# Patient Record
Sex: FEMALE | Race: WHITE | NOT HISPANIC OR LATINO | Employment: FULL TIME | URBAN - NONMETROPOLITAN AREA
[De-identification: names, ages, dates, MRNs, and addresses within clinical notes are randomized per-mention and may not be internally consistent; named-entity substitution may affect disease eponyms.]

---

## 2021-07-15 ENCOUNTER — APPOINTMENT (EMERGENCY)
Dept: ULTRASOUND IMAGING | Facility: HOSPITAL | Age: 56
End: 2021-07-15
Payer: COMMERCIAL

## 2021-07-15 ENCOUNTER — HOSPITAL ENCOUNTER (EMERGENCY)
Facility: HOSPITAL | Age: 56
Discharge: HOME | End: 2021-07-15
Attending: EMERGENCY MEDICINE | Admitting: EMERGENCY MEDICINE
Payer: COMMERCIAL

## 2021-07-15 VITALS
RESPIRATION RATE: 20 BRPM | HEART RATE: 101 BPM | OXYGEN SATURATION: 98 % | WEIGHT: 211.86 LBS | TEMPERATURE: 97.9 F | DIASTOLIC BLOOD PRESSURE: 83 MMHG | SYSTOLIC BLOOD PRESSURE: 149 MMHG

## 2021-07-15 DIAGNOSIS — R73.9 HYPERGLYCEMIA: ICD-10-CM

## 2021-07-15 DIAGNOSIS — R10.9 ABDOMINAL PAIN: Primary | ICD-10-CM

## 2021-07-15 LAB
ALBUMIN SERPL BCP-MCNC: 4.7 G/DL (ref 3.5–5)
ALP SERPL-CCNC: 62 U/L (ref 46–116)
ALT SERPL W P-5'-P-CCNC: 40 U/L (ref 12–78)
ANION GAP SERPL CALCULATED.3IONS-SCNC: 11 MMOL/L (ref 4–13)
AST SERPL W P-5'-P-CCNC: 19 U/L (ref 5–45)
ATRIAL RATE: 100 BPM
BASOPHILS # BLD AUTO: 0.06 THOUSANDS/ΜL (ref 0–0.1)
BASOPHILS NFR BLD AUTO: 1 % (ref 0–1)
BILIRUB SERPL-MCNC: 0.62 MG/DL (ref 0.2–1)
BILIRUB UR QL STRIP: NEGATIVE
BUN SERPL-MCNC: 18 MG/DL (ref 5–25)
CALCIUM SERPL-MCNC: 9.9 MG/DL (ref 8.3–10.1)
CHLORIDE SERPL-SCNC: 102 MMOL/L (ref 100–108)
CLARITY UR: CLEAR
CO2 SERPL-SCNC: 24 MMOL/L (ref 21–32)
COLOR UR: YELLOW
CREAT SERPL-MCNC: 0.93 MG/DL (ref 0.6–1.3)
EOSINOPHIL # BLD AUTO: 0.04 THOUSAND/ΜL (ref 0–0.61)
EOSINOPHIL NFR BLD AUTO: 0 % (ref 0–6)
ERYTHROCYTE [DISTWIDTH] IN BLOOD BY AUTOMATED COUNT: 12.2 % (ref 11.6–15.1)
GFR SERPL CREATININE-BSD FRML MDRD: 69 ML/MIN/1.73SQ M
GLUCOSE SERPL-MCNC: 242 MG/DL (ref 65–140)
GLUCOSE UR STRIP-MCNC: ABNORMAL MG/DL
HCT VFR BLD AUTO: 47 % (ref 34.8–46.1)
HGB BLD-MCNC: 15.6 G/DL (ref 11.5–15.4)
HGB UR QL STRIP.AUTO: NEGATIVE
IMM GRANULOCYTES # BLD AUTO: 0.04 THOUSAND/UL (ref 0–0.2)
IMM GRANULOCYTES NFR BLD AUTO: 0 % (ref 0–2)
KETONES UR STRIP-MCNC: ABNORMAL MG/DL
LEUKOCYTE ESTERASE UR QL STRIP: NEGATIVE
LIPASE SERPL-CCNC: 158 U/L (ref 73–393)
LYMPHOCYTES # BLD AUTO: 2.6 THOUSANDS/ΜL (ref 0.6–4.47)
LYMPHOCYTES NFR BLD AUTO: 25 % (ref 14–44)
MCH RBC QN AUTO: 31.9 PG (ref 26.8–34.3)
MCHC RBC AUTO-ENTMCNC: 33.2 G/DL (ref 31.4–37.4)
MCV RBC AUTO: 96 FL (ref 82–98)
MONOCYTES # BLD AUTO: 0.97 THOUSAND/ΜL (ref 0.17–1.22)
MONOCYTES NFR BLD AUTO: 9 % (ref 4–12)
NEUTROPHILS # BLD AUTO: 6.92 THOUSANDS/ΜL (ref 1.85–7.62)
NEUTS SEG NFR BLD AUTO: 65 % (ref 43–75)
NITRITE UR QL STRIP: NEGATIVE
NRBC BLD AUTO-RTO: 0 /100 WBCS
P AXIS: 35 DEGREES
PH UR STRIP.AUTO: 5.5 [PH]
PLATELET # BLD AUTO: 350 THOUSANDS/UL (ref 149–390)
PMV BLD AUTO: 10.9 FL (ref 8.9–12.7)
POTASSIUM SERPL-SCNC: 3.8 MMOL/L (ref 3.5–5.3)
PR INTERVAL: 154 MS
PROT SERPL-MCNC: 8 G/DL (ref 6.4–8.2)
PROT UR STRIP-MCNC: NEGATIVE MG/DL
QRS AXIS: -7 DEGREES
QRSD INTERVAL: 82 MS
QT INTERVAL: 352 MS
QTC INTERVAL: 454 MS
RBC # BLD AUTO: 4.89 MILLION/UL (ref 3.81–5.12)
SODIUM SERPL-SCNC: 137 MMOL/L (ref 136–145)
SP GR UR STRIP.AUTO: 1.02 (ref 1–1.03)
T WAVE AXIS: 48 DEGREES
UROBILINOGEN UR QL STRIP.AUTO: 0.2 E.U./DL
VENTRICULAR RATE: 100 BPM
WBC # BLD AUTO: 10.63 THOUSAND/UL (ref 4.31–10.16)

## 2021-07-15 PROCEDURE — 76705 ECHO EXAM OF ABDOMEN: CPT

## 2021-07-15 PROCEDURE — 85025 COMPLETE CBC W/AUTO DIFF WBC: CPT | Performed by: EMERGENCY MEDICINE

## 2021-07-15 PROCEDURE — 99285 EMERGENCY DEPT VISIT HI MDM: CPT | Performed by: EMERGENCY MEDICINE

## 2021-07-15 PROCEDURE — 83690 ASSAY OF LIPASE: CPT | Performed by: EMERGENCY MEDICINE

## 2021-07-15 PROCEDURE — 81003 URINALYSIS AUTO W/O SCOPE: CPT | Performed by: EMERGENCY MEDICINE

## 2021-07-15 PROCEDURE — 36415 COLL VENOUS BLD VENIPUNCTURE: CPT

## 2021-07-15 PROCEDURE — 93010 ELECTROCARDIOGRAM REPORT: CPT | Performed by: INTERNAL MEDICINE

## 2021-07-15 PROCEDURE — 80053 COMPREHEN METABOLIC PANEL: CPT | Performed by: EMERGENCY MEDICINE

## 2021-07-15 PROCEDURE — 99285 EMERGENCY DEPT VISIT HI MDM: CPT

## 2021-07-15 PROCEDURE — 93005 ELECTROCARDIOGRAM TRACING: CPT

## 2021-07-15 PROCEDURE — 96360 HYDRATION IV INFUSION INIT: CPT

## 2021-07-15 RX ORDER — ATORVASTATIN CALCIUM 10 MG/1
10 TABLET, FILM COATED ORAL DAILY
COMMUNITY
End: 2022-03-18 | Stop reason: SDUPTHER

## 2021-07-15 RX ORDER — ASPIRIN 81 MG/1
81 TABLET ORAL DAILY
COMMUNITY
End: 2022-03-18 | Stop reason: SDUPTHER

## 2021-07-15 RX ORDER — DICYCLOMINE HCL 20 MG
20 TABLET ORAL 2 TIMES DAILY
Qty: 20 TABLET | Refills: 0 | Status: SHIPPED | OUTPATIENT
Start: 2021-07-15

## 2021-07-15 RX ORDER — AMLODIPINE BESYLATE 5 MG/1
5 TABLET ORAL DAILY
COMMUNITY
End: 2022-03-18 | Stop reason: SDUPTHER

## 2021-07-15 RX ORDER — UREA 10 %
400 LOTION (ML) TOPICAL DAILY
COMMUNITY
End: 2022-03-18 | Stop reason: SDUPTHER

## 2021-07-15 RX ORDER — LISINOPRIL 40 MG/1
40 TABLET ORAL DAILY
COMMUNITY
End: 2022-03-18 | Stop reason: SDUPTHER

## 2021-07-15 RX ADMIN — SODIUM CHLORIDE 1000 ML: 0.9 INJECTION, SOLUTION INTRAVENOUS at 09:06

## 2021-07-15 NOTE — ED PROVIDER NOTES
History  Chief Complaint   Patient presents with    Abdominal Pain     right sided abdominal pain for 10 days  states "has a fatty liver"     Patient is a pleasant 58-year-old female complaining of generalized abdominal pain mostly on the right side her abdomen is in for the right upper quadrant to the right lower quadrant  Mild nausea but no vomiting  Occasional episodes of diarrhea  Occasional radiation to the back  Patient states he has recently changed her antihyperglycemic medications, but otherwise no change in diet  Patient otherwise very active and energetic  Denies any fevers chills  Denies any chest pain shortness of breath, headaches, rash, edema calf pain tenderness or asymmetry  No recent travel  No other sick contacts  Patient is not immunocompromised immunosuppressed  Patient not anticoagulated  Patient has been vaccinated for COVID x2  Prior to Admission Medications   Prescriptions Last Dose Informant Patient Reported? Taking? amLODIPine (NORVASC) 5 mg tablet   Yes Yes   Sig: Take 5 mg by mouth daily   aspirin (ECOTRIN LOW STRENGTH) 81 mg EC tablet   Yes No   Sig: Take 81 mg by mouth daily   atorvastatin (LIPITOR) 10 mg tablet   Yes Yes   Sig: Take 10 mg by mouth daily   folic acid (FOLVITE) 933 MCG tablet   Yes No   Sig: Take 400 mcg by mouth daily   lisinopril (ZESTRIL) 40 mg tablet   Yes Yes   Sig: Take 40 mg by mouth daily   metFORMIN (GLUCOPHAGE) 1000 MG tablet   Yes Yes   Sig: Take 1,000 mg by mouth 2 (two) times a day with meals   sitaGLIPtin (JANUVIA) 100 mg tablet   Yes No   Sig: Take 100 mg by mouth daily      Facility-Administered Medications: None       Past Medical History:   Diagnosis Date    Diabetes mellitus ([address] )     High cholesterol     Hypertension        Past Surgical History:   Procedure Laterality Date    HYSTERECTOMY         History reviewed  No pertinent family history  I have reviewed and agree with the history as documented      E-Cigarette/Vaping  E-Cigarette Use Never User      E-Cigarette/Vaping Substances     Social History     Tobacco Use    Smoking status: Former Smoker    Smokeless tobacco: Never Used   Vaping Use    Vaping Use: Never used   Substance Use Topics    Alcohol use: Never    Drug use: Never       Review of Systems   Constitutional: Negative for appetite change, chills, fatigue, fever and unexpected weight change  HENT: Negative for congestion, ear pain, rhinorrhea and sore throat  Eyes: Negative for pain and visual disturbance  Respiratory: Negative for cough, chest tightness, shortness of breath and wheezing  Cardiovascular: Negative for chest pain, palpitations and leg swelling  Gastrointestinal: Positive for abdominal distention, abdominal pain, diarrhea and nausea  Negative for constipation and vomiting  Genitourinary: Negative for difficulty urinating, dysuria, frequency, hematuria, menstrual problem, pelvic pain, vaginal bleeding and vaginal discharge  Musculoskeletal: Negative for arthralgias, back pain and neck pain  Skin: Negative for color change and rash  Neurological: Negative for dizziness, seizures, syncope, light-headedness and headaches  Psychiatric/Behavioral: Negative for confusion and sleep disturbance  All other systems reviewed and are negative  Physical Exam  Physical Exam  Vitals and nursing note reviewed  Constitutional:       General: She is not in acute distress  Appearance: She is well-developed and normal weight  She is not ill-appearing, toxic-appearing or diaphoretic  HENT:      Head: Normocephalic and atraumatic  Mouth/Throat:      Mouth: Mucous membranes are moist    Eyes:      General: No scleral icterus  Extraocular Movements: Extraocular movements intact  Conjunctiva/sclera: Conjunctivae normal    Cardiovascular:      Rate and Rhythm: Normal rate and regular rhythm  Heart sounds: Normal heart sounds  No murmur heard  No friction rub   No gallop  Pulmonary:      Effort: Pulmonary effort is normal  No respiratory distress  Breath sounds: Normal breath sounds  No wheezing or rales  Abdominal:      General: Bowel sounds are normal  There is no distension  There are no signs of injury  Palpations: Abdomen is soft  There is no hepatomegaly, splenomegaly or mass  Tenderness: There is abdominal tenderness in the right upper quadrant and epigastric area  There is no guarding or rebound  Negative signs include Paredes's sign and McBurney's sign  Hernia: No hernia is present  Musculoskeletal:         General: Normal range of motion  Cervical back: Normal range of motion and neck supple  Skin:     General: Skin is warm and dry  Capillary Refill: Capillary refill takes less than 2 seconds  Coloration: Skin is not cyanotic, mottled or pale  Findings: No rash  Neurological:      General: No focal deficit present  Mental Status: She is alert and oriented to person, place, and time     Psychiatric:         Mood and Affect: Mood normal          Behavior: Behavior normal          Vital Signs  ED Triage Vitals   Temperature Pulse Respirations Blood Pressure SpO2   07/15/21 0821 07/15/21 0821 07/15/21 0821 07/15/21 0821 07/15/21 0821   97 9 °F (36 6 °C) (!) 120 18 (!) 186/119 98 %      Temp Source Heart Rate Source Patient Position - Orthostatic VS BP Location FiO2 (%)   07/15/21 0821 07/15/21 0830 07/15/21 0821 07/15/21 0821 --   Temporal Monitor Sitting Left arm       Pain Score       07/15/21 0821       5           Vitals:    07/15/21 0900 07/15/21 0930 07/15/21 1000 07/15/21 1010   BP: 147/82 150/77 153/74 149/83   Pulse: 103 99 101 101   Patient Position - Orthostatic VS: Sitting Sitting Sitting          Visual Acuity      ED Medications  Medications   sodium chloride 0 9 % bolus 1,000 mL (0 mL Intravenous Stopped 7/15/21 1006)       Diagnostic Studies  Results Reviewed     Procedure Component Value Units Date/Time    UA w Reflex to Microscopic w Reflex to Culture [[id number]]  (Abnormal) Collected: 07/15/21 0905    Lab Status: Final result Specimen: Urine, Clean Catch Updated: 07/15/21 0912     Color, UA Yellow     Clarity, UA Clear     Specific Gravity, UA 1 025     pH, UA 5 5     Leukocytes, UA Negative     Nitrite, UA Negative     Protein, UA Negative mg/dl      Glucose,  (1/4%) mg/dl      Ketones, UA 15 (1+) mg/dl      Urobilinogen, UA 0 2 E U /dl      Bilirubin, UA Negative     Blood, UA Negative    Comprehensive metabolic panel [[id number]]  (Abnormal) Collected: 07/15/21 0836    Lab Status: Final result Specimen: Blood from Arm, Right Updated: 07/15/21 0902     Sodium 137 mmol/L      Potassium 3 8 mmol/L      Chloride 102 mmol/L      CO2 24 mmol/L      ANION GAP 11 mmol/L      BUN 18 mg/dL      Creatinine 0 93 mg/dL      Glucose 242 mg/dL      Calcium 9 9 mg/dL      AST 19 U/L      ALT 40 U/L      Alkaline Phosphatase 62 U/L      Total Protein 8 0 g/dL      Albumin 4 7 g/dL      Total Bilirubin 0 62 mg/dL      eGFR 69 ml/min/1 73sq m     Narrative:      [place] guidelines for Chronic Kidney Disease (CKD):     Stage 1 with normal or high GFR (GFR > 90 mL/min/1 73 square meters)    Stage 2 Mild CKD (GFR = 60-89 mL/min/1 73 square meters)    Stage 3A Moderate CKD (GFR = 45-59 mL/min/1 73 square meters)    Stage 3B Moderate CKD (GFR = 30-44 mL/min/1 73 square meters)    Stage 4 Severe CKD (GFR = 15-29 mL/min/1 73 square meters)    Stage 5 End Stage CKD (GFR <15 mL/min/1 73 square meters)  Note: GFR calculation is accurate only with a steady state creatinine    Lipase [[id number]]  (Normal) Collected: 07/15/21 0836    Lab Status: Final result Specimen: Blood from Arm, Right Updated: 07/15/21 0856     Lipase 158 u/L     CBC and differential [[id number]]  (Abnormal) Collected: 07/15/21 0836    Lab Status: Final result Specimen: Blood from Arm, Right Updated: 07/15/21 0843     WBC 10 63 Thousand/uL      RBC 4 89 Million/uL      Hemoglobin 15 6 g/dL      Hematocrit 47 0 %      MCV 96 fL      MCH 31 9 pg      MCHC 33 2 g/dL      RDW 12 2 %      MPV 10 9 fL      Platelets 021 Thousands/uL      nRBC 0 /100 WBCs      Neutrophils Relative 65 %      Immat GRANS % 0 %      Lymphocytes Relative 25 %      Monocytes Relative 9 %      Eosinophils Relative 0 %      Basophils Relative 1 %      Neutrophils Absolute 6 92 Thousands/µL      Immature Grans Absolute 0 04 Thousand/uL      Lymphocytes Absolute 2 60 Thousands/µL      Monocytes Absolute 0 97 Thousand/µL      Eosinophils Absolute 0 04 Thousand/µL      Basophils Absolute 0 06 Thousands/µL                  US gallbladder   Final Result by Renato Rhodes MD (07/15 1037)      Fatty infiltration of the liver  No evidence for cholelithiasis  Workstation performed: [id number]                    Procedures  ECG 12 Lead Documentation Only    Date/Time: 7/15/2021 11:11 AM  Performed by: Isabella Posey DO  Authorized by: Isabella Posey DO     Indications / Diagnosis:  Abd pain  ECG reviewed by me, the ED Provider: yes    Patient location:  ED  Rate:     ECG rate:  98    ECG rate assessment: normal    Rhythm:     Rhythm: sinus rhythm    Ectopy:     Ectopy: PVCs    QRS:     QRS axis:  Normal    QRS intervals:  Normal  Conduction:     Conduction: normal    ST segments:     ST segments:  Normal  T waves:     T waves: normal               ED Course                             SBIRT 20yo+      Most Recent Value   SBIRT (22 yo +)   In order to provide better care to our patients, we are screening all of our patients for alcohol and drug use  Would it be okay to ask you these screening questions? Yes Filed at: 07/15/2021 0846   Initial Alcohol Screen: US AUDIT-C    1  How often do you have a drink containing alcohol?  0 Filed at: 07/15/2021 0846   2  How many drinks containing alcohol do you have on a typical day you are drinking?    0 Filed at: 07/15/2021 0846   3b  FEMALE Any Age, or MALE 65+: How often do you have 4 or more drinks on one occassion? 0 Filed at: 07/15/2021 0846   Audit-C Score  0 Filed at: 07/15/2021 8726   HUMAIRA: How many times in the past year have you    Used an illegal drug or used a prescription medication for non-medical reasons? Never Filed at: 07/15/2021 0846                    MDM    Disposition  Final diagnoses:   Abdominal pain   Hyperglycemia     Time reflects when diagnosis was documented in both MDM as applicable and the Disposition within this note     Time User Action Codes Description Comment    7/15/2021 11:05 AM Cameron MARTELL Add [R10 9] Abdominal pain     7/15/2021 11:05 AM Cameron MARTELL Add [R73 9] Hyperglycemia       ED Disposition     ED Disposition Condition Date/Time Comment    Discharge Stable Thu Jul 15, 2021 11:05 AM Sammie Torres discharge to home/self care              Follow-up Information     Follow up With Specialties Details Why Contact Info    Derek Hong MD Family Medicine Schedule an appointment as soon as possible for a visit   [address] [place] [address] Court  [phone]            Discharge Medication List as of 7/15/2021 11:06 AM      START taking these medications    Details   dicyclomine (BENTYL) 20 mg tablet Take 1 tablet (20 mg total) by mouth 2 (two) times a day, Starting Thu 7/15/2021, Normal         CONTINUE these medications which have NOT CHANGED    Details   amLODIPine (NORVASC) 5 mg tablet Take 5 mg by mouth daily, Historical Med      atorvastatin (LIPITOR) 10 mg tablet Take 10 mg by mouth daily, Historical Med      lisinopril (ZESTRIL) 40 mg tablet Take 40 mg by mouth daily, Historical Med      metFORMIN (GLUCOPHAGE) 1000 MG tablet Take 1,000 mg by mouth 2 (two) times a day with meals, Historical Med      aspirin (ECOTRIN LOW STRENGTH) 81 mg EC tablet Take 81 mg by mouth daily, Historical Med      folic acid (FOLVITE) 121 MCG tablet Take 400 mcg by mouth daily, Historical Med      sitaGLIPtin (JANUVIA) 100 mg tablet Take 100 mg by mouth daily, Historical Med           No discharge procedures on file      PDMP Review     None          ED Provider  Electronically Signed by           Gal Monge, DO  07/15/21 George [id number], DO  07/15/21 2524

## 2022-02-10 ENCOUNTER — APPOINTMENT (EMERGENCY)
Dept: CT IMAGING | Facility: HOSPITAL | Age: 57
End: 2022-02-10
Payer: COMMERCIAL

## 2022-02-10 ENCOUNTER — HOSPITAL ENCOUNTER (EMERGENCY)
Facility: HOSPITAL | Age: 57
Discharge: HOME | End: 2022-02-10
Attending: EMERGENCY MEDICINE
Payer: COMMERCIAL

## 2022-02-10 VITALS
SYSTOLIC BLOOD PRESSURE: 136 MMHG | HEIGHT: 71 IN | OXYGEN SATURATION: 98 % | WEIGHT: 211 LBS | BODY MASS INDEX: 29.54 KG/M2 | TEMPERATURE: 99.4 F | RESPIRATION RATE: 13 BRPM | DIASTOLIC BLOOD PRESSURE: 81 MMHG | HEART RATE: 114 BPM

## 2022-02-10 DIAGNOSIS — R10.84 GENERALIZED ABDOMINAL PAIN: Primary | ICD-10-CM

## 2022-02-10 DIAGNOSIS — K21.9 GERD (GASTROESOPHAGEAL REFLUX DISEASE): ICD-10-CM

## 2022-02-10 LAB
ALBUMIN SERPL BCP-MCNC: 4.6 G/DL (ref 3.5–5)
ALP SERPL-CCNC: 74 U/L (ref 46–116)
ALT SERPL W P-5'-P-CCNC: 38 U/L (ref 12–78)
ANION GAP SERPL CALCULATED.3IONS-SCNC: 14 MMOL/L (ref 4–13)
AST SERPL W P-5'-P-CCNC: 19 U/L (ref 5–45)
BASOPHILS # BLD AUTO: 0.05 THOUSANDS/ΜL (ref 0–0.1)
BASOPHILS NFR BLD AUTO: 0 % (ref 0–1)
BILIRUB SERPL-MCNC: 0.45 MG/DL (ref 0.2–1)
BUN SERPL-MCNC: 16 MG/DL (ref 5–25)
CALCIUM SERPL-MCNC: 9.2 MG/DL (ref 8.3–10.1)
CHLORIDE SERPL-SCNC: 105 MMOL/L (ref 100–108)
CO2 SERPL-SCNC: 23 MMOL/L (ref 21–32)
CREAT SERPL-MCNC: 0.86 MG/DL (ref 0.6–1.3)
EOSINOPHIL # BLD AUTO: 0.02 THOUSAND/ΜL (ref 0–0.61)
EOSINOPHIL NFR BLD AUTO: 0 % (ref 0–6)
ERYTHROCYTE [DISTWIDTH] IN BLOOD BY AUTOMATED COUNT: 12.7 % (ref 11.6–15.1)
GFR SERPL CREATININE-BSD FRML MDRD: 75 ML/MIN/1.73SQ M
GLUCOSE SERPL-MCNC: 220 MG/DL (ref 65–140)
HCT VFR BLD AUTO: 48.8 % (ref 34.8–46.1)
HGB BLD-MCNC: 16 G/DL (ref 11.5–15.4)
IMM GRANULOCYTES # BLD AUTO: 0.02 THOUSAND/UL (ref 0–0.2)
IMM GRANULOCYTES NFR BLD AUTO: 0 % (ref 0–2)
LYMPHOCYTES # BLD AUTO: 2.77 THOUSANDS/ΜL (ref 0.6–4.47)
LYMPHOCYTES NFR BLD AUTO: 23 % (ref 14–44)
MCH RBC QN AUTO: 31.6 PG (ref 26.8–34.3)
MCHC RBC AUTO-ENTMCNC: 32.8 G/DL (ref 31.4–37.4)
MCV RBC AUTO: 96 FL (ref 82–98)
MONOCYTES # BLD AUTO: 1.03 THOUSAND/ΜL (ref 0.17–1.22)
MONOCYTES NFR BLD AUTO: 9 % (ref 4–12)
NEUTROPHILS # BLD AUTO: 8.07 THOUSANDS/ΜL (ref 1.85–7.62)
NEUTS SEG NFR BLD AUTO: 68 % (ref 43–75)
NRBC BLD AUTO-RTO: 0 /100 WBCS
PLATELET # BLD AUTO: 372 THOUSANDS/UL (ref 149–390)
PMV BLD AUTO: 10.5 FL (ref 8.9–12.7)
POTASSIUM SERPL-SCNC: 3.7 MMOL/L (ref 3.5–5.3)
PROT SERPL-MCNC: 7.9 G/DL (ref 6.4–8.2)
RBC # BLD AUTO: 5.07 MILLION/UL (ref 3.81–5.12)
SODIUM SERPL-SCNC: 142 MMOL/L (ref 136–145)
WBC # BLD AUTO: 11.96 THOUSAND/UL (ref 4.31–10.16)

## 2022-02-10 PROCEDURE — 93005 ELECTROCARDIOGRAM TRACING: CPT

## 2022-02-10 PROCEDURE — 99284 EMERGENCY DEPT VISIT MOD MDM: CPT

## 2022-02-10 PROCEDURE — 85025 COMPLETE CBC W/AUTO DIFF WBC: CPT | Performed by: STUDENT IN AN ORGANIZED HEALTH CARE EDUCATION/TRAINING PROGRAM

## 2022-02-10 PROCEDURE — G1004 CDSM NDSC: HCPCS

## 2022-02-10 PROCEDURE — 80053 COMPREHEN METABOLIC PANEL: CPT | Performed by: STUDENT IN AN ORGANIZED HEALTH CARE EDUCATION/TRAINING PROGRAM

## 2022-02-10 PROCEDURE — 99285 EMERGENCY DEPT VISIT HI MDM: CPT | Performed by: EMERGENCY MEDICINE

## 2022-02-10 PROCEDURE — 36415 COLL VENOUS BLD VENIPUNCTURE: CPT | Performed by: STUDENT IN AN ORGANIZED HEALTH CARE EDUCATION/TRAINING PROGRAM

## 2022-02-10 PROCEDURE — 74177 CT ABD & PELVIS W/CONTRAST: CPT

## 2022-02-10 RX ADMIN — IOHEXOL 100 ML: 350 INJECTION, SOLUTION INTRAVENOUS at 12:33

## 2022-02-10 NOTE — ED ATTENDING ATTESTATION
2/10/2022  I saw and evaluated the patient  I have discussed the patient with the resident physician and agree with the resident's findings, assessment and plan as documented in the resident physician's note, unless otherwise documented below  All available laboratory and imaging studies were reviewed by myself  I was present for key portions of any procedure(s) performed by the resident and I was immediately available to provide assistance  I agree with the current assessment done in the Emergency Department  I have conducted an independent evaluation of this patient  Chief Complaint   Patient presents with    Abdominal Pain     patient reports two weeks of intermittent right sided abdominal pain and diarrhea that began this morning  Josephine La is a 61 y o  female with past medical history of hypertension, hyperlipidemia, and diabetes mellitus presenting with abdominal pain and diarrhea  Patient reports that over the past several years, she has had right-sided abdominal pains that come and go, associated with excessive burping and gas  There does not appear to be any obvious provocation except perhaps with certain meals  There is occasional diarrhea  This morning, patient reports drinking just a cup of coffee  She has then been busy cleaning  She then developed a right-sided cramping abdominal pain and proceeded to have a bowel movement that was primarily loose/diarrheal in nature  There was no blood in stool, there was no dark tarry stool  She also had acid reflux and increased gas  No recent fevers or chills  No chest pain  No shortness of breath  No nausea or vomiting today  Patient is wondering whether she has an infection or perhaps gastritis  She also reports that her symptoms started sometime after her primary care physician started her on Jardiance    She is presenting today because the pain made her anxious and she wants to make sure that she does not have an infection  REVIEW OF SYSTEMS    Constitutional: denies fevers, chills  Visual/Eyes: no changes in vision  HENT: no rhinorrhea, no sore throat  Cardiac: no chest pain  Respiratory: no shortness of breath, no cough  GI:  As above  : no burning with urination  Heme/Onc: no easy bruising  Endocrine:  History of diabetes  Neuro: no focal weakness or numbness, no headaches    Ten systems reviewed and negative unless otherwise noted in HPI and above    PAST MEDICAL HISTORY  Past Medical History:   Diagnosis Date    Diabetes mellitus ([facility] [address] )     High cholesterol     Hypertension        SURGICAL HISTORY  Past Surgical History:   Procedure Laterality Date    HYSTERECTOMY         FAMILY HISTORY  History reviewed  No pertinent family history  CURRENT MEDICATIONS  No current facility-administered medications on file prior to encounter       Current Outpatient Medications on File Prior to Encounter   Medication Sig    amLODIPine (NORVASC) 5 mg tablet Take 5 mg by mouth daily    aspirin (ECOTRIN LOW STRENGTH) 81 mg EC tablet Take 81 mg by mouth daily    atorvastatin (LIPITOR) 10 mg tablet Take 10 mg by mouth daily    dicyclomine (BENTYL) 20 mg tablet Take 1 tablet (20 mg total) by mouth 2 (two) times a day    folic acid (FOLVITE) 698 MCG tablet Take 400 mcg by mouth daily    lisinopril (ZESTRIL) 40 mg tablet Take 40 mg by mouth daily    metFORMIN (GLUCOPHAGE) 1000 MG tablet Take 1,000 mg by mouth 2 (two) times a day with meals    sitaGLIPtin (JANUVIA) 100 mg tablet Take 100 mg by mouth daily       ALLERGIES  No Known Allergies    SOCIAL HISTORY  Social History     Socioeconomic History    Marital status: /Civil Union     Spouse name: None    Number of children: None    Years of education: None    Highest education level: None   Occupational History    None   Tobacco Use    Smoking status: Former Smoker    Smokeless tobacco: Never Used   Vaping Use    Vaping Use: Never used   Substance and Sexual Activity    Alcohol use: Never    Drug use: Never    Sexual activity: None   Other Topics Concern    None   Social History Narrative    None     Social Determinants of Health     Financial Resource Strain: Not on file   Food Insecurity: Not on file   Transportation Needs: Not on file   Physical Activity: Not on file   Stress: Not on file   Social Connections: Not on file   Intimate Partner Violence: Not on file   Housing Stability: Not on file       PHYSICAL EXAM  /96 (BP Location: Right arm)   Pulse (!) 115   Resp 16   Ht 5' 11" (1 803 m)   Wt 95 7 kg (211 lb)   SpO2 99%   BMI 29 43 kg/m²   Vital signs and nursing notes reviewed    CONSTITUTIONAL: female appearing stated age resting in bed, in no acute distress  HEENT: atraumatic, normocephalic  Sclera anicteric, conjunctiva are not injected  Moist oral mucosa  CARDIOVASCULAR/CHEST: RRR, no M/R/G  2+ radial pulses  PULMONARY: Breathing comfortably on RA  Breath sounds are equal and clear to auscultation  ABDOMEN: non-distended  BS present, mildly hyperactive, not high-pitched  Mild tenderness with palpation along lower abdomen as well as right periumbilical region  Negative Paredes's  No tenderness over McBurney's point  MSK: moves all extremities, no deformities, no peripheral edema, no calf asymmetry  NEURO: Awake, alert, and oriented x 3  Face symmetric  Moves all extremities spontaneously   No focal neurologic deficits  SKIN: Warm, appears well-perfused  MENTAL STATUS: Normal affect        DIAGNOSTIC STUDIES  Results Reviewed     Procedure Component Value Units Date/Time    Comprehensive metabolic panel [[id number]]  (Abnormal) Collected: 02/10/22 1145    Lab Status: Final result Specimen: Blood from Arm, Left Updated: 02/10/22 1221     Sodium 142 mmol/L      Potassium 3 7 mmol/L      Chloride 105 mmol/L      CO2 23 mmol/L      ANION GAP 14 mmol/L      BUN 16 mg/dL      Creatinine 0 86 mg/dL      Glucose 220 mg/dL      Calcium 9 2 mg/dL AST 19 U/L      ALT 38 U/L      Alkaline Phosphatase 74 U/L      Total Protein 7 9 g/dL      Albumin 4 6 g/dL      Total Bilirubin 0 45 mg/dL      eGFR 75 ml/min/1 73sq m     Narrative:      [place] guidelines for Chronic Kidney Disease (CKD):     Stage 1 with normal or high GFR (GFR > 90 mL/min/1 73 square meters)    Stage 2 Mild CKD (GFR = 60-89 mL/min/1 73 square meters)    Stage 3A Moderate CKD (GFR = 45-59 mL/min/1 73 square meters)    Stage 3B Moderate CKD (GFR = 30-44 mL/min/1 73 square meters)    Stage 4 Severe CKD (GFR = 15-29 mL/min/1 73 square meters)    Stage 5 End Stage CKD (GFR <15 mL/min/1 73 square meters)  Note: GFR calculation is accurate only with a steady state creatinine    CBC and differential [[id number]]  (Abnormal) Collected: 02/10/22 1145    Lab Status: Final result Specimen: Blood from Arm, Left Updated: 02/10/22 1150     WBC 11 96 Thousand/uL      RBC 5 07 Million/uL      Hemoglobin 16 0 g/dL      Hematocrit 48 8 %      MCV 96 fL      MCH 31 6 pg      MCHC 32 8 g/dL      RDW 12 7 %      MPV 10 5 fL      Platelets 580 Thousands/uL      nRBC 0 /100 WBCs      Neutrophils Relative 68 %      Immat GRANS % 0 %      Lymphocytes Relative 23 %      Monocytes Relative 9 %      Eosinophils Relative 0 %      Basophils Relative 0 %      Neutrophils Absolute 8 07 Thousands/µL      Immature Grans Absolute 0 02 Thousand/uL      Lymphocytes Absolute 2 77 Thousands/µL      Monocytes Absolute 1 03 Thousand/µL      Eosinophils Absolute 0 02 Thousand/µL      Basophils Absolute 0 05 Thousands/µL           CT abdomen pelvis with contrast   Final Result      Colonic diverticulosis              Workstation performed: [id number]             PROCEDURES  ECG 12 Lead Documentation Only    Date/Time: 2/10/2022 12:10 PM  Performed by: Mary Rdz MD  Authorized by: Mary Rdz MD     Comments:      Sinus tachycardia, ventricular rate 115, GA interval 154, QRS 78, , normal axis, no ST/T-wave changes to suggest ischemia, no STEMI  ED COURSE  Medications   iohexol (OMNIPAQUE) 350 MG/ML injection (SINGLE-DOSE) 100 mL (100 mL Intravenous Given 2/10/22 153)     41-year-old female presenting with intermittent right-sided abdominal cramping pains as well as increased eructation and bloating which recurred today and was associated with diarrhea  Vital signs reviewed, afebrile, tachycardic, within normal limits otherwise  Differential diagnosis includes biliary colic, cholecystitis, pancreatitis, colitis, diverticulitis, irritable bowel syndrome, inflammatory bowel disease, gastritis, GERD, versus another etiology of symptoms  Today, patient's abdominal exam is nonsurgical   Her increase in bloating does raise suspicion for either dyspepsia or perhaps food sensitivity or medication side effect  Patient also reports acid reflux, likely has GERD  Labs today reveal mild leukocytosis of 11 96, not significantly changed from prior, CMP is with mild anion gap of 14, unremarkable otherwise  Patient reports only having a cup of coffee today, reports that she does feel dehydrated  I wonder if this explains her mild anion gap  There is nothing to suggest euglycemic diabetic ketoacidosis at this time  Record review reveals that patient has previously been seen for abdominal pains, however, I do not see any cross-sectional imaging in our system  Therefore, we will obtain CT of the abdomen and pelvis with IV contrast to rule out any unexpected etiologies of patient's persistent symptoms  CT obtained, revealing colonic diverticulosis, no acute intra-abdominal pathology  At this time, patient would benefit from follow-up with GI, possibly further testing such as for gluten sensitivity, lactose intolerance, H pylori, etcetera  Will start patient on a PPI for GERD  Patient discharged to home with recommendations for symptom control, return precautions, and plan for follow up  CLINICAL IMPRESSION  Final diagnoses:   Generalized abdominal pain   GERD (gastroesophageal reflux disease)

## 2022-02-10 NOTE — ED PROVIDER NOTES
History  Chief Complaint   Patient presents with    Abdominal Pain     patient reports two weeks of intermittent right sided abdominal pain and diarrhea that began this morning  HPI:  This is a 50-year-old female who presents the emergency department today with a chief complaint of right upper abdominal pain  Patient states she has been having this pain for several years and thinks that is related to her fatty liver/excessive gas  She is unable to provide me a reason why she came to the emergency department today  However she denies any changes in the chronicity or severity of her pain  She denies any fevers or chills, denies any shortness of breath nausea vomiting  However she does note 1 episode of diarrhea this morning  Prior to Admission Medications   Prescriptions Last Dose Informant Patient Reported? Taking? amLODIPine (NORVASC) 5 mg tablet   Yes No   Sig: Take 5 mg by mouth daily   aspirin (ECOTRIN LOW STRENGTH) 81 mg EC tablet   Yes No   Sig: Take 81 mg by mouth daily   atorvastatin (LIPITOR) 10 mg tablet   Yes No   Sig: Take 10 mg by mouth daily   dicyclomine (BENTYL) 20 mg tablet   No No   Sig: Take 1 tablet (20 mg total) by mouth 2 (two) times a day   folic acid (FOLVITE) 434 MCG tablet   Yes No   Sig: Take 400 mcg by mouth daily   lisinopril (ZESTRIL) 40 mg tablet   Yes No   Sig: Take 40 mg by mouth daily   metFORMIN (GLUCOPHAGE) 1000 MG tablet   Yes No   Sig: Take 1,000 mg by mouth 2 (two) times a day with meals   sitaGLIPtin (JANUVIA) 100 mg tablet   Yes No   Sig: Take 100 mg by mouth daily      Facility-Administered Medications: None       Past Medical History:   Diagnosis Date    Diabetes mellitus ([address] )     High cholesterol     Hypertension        Past Surgical History:   Procedure Laterality Date    HYSTERECTOMY         History reviewed  No pertinent family history  I have reviewed and agree with the history as documented      E-Cigarette/Vaping    E-Cigarette Use Never User E-Cigarette/Vaping Substances     Social History     Tobacco Use    Smoking status: Former Smoker    Smokeless tobacco: Never Used   Vaping Use    Vaping Use: Never used   Substance Use Topics    Alcohol use: Never    Drug use: Never        Review of Systems   Constitutional: Negative for activity change, appetite change, chills, fatigue and fever  HENT: Negative for congestion, dental problem, drooling, ear discharge, ear pain, facial swelling, postnasal drip, rhinorrhea and sinus pain  Eyes: Negative for photophobia, pain, discharge and itching  Respiratory: Negative for apnea, cough, chest tightness and shortness of breath  Cardiovascular: Negative for chest pain and leg swelling  Gastrointestinal: Positive for abdominal pain  Negative for abdominal distention, anal bleeding, constipation, diarrhea and nausea  Endocrine: Negative for cold intolerance, heat intolerance and polydipsia  Genitourinary: Negative for difficulty urinating  Musculoskeletal: Negative for arthralgias, gait problem, joint swelling and myalgias  Skin: Negative for color change and pallor  Allergic/Immunologic: Negative for immunocompromised state  Neurological: Negative for dizziness, seizures, facial asymmetry, weakness, light-headedness, numbness and headaches  Psychiatric/Behavioral: Negative for agitation, behavioral problems, confusion, decreased concentration and dysphoric mood  All other systems reviewed and are negative        Physical Exam  ED Triage Vitals [02/10/22 1131]   Temperature Pulse Respirations Blood Pressure SpO2   99 4 °F (37 4 °C) (!) 115 16 170/96 99 %      Temp Source Heart Rate Source Patient Position - Orthostatic VS BP Location FiO2 (%)   Tympanic Monitor Sitting Right arm --      Pain Score       No Pain             Orthostatic Vital Signs  Vitals:    02/10/22 1131 02/10/22 1200   BP: 170/96 136/81   Pulse: (!) 115 (!) 114   Patient Position - Orthostatic VS: Sitting Lying Physical Exam  Vitals and nursing note reviewed  Constitutional:       General: She is not in acute distress  Appearance: She is well-developed  HENT:      Head: Normocephalic and atraumatic  Eyes:      Conjunctiva/sclera: Conjunctivae normal       Pupils: Pupils are equal, round, and reactive to light  Cardiovascular:      Rate and Rhythm: Normal rate and regular rhythm  Heart sounds: Normal heart sounds  No murmur heard  No friction rub  Pulmonary:      Effort: Pulmonary effort is normal       Breath sounds: Normal breath sounds  Abdominal:      General: Bowel sounds are normal       Palpations: Abdomen is soft  Musculoskeletal:         General: Normal range of motion  Cervical back: Normal range of motion and neck supple  Skin:     General: Skin is warm  Capillary Refill: Capillary refill takes less than 2 seconds  Neurological:      Mental Status: She is alert and oriented to person, place, and time  Motor: No abnormal muscle tone  Coordination: Coordination normal    Psychiatric:         Behavior: Behavior normal          Thought Content:  Thought content normal          ED Medications  Medications   iohexol (OMNIPAQUE) 350 MG/ML injection (SINGLE-DOSE) 100 mL (100 mL Intravenous Given 2/10/22 1233)       Diagnostic Studies  Results Reviewed     Procedure Component Value Units Date/Time    Comprehensive metabolic panel [[id number]]  (Abnormal) Collected: 02/10/22 1145    Lab Status: Final result Specimen: Blood from Arm, Left Updated: 02/10/22 1221     Sodium 142 mmol/L      Potassium 3 7 mmol/L      Chloride 105 mmol/L      CO2 23 mmol/L      ANION GAP 14 mmol/L      BUN 16 mg/dL      Creatinine 0 86 mg/dL      Glucose 220 mg/dL      Calcium 9 2 mg/dL      AST 19 U/L      ALT 38 U/L      Alkaline Phosphatase 74 U/L      Total Protein 7 9 g/dL      Albumin 4 6 g/dL      Total Bilirubin 0 45 mg/dL      eGFR 75 ml/min/1 73sq m     Narrative:      National Kidney Disease Foundation guidelines for Chronic Kidney Disease (CKD):     Stage 1 with normal or high GFR (GFR > 90 mL/min/1 73 square meters)    Stage 2 Mild CKD (GFR = 60-89 mL/min/1 73 square meters)    Stage 3A Moderate CKD (GFR = 45-59 mL/min/1 73 square meters)    Stage 3B Moderate CKD (GFR = 30-44 mL/min/1 73 square meters)    Stage 4 Severe CKD (GFR = 15-29 mL/min/1 73 square meters)    Stage 5 End Stage CKD (GFR <15 mL/min/1 73 square meters)  Note: GFR calculation is accurate only with a steady state creatinine    CBC and differential [[id number]]  (Abnormal) Collected: 02/10/22 1145    Lab Status: Final result Specimen: Blood from Arm, Left Updated: 02/10/22 1150     WBC 11 96 Thousand/uL      RBC 5 07 Million/uL      Hemoglobin 16 0 g/dL      Hematocrit 48 8 %      MCV 96 fL      MCH 31 6 pg      MCHC 32 8 g/dL      RDW 12 7 %      MPV 10 5 fL      Platelets 422 Thousands/uL      nRBC 0 /100 WBCs      Neutrophils Relative 68 %      Immat GRANS % 0 %      Lymphocytes Relative 23 %      Monocytes Relative 9 %      Eosinophils Relative 0 %      Basophils Relative 0 %      Neutrophils Absolute 8 07 Thousands/µL      Immature Grans Absolute 0 02 Thousand/uL      Lymphocytes Absolute 2 77 Thousands/µL      Monocytes Absolute 1 03 Thousand/µL      Eosinophils Absolute 0 02 Thousand/µL      Basophils Absolute 0 05 Thousands/µL                  CT abdomen pelvis with contrast   Final Result by Tad Winters MD (02/10 1311)      Colonic diverticulosis  Workstation performed: [id number]               Procedures  Procedures      ED Course                             SBIRT 22yo+      Most Recent Value   SBIRT (23 yo +)    In order to provide better care to our patients, we are screening all of our patients for alcohol and drug use  Would it be okay to ask you these screening questions? Yes Filed at: 02/10/2022 1150   Initial Alcohol Screen: US AUDIT-C     1   How often do you have a drink containing alcohol? 0 Filed at: 02/10/2022 1150   2  How many drinks containing alcohol do you have on a typical day you are drinking? 0 Filed at: 02/10/2022 1150   3b  FEMALE Any Age, or MALE 65+: How often do you have 4 or more drinks on one occassion? 0 Filed at: 02/10/2022 1150   Audit-C Score 0 Filed at: 02/10/2022 1150   HUMAIRA: How many times in the past year have you    Used an illegal drug or used a prescription medication for non-medical reasons? Never Filed at: 02/10/2022 1150                MDM    Disposition  Final diagnoses:   Generalized abdominal pain     Time reflects when diagnosis was documented in both MDM as applicable and the Disposition within this note     Time User Action Codes Description Comment    2/10/2022  1:17 PM Osceola Heri Add [R10 84] Generalized abdominal pain       ED Disposition     ED Disposition Condition Date/Time Comment    Discharge Stable Thu Feb 10, 2022  1:18 PM Annmarie Prater discharge to home/self care  Follow-up Information     Follow up With Specialties Details Why [address] [facility], [address] [facility]   [address] [place] [address] [address]      Veronica Reyna PA-C Physician Assistant   [address] [facility] [address]  [address] ,  Internal Medicine   [address] [place] [address],  [address] [phone]            Patient's Medications   Discharge Prescriptions    No medications on file     No discharge procedures on file  PDMP Review     None           ED Provider  Attending physically available and evaluated Annmarie Prater I managed the patient along with the ED Attending      Electronically Signed by         Seth Garduno MD  02/10/22 3842

## 2022-02-11 LAB
ATRIAL RATE: 115 BPM
P AXIS: 38 DEGREES
PR INTERVAL: 154 MS
QRS AXIS: -13 DEGREES
QRSD INTERVAL: 78 MS
QT INTERVAL: 326 MS
QTC INTERVAL: 450 MS
T WAVE AXIS: 52 DEGREES
VENTRICULAR RATE: 115 BPM

## 2022-02-11 PROCEDURE — 93010 ELECTROCARDIOGRAM REPORT: CPT | Performed by: INTERNAL MEDICINE

## 2022-02-11 RX ORDER — PANTOPRAZOLE SODIUM 20 MG/1
20 TABLET, DELAYED RELEASE ORAL DAILY
Qty: 30 TABLET | Refills: 0 | Status: SHIPPED | OUTPATIENT
Start: 2022-02-11 | End: 2022-03-18 | Stop reason: SDUPTHER

## 2022-03-18 ENCOUNTER — TELEPHONE (OUTPATIENT)
Dept: ADMINISTRATIVE | Facility: OTHER | Age: 57
End: 2022-03-18

## 2022-03-18 ENCOUNTER — OFFICE VISIT (OUTPATIENT)
Dept: FAMILY MEDICINE CLINIC | Facility: CLINIC | Age: 57
End: 2022-03-18
Payer: COMMERCIAL

## 2022-03-18 VITALS
SYSTOLIC BLOOD PRESSURE: 138 MMHG | TEMPERATURE: 98.9 F | DIASTOLIC BLOOD PRESSURE: 96 MMHG | OXYGEN SATURATION: 98 % | WEIGHT: 214.2 LBS | BODY MASS INDEX: 29.99 KG/M2 | HEIGHT: 71 IN | HEART RATE: 117 BPM

## 2022-03-18 DIAGNOSIS — E78.2 MIXED HYPERLIPIDEMIA: ICD-10-CM

## 2022-03-18 DIAGNOSIS — J01.90 ACUTE NON-RECURRENT SINUSITIS, UNSPECIFIED LOCATION: ICD-10-CM

## 2022-03-18 DIAGNOSIS — K21.9 GERD (GASTROESOPHAGEAL REFLUX DISEASE): ICD-10-CM

## 2022-03-18 DIAGNOSIS — Z00.00 HEALTHCARE MAINTENANCE: ICD-10-CM

## 2022-03-18 DIAGNOSIS — E11.9 TYPE 2 DIABETES MELLITUS WITHOUT COMPLICATION, WITHOUT LONG-TERM CURRENT USE OF INSULIN (HCC): ICD-10-CM

## 2022-03-18 DIAGNOSIS — Z00.00 ANNUAL PHYSICAL EXAM: ICD-10-CM

## 2022-03-18 DIAGNOSIS — Z00.00 WELL ADULT EXAM: Primary | ICD-10-CM

## 2022-03-18 DIAGNOSIS — I10 HYPERTENSION, UNSPECIFIED TYPE: ICD-10-CM

## 2022-03-18 LAB
LEFT EYE DIABETIC RETINOPATHY: ABNORMAL
LEFT EYE IMAGE QUALITY: ABNORMAL
LEFT EYE MACULAR EDEMA: ABNORMAL
LEFT EYE OTHER RETINOPATHY: ABNORMAL
RIGHT EYE DIABETIC RETINOPATHY: ABNORMAL
RIGHT EYE IMAGE QUALITY: ABNORMAL
RIGHT EYE MACULAR EDEMA: ABNORMAL
RIGHT EYE OTHER RETINOPATHY: ABNORMAL
SEVERITY (EYE EXAM): ABNORMAL
SL AMB POCT HEMOGLOBIN AIC: 7.3 (ref ?–6.5)

## 2022-03-18 PROCEDURE — 3080F DIAST BP >= 90 MM HG: CPT | Performed by: PHYSICIAN ASSISTANT

## 2022-03-18 PROCEDURE — 3008F BODY MASS INDEX DOCD: CPT | Performed by: PHYSICIAN ASSISTANT

## 2022-03-18 PROCEDURE — 3075F SYST BP GE 130 - 139MM HG: CPT | Performed by: PHYSICIAN ASSISTANT

## 2022-03-18 PROCEDURE — 1036F TOBACCO NON-USER: CPT | Performed by: PHYSICIAN ASSISTANT

## 2022-03-18 PROCEDURE — 4010F ACE/ARB THERAPY RXD/TAKEN: CPT | Performed by: PHYSICIAN ASSISTANT

## 2022-03-18 PROCEDURE — 99386 PREV VISIT NEW AGE 40-64: CPT | Performed by: PHYSICIAN ASSISTANT

## 2022-03-18 PROCEDURE — 83036 HEMOGLOBIN GLYCOSYLATED A1C: CPT | Performed by: PHYSICIAN ASSISTANT

## 2022-03-18 PROCEDURE — 2024F 7 FLD RTA PHOTO EVC RTNOPTHY: CPT | Performed by: PHYSICIAN ASSISTANT

## 2022-03-18 PROCEDURE — 3725F SCREEN DEPRESSION PERFORMED: CPT | Performed by: PHYSICIAN ASSISTANT

## 2022-03-18 PROCEDURE — 3051F HG A1C>EQUAL 7.0%<8.0%: CPT | Performed by: PHYSICIAN ASSISTANT

## 2022-03-18 RX ORDER — ASPIRIN 81 MG/1
81 TABLET ORAL DAILY
Qty: 90 TABLET | Refills: 1 | Status: SHIPPED | OUTPATIENT
Start: 2022-03-18

## 2022-03-18 RX ORDER — ATORVASTATIN CALCIUM 10 MG/1
10 TABLET, FILM COATED ORAL DAILY
Qty: 90 TABLET | Refills: 1 | Status: SHIPPED | OUTPATIENT
Start: 2022-03-18 | End: 2022-07-28

## 2022-03-18 RX ORDER — LISINOPRIL 40 MG/1
40 TABLET ORAL DAILY
Qty: 90 TABLET | Refills: 1 | Status: SHIPPED | OUTPATIENT
Start: 2022-03-18 | End: 2022-07-28

## 2022-03-18 RX ORDER — AZITHROMYCIN 250 MG/1
TABLET, FILM COATED ORAL
Qty: 6 TABLET | Refills: 0 | Status: SHIPPED | OUTPATIENT
Start: 2022-03-18 | End: 2022-03-22

## 2022-03-18 RX ORDER — SEMAGLUTIDE 1.34 MG/ML
0.5 INJECTION, SOLUTION SUBCUTANEOUS WEEKLY
Qty: 6 ML | Refills: 1 | Status: SHIPPED | OUTPATIENT
Start: 2022-03-18

## 2022-03-18 RX ORDER — UREA 10 %
400 LOTION (ML) TOPICAL DAILY
Qty: 90 TABLET | Refills: 1 | Status: SHIPPED | OUTPATIENT
Start: 2022-03-18

## 2022-03-18 RX ORDER — PANTOPRAZOLE SODIUM 20 MG/1
20 TABLET, DELAYED RELEASE ORAL DAILY
Qty: 90 TABLET | Refills: 1 | Status: SHIPPED | OUTPATIENT
Start: 2022-03-18

## 2022-03-18 RX ORDER — AMLODIPINE BESYLATE 5 MG/1
5 TABLET ORAL DAILY
Qty: 90 TABLET | Refills: 1 | Status: SHIPPED | OUTPATIENT
Start: 2022-03-18 | End: 2022-07-28

## 2022-03-18 NOTE — TELEPHONE ENCOUNTER
Upon review of the In Basket request and the patient's chart, initial outreach has been made via fax, please see Contacts section for details       Thank you  Kemi Orozco

## 2022-03-18 NOTE — PROGRESS NOTES
[address] [place] [address] PRIMARY CARE    NAME: Hailey Galvez  AGE: 61 y o  SEX: female  : 1965     DATE: 3/18/2022     Assessment and Plan:     Problem List Items Addressed This Visit        Endocrine    Type 2 diabetes mellitus without complication, without long-term current use of insulin (HCC)    Relevant Medications    Empagliflozin (Jardiance) 10 MG TABS    Semaglutide,0 25 or 0 5MG/DOS, (Ozempic, 0 25 or 0 5 MG/DOSE,) 2 MG/1 5ML SOPN    lisinopril (ZESTRIL) 40 mg tablet    metFORMIN (GLUCOPHAGE) 1000 MG tablet    Other Relevant Orders    IRIS Diabetic eye exam    POCT hemoglobin A1c (Completed)      Other Visit Diagnoses     Well adult exam    -  Primary    Acute non-recurrent sinusitis, unspecified location        Relevant Medications    azithromycin (Zithromax) 250 mg tablet    GERD (gastroesophageal reflux disease)        Relevant Medications    pantoprazole (PROTONIX) 20 mg tablet    Hypertension, unspecified type        Relevant Medications    amLODIPine (NORVASC) 5 mg tablet    lisinopril (ZESTRIL) 40 mg tablet    Mixed hyperlipidemia        Relevant Medications    atorvastatin (LIPITOR) 10 mg tablet    Healthcare maintenance        Relevant Medications    aspirin (ECOTRIN LOW STRENGTH) 81 mg EC tablet    folic acid (FOLVITE) 017 MCG tablet    Annual physical exam        BMI 29 0-29 9,adult              Immunizations and preventive care screenings were discussed with patient today  Appropriate education was printed on patient's after visit summary  Counseling:  · Dental Health: discussed importance of regular tooth brushing, flossing, and dental visits  BMI Counseling: Body mass index is 29 87 kg/m²   The BMI is above normal  Nutrition recommendations include decreasing portion sizes, encouraging healthy choices of fruits and vegetables, decreasing fast food intake, consuming healthier snacks, limiting drinks that contain sugar, moderation in carbohydrate intake, increasing intake of lean protein, reducing intake of saturated and trans fat and reducing intake of cholesterol  Exercise recommendations include moderate physical activity 150 minutes/week  Rationale for BMI follow-up plan is due to patient being overweight or obese  Depression Screening and Follow-up Plan: Patient was screened for depression during today's encounter  They screened negative with a PHQ-2 score of 0  Return in about 6 months (around 9/18/2022)  Chief Complaint:     Chief Complaint   Patient presents with    Establish Care      History of Present Illness:     Adult Annual Physical   Patient here for a comprehensive physical exam  The patient reports problems - sinus pressure/congestion x 1 week, symtpoms are R sided  Diet and Physical Activity  · Diet/Nutrition: well balanced diet  · Exercise: moderate cardiovascular exercise  Depression Screening  PHQ-2/9 Depression Screening    Little interest or pleasure in doing things: 0 - not at all  Feeling down, depressed, or hopeless: 0 - not at all  PHQ-2 Score: 0  PHQ-2 Interpretation: Negative depression screen       General Health  · Sleep: sleeps poorly  · Hearing: decreased - left  · Vision: no vision problems  · Dental: brushes teeth once daily  Review of Systems:     Review of Systems   Constitutional: Negative for activity change, appetite change, chills, diaphoresis, fatigue, fever and unexpected weight change  HENT: Positive for congestion and sinus pressure  Negative for ear pain, postnasal drip, rhinorrhea, sinus pain, sneezing, sore throat, tinnitus and voice change  Eyes: Negative for pain, redness and visual disturbance  Respiratory: Negative for cough, chest tightness, shortness of breath and wheezing  Cardiovascular: Negative for chest pain, palpitations and leg swelling     Gastrointestinal: Negative for abdominal pain, blood in stool, constipation, diarrhea, nausea and vomiting  Genitourinary: Negative for difficulty urinating, dysuria, frequency, hematuria and urgency  Musculoskeletal: Negative for arthralgias, back pain, gait problem, joint swelling, myalgias, neck pain and neck stiffness  Skin: Negative for color change, pallor, rash and wound  Neurological: Negative for dizziness, tremors, weakness, light-headedness and headaches  Psychiatric/Behavioral: Negative for dysphoric mood, self-injury, sleep disturbance and suicidal ideas  The patient is not nervous/anxious         Past Medical History:     Past Medical History:   Diagnosis Date    Diabetes mellitus ([facility] [address] )     High cholesterol     Hypertension       Past Surgical History:     Past Surgical History:   Procedure Laterality Date    HYSTERECTOMY        Social History:     Social History     Socioeconomic History    Marital status: /Civil Union     Spouse name: None    Number of children: None    Years of education: None    Highest education level: None   Occupational History    None   Tobacco Use    Smoking status: Former Smoker    Smokeless tobacco: Never Used   Vaping Use    Vaping Use: Never used   Substance and Sexual Activity    Alcohol use: Never    Drug use: Never    Sexual activity: None   Other Topics Concern    None   Social History Narrative    None     Social Determinants of Health     Financial Resource Strain: Not on file   Food Insecurity: Not on file   Transportation Needs: Not on file   Physical Activity: Not on file   Stress: Not on file   Social Connections: Not on file   Intimate Partner Violence: Not on file   Housing Stability: Not on file      Family History:     Family History   Problem Relation Age of Onset    Diabetes Mother     Diabetes Father       Current Medications:     Current Outpatient Medications   Medication Sig Dispense Refill    amLODIPine (NORVASC) 5 mg tablet Take 1 tablet (5 mg total) by mouth daily 90 tablet 1    aspirin (ECOTRIN LOW STRENGTH) 81 mg EC tablet Take 1 tablet (81 mg total) by mouth daily 90 tablet 1    atorvastatin (LIPITOR) 10 mg tablet Take 1 tablet (10 mg total) by mouth daily 90 tablet 1    folic acid (FOLVITE) 071 MCG tablet Take 0 5 tablets (400 mcg total) by mouth daily 90 tablet 1    lisinopril (ZESTRIL) 40 mg tablet Take 1 tablet (40 mg total) by mouth daily 90 tablet 1    metFORMIN (GLUCOPHAGE) 1000 MG tablet Take 1 tablet (1,000 mg total) by mouth 2 (two) times a day with meals 180 tablet 1    pantoprazole (PROTONIX) 20 mg tablet Take 1 tablet (20 mg total) by mouth daily 90 tablet 1    azithromycin (Zithromax) 250 mg tablet Day 1 take 2 tablets, days 2-5 take 1 tablet  6 tablet 0    dicyclomine (BENTYL) 20 mg tablet Take 1 tablet (20 mg total) by mouth 2 (two) times a day (Patient not taking: Reported on 3/18/2022 ) 20 tablet 0    Empagliflozin (Jardiance) 10 MG TABS Take 1 tablet (10 mg total) by mouth in the morning 90 tablet 1    Semaglutide,0 25 or 0 5MG/DOS, (Ozempic, 0 25 or 0 5 MG/DOSE,) 2 MG/1 5ML SOPN Inject 0 5 mg under the skin once a week 6 mL 1    sitaGLIPtin (JANUVIA) 100 mg tablet Take 100 mg by mouth daily (Patient not taking: Reported on 3/18/2022 )       No current facility-administered medications for this visit  Allergies:     No Known Allergies   Physical Exam:     /96   Pulse (!) 117   Temp 98 9 °F (37 2 °C)   Ht 5' 11" (1 803 m)   Wt 97 2 kg (214 lb 3 2 oz)   SpO2 98%   BMI 29 87 kg/m²     Physical Exam  Vitals and nursing note reviewed  Constitutional:       General: She is not in acute distress  Appearance: She is well-developed  HENT:      Head: Normocephalic and atraumatic  Right Ear: A middle ear effusion is present  Left Ear: Tympanic membrane normal  Decreased hearing (Pt states had audiogram done 3 months ago that showed 25% hearing loss in L ear   Hearing aids offered at point of care were $5k  Recommend she call [facility] and get 2nd opinion on hearing aide costs  ) noted  Eyes:      Conjunctiva/sclera: Conjunctivae normal    Cardiovascular:      Rate and Rhythm: Normal rate and regular rhythm  Pulses: no weak pulses          Dorsalis pedis pulses are 2+ on the right side and 2+ on the left side  Posterior tibial pulses are 2+ on the right side and 2+ on the left side  Heart sounds: No murmur heard  Pulmonary:      Effort: Pulmonary effort is normal  No respiratory distress  Breath sounds: Normal breath sounds  Abdominal:      Palpations: Abdomen is soft  Tenderness: There is no abdominal tenderness  Musculoskeletal:      Cervical back: Neck supple  Feet:      Right foot:      Skin integrity: No ulcer, skin breakdown, erythema, warmth, callus or dry skin  Left foot:      Skin integrity: No ulcer, skin breakdown, erythema, warmth, callus or dry skin  Skin:     General: Skin is warm and dry  Neurological:      Mental Status: She is alert  Timoteo Madera PA-C  [facility] PRIMARY CARE       Patient's shoes and socks removed  Right Foot/Ankle   Right Foot Inspection  Skin Exam: skin normal and skin intact  No dry skin, no warmth, no callus, no erythema, no maceration, no abnormal color, no pre-ulcer, no ulcer and no callus  Toe Exam:  no right toe deformity    Sensory   Monofilament testing: intact    Vascular  Capillary refills: < 3 seconds  The right DP pulse is 2+  The right PT pulse is 2+  Left Foot/Ankle  Left Foot Inspection  Skin Exam: skin normal and skin intact  No dry skin, no warmth, no erythema, no maceration, normal color, no pre-ulcer, no ulcer and no callus  Toe Exam: No left toe deformity  Sensory   Monofilament testing: intact    Vascular  Capillary refills: < 3 seconds  The left DP pulse is 2+  The left PT pulse is 2+       Assign Risk Category  No deformity present  No loss of protective sensation  No weak pulses  Risk: 0

## 2022-03-18 NOTE — LETTER
Procedure Request Form: Mammogram      Date Requested: 22  Patient: Brigette Wright  Patient : 1965   Referring Provider: Gudelia Quintanilla PA-C        Date of Procedure ______________________________       The above patient has informed us that they have completed their   most recent Mammogram at your facility  Please complete   this form and attach all corresponding procedure reports/results  Comments __________________________________________________________  ____________________________________________________________________  ____________________________________________________________________  ____________________________________________________________________    Facility Completing Procedure _________________________________________    Form Completed By (print name) _______________________________________      Signature __________________________________________________________      These reports are needed for  compliance  Please fax this completed form and a copy of the procedure report to our office located at [place] [address] as soon as possible to [phone] jimy Humphries: Phone [phone]    We thank you for your assistance in treating our mutual patient

## 2022-03-18 NOTE — PATIENT INSTRUCTIONS

## 2022-03-18 NOTE — TELEPHONE ENCOUNTER
----- Message from [facility] sent at 3/18/2022  9:26 AM EDT -----  Regarding: Mammogram  03/18/22 9:26 AM    Hello, our patient Inder Hernandez has had Mammogram completed/performed  Please assist in updating the patient chart by making an External outreach [facility] [address]  The date of service is within 4 months      Thank you,  Emelia Lopez   [facility]

## 2022-03-25 NOTE — TELEPHONE ENCOUNTER
As a follow-up, a second attempt has been made for outreach via fax, please see Contacts section for details      Thank you  nUa Burgos

## 2022-05-24 ENCOUNTER — TELEPHONE (OUTPATIENT)
Dept: ADMINISTRATIVE | Facility: OTHER | Age: 57
End: 2022-05-24

## 2022-05-24 NOTE — TELEPHONE ENCOUNTER
----- Message from Arminda Gillespie sent at 5/23/2022  4:21 PM EDT -----  Regarding: care gap request  05/23/22 4:21 PM    Hello, our patient attached above has had Mammogram completed/performed  Please assist in updating the patient chart by pulling the Care Everywhere (CE) document  The date of service is 10/2021       Thank you,  Arminda [facility] [address]

## 2022-05-24 NOTE — TELEPHONE ENCOUNTER
Upon review of the In Basket request we were able to locate, review, and update the patient chart as requested for Mammogram     Any additional questions or concerns should be emailed to the Practice Liaisons via [email or web address] email, please do not reply via In Basket      Thank you  Henrietta Estes

## 2022-07-28 DIAGNOSIS — I10 HYPERTENSION, UNSPECIFIED TYPE: ICD-10-CM

## 2022-07-28 DIAGNOSIS — E78.2 MIXED HYPERLIPIDEMIA: ICD-10-CM

## 2022-07-28 DIAGNOSIS — E11.9 TYPE 2 DIABETES MELLITUS WITHOUT COMPLICATION, WITHOUT LONG-TERM CURRENT USE OF INSULIN (HCC): ICD-10-CM

## 2022-07-28 RX ORDER — ATORVASTATIN CALCIUM 10 MG/1
TABLET, FILM COATED ORAL
Qty: 90 TABLET | Refills: 1 | Status: SHIPPED | OUTPATIENT
Start: 2022-07-28

## 2022-07-28 RX ORDER — LISINOPRIL 40 MG/1
TABLET ORAL
Qty: 90 TABLET | Refills: 1 | Status: SHIPPED | OUTPATIENT
Start: 2022-07-28

## 2022-07-28 RX ORDER — AMLODIPINE BESYLATE 5 MG/1
TABLET ORAL
Qty: 90 TABLET | Refills: 1 | Status: SHIPPED | OUTPATIENT
Start: 2022-07-28

## 2022-08-29 ENCOUNTER — TELEPHONE (OUTPATIENT)
Dept: FAMILY MEDICINE CLINIC | Facility: CLINIC | Age: 57
End: 2022-08-29

## 2022-08-29 DIAGNOSIS — E11.319 DIABETIC RETINOPATHY ASSOCIATED WITH TYPE 2 DIABETES MELLITUS, MACULAR EDEMA PRESENCE UNSPECIFIED, UNSPECIFIED LATERALITY, UNSPECIFIED RETINOPATHY SEVERITY (HCC): ICD-10-CM

## 2022-08-29 DIAGNOSIS — E11.9 TYPE 2 DIABETES MELLITUS WITHOUT COMPLICATION, WITHOUT LONG-TERM CURRENT USE OF INSULIN (HCC): Primary | ICD-10-CM

## 2022-08-29 NOTE — TELEPHONE ENCOUNTER
Pt had iris done a few months ago in our office and it showed mild diabetic retinopathy left eye  Requesting a referral to [facility] eye specialists    Fax # [phone]

## 2022-09-05 DIAGNOSIS — Z00.00 HEALTHCARE MAINTENANCE: ICD-10-CM

## 2022-09-07 RX ORDER — ASPIRIN 81 MG/1
TABLET, FILM COATED ORAL
Qty: 90 TABLET | Refills: 1 | Status: SHIPPED | OUTPATIENT
Start: 2022-09-07

## 2022-09-09 DIAGNOSIS — K21.9 GERD (GASTROESOPHAGEAL REFLUX DISEASE): ICD-10-CM

## 2022-09-09 DIAGNOSIS — E11.9 TYPE 2 DIABETES MELLITUS WITHOUT COMPLICATION, WITHOUT LONG-TERM CURRENT USE OF INSULIN (HCC): ICD-10-CM

## 2022-09-12 RX ORDER — PANTOPRAZOLE SODIUM 20 MG/1
TABLET, DELAYED RELEASE ORAL
Qty: 90 TABLET | Refills: 1 | Status: SHIPPED | OUTPATIENT
Start: 2022-09-12 | End: 2022-10-22

## 2022-09-15 ENCOUNTER — RA CDI HCC (OUTPATIENT)
Dept: OTHER | Facility: HOSPITAL | Age: 57
End: 2022-09-15

## 2022-09-15 NOTE — PROGRESS NOTES
[facility]  coding opportunities       Chart reviewed, no opportunity found: CHART REVIEWED, NO OPPORTUNITY FOUND        Patients Insurance        Commercial Insurance: [employer]

## 2022-09-16 ENCOUNTER — OFFICE VISIT (OUTPATIENT)
Dept: FAMILY MEDICINE CLINIC | Facility: CLINIC | Age: 57
End: 2022-09-16
Payer: COMMERCIAL

## 2022-09-16 ENCOUNTER — TELEPHONE (OUTPATIENT)
Dept: ADMINISTRATIVE | Facility: OTHER | Age: 57
End: 2022-09-16

## 2022-09-16 VITALS
BODY MASS INDEX: 29.23 KG/M2 | HEART RATE: 105 BPM | HEIGHT: 71 IN | TEMPERATURE: 97.7 F | WEIGHT: 208.8 LBS | DIASTOLIC BLOOD PRESSURE: 78 MMHG | SYSTOLIC BLOOD PRESSURE: 124 MMHG | OXYGEN SATURATION: 98 %

## 2022-09-16 DIAGNOSIS — Z23 NEED FOR INFLUENZA VACCINATION: ICD-10-CM

## 2022-09-16 DIAGNOSIS — E78.2 MIXED HYPERLIPIDEMIA: ICD-10-CM

## 2022-09-16 DIAGNOSIS — N39.0 URINARY TRACT INFECTION WITHOUT HEMATURIA, SITE UNSPECIFIED: ICD-10-CM

## 2022-09-16 DIAGNOSIS — E11.9 TYPE 2 DIABETES MELLITUS WITHOUT COMPLICATION, WITHOUT LONG-TERM CURRENT USE OF INSULIN (HCC): Primary | ICD-10-CM

## 2022-09-16 DIAGNOSIS — E11.65 TYPE 2 DIABETES MELLITUS WITH HYPERGLYCEMIA, WITHOUT LONG-TERM CURRENT USE OF INSULIN (HCC): ICD-10-CM

## 2022-09-16 LAB
SL AMB  POCT GLUCOSE, UA: ABNORMAL
SL AMB LEUKOCYTE ESTERASE,UA: NEGATIVE
SL AMB POCT BILIRUBIN,UA: NEGATIVE
SL AMB POCT BLOOD,UA: NEGATIVE
SL AMB POCT CLARITY,UA: ABNORMAL
SL AMB POCT COLOR,UA: YELLOW
SL AMB POCT HEMOGLOBIN AIC: 7.3 (ref ?–6.5)
SL AMB POCT KETONES,UA: ABNORMAL
SL AMB POCT NITRITE,UA: NEGATIVE
SL AMB POCT PH,UA: 5.5
SL AMB POCT SPECIFIC GRAVITY,UA: 1.02
SL AMB POCT URINE PROTEIN: NEGATIVE
SL AMB POCT UROBILINOGEN: NEGATIVE

## 2022-09-16 PROCEDURE — 90471 IMMUNIZATION ADMIN: CPT | Performed by: PHYSICIAN ASSISTANT

## 2022-09-16 PROCEDURE — 90682 RIV4 VACC RECOMBINANT DNA IM: CPT | Performed by: PHYSICIAN ASSISTANT

## 2022-09-16 PROCEDURE — 3078F DIAST BP <80 MM HG: CPT | Performed by: PHYSICIAN ASSISTANT

## 2022-09-16 PROCEDURE — 3051F HG A1C>EQUAL 7.0%<8.0%: CPT | Performed by: PHYSICIAN ASSISTANT

## 2022-09-16 PROCEDURE — 3061F NEG MICROALBUMINURIA REV: CPT | Performed by: PHYSICIAN ASSISTANT

## 2022-09-16 PROCEDURE — 3725F SCREEN DEPRESSION PERFORMED: CPT | Performed by: PHYSICIAN ASSISTANT

## 2022-09-16 PROCEDURE — 87086 URINE CULTURE/COLONY COUNT: CPT | Performed by: PHYSICIAN ASSISTANT

## 2022-09-16 PROCEDURE — 81002 URINALYSIS NONAUTO W/O SCOPE: CPT | Performed by: PHYSICIAN ASSISTANT

## 2022-09-16 PROCEDURE — 3074F SYST BP LT 130 MM HG: CPT | Performed by: PHYSICIAN ASSISTANT

## 2022-09-16 PROCEDURE — 83036 HEMOGLOBIN GLYCOSYLATED A1C: CPT | Performed by: PHYSICIAN ASSISTANT

## 2022-09-16 PROCEDURE — 99214 OFFICE O/P EST MOD 30 MIN: CPT | Performed by: PHYSICIAN ASSISTANT

## 2022-09-16 NOTE — TELEPHONE ENCOUNTER
Upon review of the In Basket request and the patient's chart, initial outreach has been made via fax, please see Contacts section for details        Att  x1 colo    Thank you  Wilberto Bueno

## 2022-09-16 NOTE — TELEPHONE ENCOUNTER
Upon review of the In Basket request we have found this is a duplicate request and no further action is needed  This request is currently being processed and documentation is being completed in the initial request  This message will now be closed  No attachment in CE      Any additional questions or concerns should be emailed to the Practice Liaisons via [email or web address] email, please do not reply via In Basket      Thank you  Pineda Busby

## 2022-09-16 NOTE — PROGRESS NOTES
Assessment/Plan:    Problem List Items Addressed This Visit        Endocrine    Type 2 diabetes mellitus with hyperglycemia, without long-term current use of insulin ([facility]) - Primary    Relevant Medications    Empagliflozin (Jardiance) 25 MG TABS      Other Visit Diagnoses     Urinary tract infection without hematuria, site unspecified        Given written Rx for Cipro 500 mg BID x 5 days  Relevant Orders    POCT urine dip    Urine culture    Mixed hyperlipidemia        Relevant Orders    Lipid Panel with Direct LDL reflex           Diagnoses and all orders for this visit:    Type 2 diabetes mellitus without complication, without long-term current use of insulin ([facility])  -     POCT hemoglobin A1c  -     Empagliflozin (Jardiance) 25 MG TABS; Take 1 tablet (25 mg total) by mouth in the morning  -     CBC; Future  -     Comprehensive metabolic panel; Future    Urinary tract infection without hematuria, site unspecified  Comments:  Given written Rx for Cipro 500 mg BID x 5 days  Orders:  -     POCT urine dip  -     Urine culture; Future    Mixed hyperlipidemia  -     Lipid Panel with Direct LDL reflex; Future    Type 2 diabetes mellitus with hyperglycemia, without long-term current use of insulin ([facility])    D/C Ozempic due to GI side effects, increase Jardiance to 25 mg daily  Subjective:      Patient ID: Mindi Lindsay is a 61 y o  female  Don Major is here today for routine follow up appointment  Complains of GI side effects with Ozempic, would like to D/C medication  A1C today is good at 7 3%  She also complains of burning with urination x 2 weeks, has not seen any blood in urine, denies fevers/chills  The following portions of the patient's history were reviewed and updated as appropriate:   She has a past medical history of Diabetes mellitus ([address] ), High cholesterol, and Hypertension  ,  does not have any pertinent problems on file  ,   has a past surgical history that includes Hysterectomy  ,  family history includes Diabetes in her father and mother  ,   reports that she has quit smoking  She has never used smokeless tobacco  She reports that she does not drink alcohol and does not use drugs  ,  is allergic to ozempic (0 25 or 0 5 mg-dose) [semaglutide(0 25 or 0 5mg-dos)]     Current Outpatient Medications   Medication Sig Dispense Refill    Adult Aspirin Regimen 81 MG EC tablet TAKE ONE TABLET BY MOUTH EVERY DAY 90 tablet 1    amLODIPine (NORVASC) 5 mg tablet TAKE ONE TABLET BY MOUTH EVERY DAY 90 tablet 1    atorvastatin (LIPITOR) 10 mg tablet TAKE ONE TABLET BY MOUTH EVERY DAY 90 tablet 1    Empagliflozin (Jardiance) 25 MG TABS Take 1 tablet (25 mg total) by mouth in the morning 90 tablet 1    folic acid (FOLVITE) 527 MCG tablet Take 0 5 tablets (400 mcg total) by mouth daily 90 tablet 1    lisinopril (ZESTRIL) 40 mg tablet TAKE ONE TABLET BY MOUTH EVERY DAY 90 tablet 1    metFORMIN (GLUCOPHAGE) 1000 MG tablet TAKE ONE TABLET BY MOUTH TWICE A DAY WITH MEALS 180 tablet 1    pantoprazole (PROTONIX) 20 mg tablet TAKE ONE TABLET BY MOUTH EVERY DAY 90 tablet 1     No current facility-administered medications for this visit  Review of Systems   Constitutional: Negative for activity change, appetite change, chills, diaphoresis, fatigue, fever and unexpected weight change  HENT: Negative for congestion, ear pain, postnasal drip, rhinorrhea, sinus pressure, sinus pain, sneezing, sore throat, tinnitus and voice change  Eyes: Negative for pain, redness and visual disturbance  Respiratory: Negative for cough, chest tightness, shortness of breath and wheezing  Cardiovascular: Negative for chest pain, palpitations and leg swelling  Gastrointestinal: Positive for abdominal pain  Negative for blood in stool, constipation, diarrhea, nausea and vomiting  Increased gas with Ozempic   Genitourinary: Negative for difficulty urinating, dysuria, frequency, hematuria and urgency          Painful urination   Musculoskeletal: Negative for arthralgias, back pain, gait problem, joint swelling, myalgias, neck pain and neck stiffness  Skin: Negative for color change, pallor, rash and wound  Neurological: Negative for dizziness, tremors, weakness, light-headedness and headaches  Psychiatric/Behavioral: Negative for dysphoric mood, self-injury, sleep disturbance and suicidal ideas  The patient is not nervous/anxious  Objective:  Vitals:    09/16/22 0851   BP: 124/78   Pulse: 105   Temp: 97 7 °F (36 5 °C)   SpO2: 98%   Weight: 94 7 kg (208 lb 12 8 oz)   Height: 5' 11" (1 803 m)     Body mass index is 29 12 kg/m²  Physical Exam  Vitals reviewed  Constitutional:       General: She is not in acute distress  Appearance: She is well-developed  She is not diaphoretic  HENT:      Head: Normocephalic and atraumatic  Right Ear: Hearing, tympanic membrane, ear canal and external ear normal       Left Ear: Hearing, tympanic membrane, ear canal and external ear normal       Mouth/Throat:      Pharynx: Uvula midline  No oropharyngeal exudate  Eyes:      General: No scleral icterus  Right eye: No discharge  Left eye: No discharge  Conjunctiva/sclera: Conjunctivae normal    Neck:      Thyroid: No thyromegaly  Vascular: No carotid bruit  Cardiovascular:      Rate and Rhythm: Normal rate and regular rhythm  Heart sounds: Normal heart sounds  No murmur heard  Pulmonary:      Effort: Pulmonary effort is normal  No respiratory distress  Breath sounds: Normal breath sounds  No wheezing  Abdominal:      General: Bowel sounds are normal  There is no distension  Palpations: Abdomen is soft  There is no mass  Tenderness: There is no abdominal tenderness  There is no guarding or rebound  Musculoskeletal:         General: No tenderness  Normal range of motion  Cervical back: Neck supple  Lymphadenopathy:      Cervical: No cervical adenopathy  Skin:     General: Skin is warm and dry  Findings: No erythema or rash  Neurological:      Mental Status: She is alert and oriented to person, place, and time  Psychiatric:         Behavior: Behavior normal          Thought Content:  Thought content normal          Judgment: Judgment normal

## 2022-09-16 NOTE — LETTER
Procedure Request Form: Colonoscopy      Date Requested: 22  Patient: Marcela Mohan  Patient : 1965   Referring Provider: Pepe Hightower, PA-C        Date of Procedure ______________________________       The above patient has informed us that they have completed their   most recent Colonoscopy at your facility  Please complete   this form and attach all corresponding procedure reports/results  Comments ____Colo appx date - 2020 ______________________________________________________  ____________________________________________________________________  ____________________________________________________________________  ____________________________________________________________________    Facility Completing Procedure _________________________________________    Form Completed By (print name) _______________________________________      Signature __________________________________________________________      These reports are needed for  compliance  Please fax this completed form and a copy of the procedure report to our office located at [place] [address] as soon as possible to [phone] jimy Mcdonald: Phone [phone]    We thank you for your assistance in treating our mutual patient

## 2022-09-16 NOTE — TELEPHONE ENCOUNTER
----- Message from [facility] sent at 9/16/2022 11:53 AM EDT -----  Regarding: Colonscopy  09/16/22 11:53 AM    Hello, our patient Suzanne Romo has had CRC: Colonoscopy completed/performed  Please assist in updating the patient chart by making an External outreach to Dr Sheila Vines facility located in [address]  The date of service is within 4 years      Thank you,  Emelia Lopez   [facility]

## 2022-09-16 NOTE — LETTER
Procedure Request Form: Colonoscopy      Date Requested: 10/03/22  Patient: Janette Fuentesck  Patient : 1965   Referring Provider: David Prieto PASAMMI        Date of Procedure ______________________________       The above patient has informed us that they have completed their   most recent Colonoscopy at your facility  Please complete   this form and attach all corresponding procedure reports/results  Comments _____Att Clio Polio  ___________________________________________________  ____________________________________________________________________  ____________________________________________________________________  ____________________________________________________________________    Facility Completing Procedure _________________________________________    Form Completed By (print name) _______________________________________      Signature __________________________________________________________      These reports are needed for  compliance  Please fax this completed form and a copy of the procedure report to our office located at [place] [address] as soon as possible Fax  to [phone] jimy Gutierrez: Phone [phone]    We thank you for your assistance in treating our mutual patient

## 2022-09-16 NOTE — TELEPHONE ENCOUNTER
----- Message from [facility] sent at 9/16/2022 11:53 AM EDT -----  Regarding: Colonscopy  09/16/22 11:53 AM    Hello, our patient Pretty Sullivan has had CRC: Colonoscopy completed/performed  Please assist in updating the patient chart by making an External outreach to Dr César Sosa facility located in [place], [place]  The date of service is within 4 years      Thank you,  Emelia Lopez   [facility]

## 2022-09-16 NOTE — TELEPHONE ENCOUNTER
----- Message from [facility] sent at 9/16/2022  1:06 PM EDT -----  Regarding: Colonoscopy  09/16/22 1:06 PM    Hello, our patient Sg Sherwood has had CRC: Colonoscopy completed/performed  Please assist in updating the patient chart by pulling the Care Everywhere (CE) document  The date of service is 02/10/2020       Thank you,  Emelia Lopez   [facility]

## 2022-09-16 NOTE — LETTER
Procedure Request Form: Colonoscopy      Date Requested: 22  Patient: Paxton Corpus  Patient : 1965   Referring Provider: Carol Castañeda PA-C        Date of Procedure ______________________________       The above patient has informed us that they have completed their   most recent Colonoscopy at your facility  Please complete   this form and attach all corresponding procedure reports/results  Comments ___Attention Ileana Jara 02/10/2020   _______________________________________________________  ____________________________________________________________________  ____________________________________________________________________  ____________________________________________________________________    Facility Completing Procedure _________________________________________    Form Completed By (print name) _______________________________________      Signature __________________________________________________________      These reports are needed for  compliance  Please fax this completed form and a copy of the procedure report to our office located at [place] [address] as soon as possible to [phone] jimy Franco Man: Phone [phone]    We thank you for your assistance in treating our mutual patient

## 2022-09-17 LAB — BACTERIA UR CULT: NORMAL

## 2022-09-22 NOTE — TELEPHONE ENCOUNTER
Upon review of the In Basket request and the patient's chart, initial outreach has been made via fax, please see Contacts section for details   fx and call - to fx att Estelita Fix x1  [id number]    Thank you  Dustin Rios

## 2022-09-23 NOTE — TELEPHONE ENCOUNTER
As a follow-up, a second attempt has been made for outreach via fax, please see Contacts section for details        colo x2  fx    Thank you  Yeni Orr

## 2022-10-03 NOTE — TELEPHONE ENCOUNTER
As a final attempt, a third outreach has been made via fax  Please see Contacts section for details  This encounter will be closed and completed by end of day  Should we receive the requested information because of previous outreach attempts, the requested patient's chart will be updated appropriately       Thank you  Tasha Navarro

## 2022-10-05 LAB
LEFT EYE DIABETIC RETINOPATHY: POSITIVE
RIGHT EYE DIABETIC RETINOPATHY: POSITIVE

## 2022-10-10 DIAGNOSIS — E11.9 TYPE 2 DIABETES MELLITUS WITHOUT COMPLICATION, WITHOUT LONG-TERM CURRENT USE OF INSULIN (HCC): Primary | ICD-10-CM

## 2022-10-10 RX ORDER — BLOOD SUGAR DIAGNOSTIC
STRIP MISCELLANEOUS
Qty: 100 STRIP | Refills: 3 | Status: SHIPPED | OUTPATIENT
Start: 2022-10-10

## 2022-10-21 ENCOUNTER — APPOINTMENT (INPATIENT)
Dept: ULTRASOUND IMAGING | Facility: HOSPITAL | Age: 57
End: 2022-10-21
Payer: COMMERCIAL

## 2022-10-21 ENCOUNTER — HOSPITAL ENCOUNTER (INPATIENT)
Facility: HOSPITAL | Age: 57
LOS: 1 days | Discharge: HOME | End: 2022-10-22
Attending: EMERGENCY MEDICINE | Admitting: INTERNAL MEDICINE
Payer: COMMERCIAL

## 2022-10-21 ENCOUNTER — APPOINTMENT (EMERGENCY)
Dept: CT IMAGING | Facility: HOSPITAL | Age: 57
End: 2022-10-21
Payer: COMMERCIAL

## 2022-10-21 DIAGNOSIS — K76.0 FATTY LIVER: ICD-10-CM

## 2022-10-21 DIAGNOSIS — E11.9 TYPE 2 DIABETES MELLITUS WITHOUT COMPLICATION, WITHOUT LONG-TERM CURRENT USE OF INSULIN (HCC): ICD-10-CM

## 2022-10-21 DIAGNOSIS — E11.10 DIABETIC KETOACIDOSIS WITHOUT COMA ASSOCIATED WITH TYPE 2 DIABETES MELLITUS (HCC): ICD-10-CM

## 2022-10-21 DIAGNOSIS — R77.8 ELEVATED TROPONIN LEVEL: ICD-10-CM

## 2022-10-21 DIAGNOSIS — E87.29 METABOLIC ACIDOSIS, INCREASED ANION GAP: Primary | ICD-10-CM

## 2022-10-21 DIAGNOSIS — R78.89 ELEVATED BETA-HYDROXYBUTYRATE: ICD-10-CM

## 2022-10-21 PROBLEM — D58.2 ELEVATED HEMOGLOBIN (HCC): Status: ACTIVE | Noted: 2022-10-21

## 2022-10-21 PROBLEM — R10.84 ABDOMINAL PAIN, COLICKY: Status: ACTIVE | Noted: 2022-10-21

## 2022-10-21 PROBLEM — E11.01 HHNC (HYPERGLYCEMIC HYPEROSMOLAR NONKETOTIC COMA) (HCC): Status: ACTIVE | Noted: 2022-10-21

## 2022-10-21 PROBLEM — R16.0 HEPATOMEGALY: Status: ACTIVE | Noted: 2022-10-21

## 2022-10-21 PROBLEM — D17.79 MYELOLIPOMA OF RIGHT ADRENAL GLAND: Status: ACTIVE | Noted: 2022-10-21

## 2022-10-21 PROBLEM — R19.7 DIARRHEA: Status: ACTIVE | Noted: 2022-10-21

## 2022-10-21 LAB
ALBUMIN SERPL BCP-MCNC: 4.7 G/DL (ref 3.5–5)
ALP SERPL-CCNC: 68 U/L (ref 46–116)
ALT SERPL W P-5'-P-CCNC: 31 U/L (ref 12–78)
ANION GAP SERPL CALCULATED.3IONS-SCNC: 12 MMOL/L (ref 4–13)
ANION GAP SERPL CALCULATED.3IONS-SCNC: 18 MMOL/L (ref 4–13)
ANION GAP SERPL CALCULATED.3IONS-SCNC: 18 MMOL/L (ref 4–13)
ARTERIAL PATENCY WRIST A: YES
AST SERPL W P-5'-P-CCNC: 16 U/L (ref 5–45)
ATRIAL RATE: 99 BPM
BACTERIA UR QL AUTO: NORMAL /HPF
BASE EX.OXY STD BLDV CALC-SCNC: 86.5 % (ref 60–80)
BASE EXCESS BLDA CALC-SCNC: -8.4 MMOL/L
BASE EXCESS BLDV CALC-SCNC: -8.9 MMOL/L
BASOPHILS # BLD AUTO: 0.07 THOUSANDS/ÂΜL (ref 0–0.1)
BASOPHILS NFR BLD AUTO: 1 % (ref 0–1)
BETA-HYDROXYBUTYRATE: 5 MMOL/L
BILIRUB SERPL-MCNC: 0.43 MG/DL (ref 0.2–1)
BILIRUB UR QL STRIP: NEGATIVE
BUN SERPL-MCNC: 14 MG/DL (ref 5–25)
BUN SERPL-MCNC: 17 MG/DL (ref 5–25)
BUN SERPL-MCNC: 20 MG/DL (ref 5–25)
CALCIUM SERPL-MCNC: 10 MG/DL (ref 8.3–10.1)
CALCIUM SERPL-MCNC: 9.1 MG/DL (ref 8.3–10.1)
CALCIUM SERPL-MCNC: 9.2 MG/DL (ref 8.3–10.1)
CARDIAC TROPONIN I PNL SERPL HS: 6 NG/L (ref 8–18)
CHLORIDE SERPL-SCNC: 101 MMOL/L (ref 96–108)
CHLORIDE SERPL-SCNC: 103 MMOL/L (ref 96–108)
CHLORIDE SERPL-SCNC: 106 MMOL/L (ref 96–108)
CLARITY UR: CLEAR
CO2 SERPL-SCNC: 18 MMOL/L (ref 21–32)
CO2 SERPL-SCNC: 19 MMOL/L (ref 21–32)
CO2 SERPL-SCNC: 20 MMOL/L (ref 21–32)
COLOR UR: ABNORMAL
CREAT SERPL-MCNC: 0.61 MG/DL (ref 0.6–1.3)
CREAT SERPL-MCNC: 0.68 MG/DL (ref 0.6–1.3)
CREAT SERPL-MCNC: 0.78 MG/DL (ref 0.6–1.3)
EOSINOPHIL # BLD AUTO: 0.07 THOUSAND/ÂΜL (ref 0–0.61)
EOSINOPHIL NFR BLD AUTO: 1 % (ref 0–6)
ERYTHROCYTE [DISTWIDTH] IN BLOOD BY AUTOMATED COUNT: 12.7 % (ref 11.6–15.1)
FLUAV RNA RESP QL NAA+PROBE: NEGATIVE
FLUBV RNA RESP QL NAA+PROBE: NEGATIVE
GFR SERPL CREATININE-BSD FRML MDRD: 100 ML/MIN/1.73SQ M
GFR SERPL CREATININE-BSD FRML MDRD: 84 ML/MIN/1.73SQ M
GFR SERPL CREATININE-BSD FRML MDRD: 96 ML/MIN/1.73SQ M
GLUCOSE SERPL-MCNC: 104 MG/DL (ref 65–140)
GLUCOSE SERPL-MCNC: 107 MG/DL (ref 65–140)
GLUCOSE SERPL-MCNC: 110 MG/DL (ref 65–140)
GLUCOSE SERPL-MCNC: 111 MG/DL (ref 65–140)
GLUCOSE SERPL-MCNC: 121 MG/DL (ref 65–140)
GLUCOSE SERPL-MCNC: 124 MG/DL (ref 65–140)
GLUCOSE SERPL-MCNC: 129 MG/DL (ref 65–140)
GLUCOSE SERPL-MCNC: 135 MG/DL (ref 65–140)
GLUCOSE SERPL-MCNC: 163 MG/DL (ref 65–140)
GLUCOSE SERPL-MCNC: 191 MG/DL (ref 65–140)
GLUCOSE SERPL-MCNC: 85 MG/DL (ref 65–140)
GLUCOSE SERPL-MCNC: 97 MG/DL (ref 65–140)
GLUCOSE UR STRIP-MCNC: ABNORMAL MG/DL
HCO3 BLDA-SCNC: 14.8 MMOL/L (ref 22–28)
HCO3 BLDV-SCNC: 15.5 MMOL/L (ref 24–30)
HCT VFR BLD AUTO: 49.2 % (ref 34.8–46.1)
HGB BLD-MCNC: 16.4 G/DL (ref 11.5–15.4)
HGB UR QL STRIP.AUTO: NEGATIVE
IMM GRANULOCYTES # BLD AUTO: 0.04 THOUSAND/UL (ref 0–0.2)
IMM GRANULOCYTES NFR BLD AUTO: 0 % (ref 0–2)
KETONES UR STRIP-MCNC: ABNORMAL MG/DL
LACTATE SERPL-SCNC: 1.4 MMOL/L (ref 0.5–2)
LEUKOCYTE ESTERASE UR QL STRIP: ABNORMAL
LIPASE SERPL-CCNC: 156 U/L (ref 73–393)
LYMPHOCYTES # BLD AUTO: 2.3 THOUSANDS/ÂΜL (ref 0.6–4.47)
LYMPHOCYTES NFR BLD AUTO: 22 % (ref 14–44)
MAGNESIUM SERPL-MCNC: 1.9 MG/DL (ref 1.6–2.6)
MCH RBC QN AUTO: 32.2 PG (ref 26.8–34.3)
MCHC RBC AUTO-ENTMCNC: 33.3 G/DL (ref 31.4–37.4)
MCV RBC AUTO: 97 FL (ref 82–98)
MONOCYTES # BLD AUTO: 0.83 THOUSAND/ÂΜL (ref 0.17–1.22)
MONOCYTES NFR BLD AUTO: 8 % (ref 4–12)
NEUTROPHILS # BLD AUTO: 7.13 THOUSANDS/ÂΜL (ref 1.85–7.62)
NEUTS SEG NFR BLD AUTO: 68 % (ref 43–75)
NITRITE UR QL STRIP: NEGATIVE
NON VENT ROOM AIR: 21 %
NON-SQ EPI CELLS URNS QL MICRO: NORMAL /HPF
NRBC BLD AUTO-RTO: 0 /100 WBCS
O2 CT BLDA-SCNC: 20.6 ML/DL (ref 16–23)
O2 CT BLDV-SCNC: 19.7 ML/DL
OXYHGB MFR BLDA: 95.6 % (ref 94–97)
P AXIS: 35 DEGREES
PCO2 BLDA: 25.7 MM HG (ref 36–44)
PCO2 BLDV: 30.2 MM HG (ref 42–50)
PH BLDA: 7.38 [PH] (ref 7.35–7.45)
PH BLDV: 7.33 [PH] (ref 7.3–7.4)
PH UR STRIP.AUTO: 5.5 [PH]
PHOSPHATE SERPL-MCNC: 4.2 MG/DL (ref 2.7–4.5)
PLATELET # BLD AUTO: 325 THOUSANDS/UL (ref 149–390)
PMV BLD AUTO: 10.9 FL (ref 8.9–12.7)
PO2 BLDA: 86.6 MM HG (ref 75–129)
PO2 BLDV: 56.9 MM HG (ref 35–45)
POTASSIUM SERPL-SCNC: 3.7 MMOL/L (ref 3.5–5.3)
POTASSIUM SERPL-SCNC: 3.7 MMOL/L (ref 3.5–5.3)
POTASSIUM SERPL-SCNC: 4.2 MMOL/L (ref 3.5–5.3)
PR INTERVAL: 150 MS
PROT SERPL-MCNC: 8.1 G/DL (ref 6.4–8.4)
PROT UR STRIP-MCNC: NEGATIVE MG/DL
QRS AXIS: 4 DEGREES
QRSD INTERVAL: 82 MS
QT INTERVAL: 354 MS
QTC INTERVAL: 454 MS
RBC # BLD AUTO: 5.09 MILLION/UL (ref 3.81–5.12)
RBC #/AREA URNS AUTO: NORMAL /HPF
RSV RNA RESP QL NAA+PROBE: NEGATIVE
SARS-COV-2 RNA RESP QL NAA+PROBE: NEGATIVE
SODIUM SERPL-SCNC: 138 MMOL/L (ref 135–147)
SODIUM SERPL-SCNC: 138 MMOL/L (ref 135–147)
SODIUM SERPL-SCNC: 139 MMOL/L (ref 135–147)
SP GR UR STRIP.AUTO: 1.01 (ref 1–1.03)
SPECIMEN SOURCE: ABNORMAL
T WAVE AXIS: 26 DEGREES
UROBILINOGEN UR QL STRIP.AUTO: 0.2 E.U./DL
VENTRICULAR RATE: 99 BPM
WBC # BLD AUTO: 10.44 THOUSAND/UL (ref 4.31–10.16)
WBC #/AREA URNS AUTO: NORMAL /HPF

## 2022-10-21 PROCEDURE — 80048 BASIC METABOLIC PNL TOTAL CA: CPT | Performed by: INTERNAL MEDICINE

## 2022-10-21 PROCEDURE — 83605 ASSAY OF LACTIC ACID: CPT | Performed by: EMERGENCY MEDICINE

## 2022-10-21 PROCEDURE — 81001 URINALYSIS AUTO W/SCOPE: CPT | Performed by: EMERGENCY MEDICINE

## 2022-10-21 PROCEDURE — 84484 ASSAY OF TROPONIN QUANT: CPT | Performed by: EMERGENCY MEDICINE

## 2022-10-21 PROCEDURE — 80053 COMPREHEN METABOLIC PANEL: CPT

## 2022-10-21 PROCEDURE — 99285 EMERGENCY DEPT VISIT HI MDM: CPT

## 2022-10-21 PROCEDURE — 82948 REAGENT STRIP/BLOOD GLUCOSE: CPT

## 2022-10-21 PROCEDURE — 74177 CT ABD & PELVIS W/CONTRAST: CPT

## 2022-10-21 PROCEDURE — 87086 URINE CULTURE/COLONY COUNT: CPT | Performed by: INTERNAL MEDICINE

## 2022-10-21 PROCEDURE — 83690 ASSAY OF LIPASE: CPT

## 2022-10-21 PROCEDURE — 36600 WITHDRAWAL OF ARTERIAL BLOOD: CPT

## 2022-10-21 PROCEDURE — 99291 CRITICAL CARE FIRST HOUR: CPT | Performed by: EMERGENCY MEDICINE

## 2022-10-21 PROCEDURE — 96365 THER/PROPH/DIAG IV INF INIT: CPT

## 2022-10-21 PROCEDURE — 96366 THER/PROPH/DIAG IV INF ADDON: CPT

## 2022-10-21 PROCEDURE — 93005 ELECTROCARDIOGRAM TRACING: CPT

## 2022-10-21 PROCEDURE — 85025 COMPLETE CBC W/AUTO DIFF WBC: CPT

## 2022-10-21 PROCEDURE — G1004 CDSM NDSC: HCPCS

## 2022-10-21 PROCEDURE — 0241U HB NFCT DS VIR RESP RNA 4 TRGT: CPT | Performed by: INTERNAL MEDICINE

## 2022-10-21 PROCEDURE — 82805 BLOOD GASES W/O2 SATURATION: CPT | Performed by: INTERNAL MEDICINE

## 2022-10-21 PROCEDURE — 84100 ASSAY OF PHOSPHORUS: CPT | Performed by: EMERGENCY MEDICINE

## 2022-10-21 PROCEDURE — 99222 1ST HOSP IP/OBS MODERATE 55: CPT | Performed by: INTERNAL MEDICINE

## 2022-10-21 PROCEDURE — 82010 KETONE BODYS QUAN: CPT | Performed by: EMERGENCY MEDICINE

## 2022-10-21 PROCEDURE — 83735 ASSAY OF MAGNESIUM: CPT | Performed by: EMERGENCY MEDICINE

## 2022-10-21 PROCEDURE — 82805 BLOOD GASES W/O2 SATURATION: CPT | Performed by: EMERGENCY MEDICINE

## 2022-10-21 PROCEDURE — 76705 ECHO EXAM OF ABDOMEN: CPT

## 2022-10-21 PROCEDURE — 36415 COLL VENOUS BLD VENIPUNCTURE: CPT

## 2022-10-21 RX ORDER — ENOXAPARIN SODIUM 100 MG/ML
40 INJECTION SUBCUTANEOUS EVERY 24 HOURS
Status: DISCONTINUED | OUTPATIENT
Start: 2022-10-21 | End: 2022-10-22 | Stop reason: HOSPADM

## 2022-10-21 RX ORDER — AMLODIPINE BESYLATE 5 MG/1
5 TABLET ORAL DAILY
Status: DISCONTINUED | OUTPATIENT
Start: 2022-10-21 | End: 2022-10-22 | Stop reason: HOSPADM

## 2022-10-21 RX ORDER — DEXTROSE, SODIUM CHLORIDE, AND POTASSIUM CHLORIDE 5; .9; .15 G/100ML; G/100ML; G/100ML
150 INJECTION INTRAVENOUS CONTINUOUS
Status: DISCONTINUED | OUTPATIENT
Start: 2022-10-21 | End: 2022-10-22

## 2022-10-21 RX ORDER — DEXTROSE AND SODIUM CHLORIDE 5; .9 G/100ML; G/100ML
150 INJECTION, SOLUTION INTRAVENOUS CONTINUOUS
Status: DISCONTINUED | OUTPATIENT
Start: 2022-10-21 | End: 2022-10-21

## 2022-10-21 RX ORDER — ONDANSETRON 2 MG/ML
4 INJECTION INTRAMUSCULAR; INTRAVENOUS EVERY 6 HOURS PRN
Status: DISCONTINUED | OUTPATIENT
Start: 2022-10-21 | End: 2022-10-22 | Stop reason: HOSPADM

## 2022-10-21 RX ORDER — ASPIRIN 81 MG/1
81 TABLET ORAL DAILY
Refills: 1 | Status: DISCONTINUED | OUTPATIENT
Start: 2022-10-21 | End: 2022-10-22 | Stop reason: HOSPADM

## 2022-10-21 RX ORDER — LISINOPRIL 20 MG/1
40 TABLET ORAL DAILY
Status: DISCONTINUED | OUTPATIENT
Start: 2022-10-21 | End: 2022-10-22 | Stop reason: HOSPADM

## 2022-10-21 RX ORDER — LANOLIN ALCOHOL/MO/W.PET/CERES
400 CREAM (GRAM) TOPICAL DAILY
Status: DISCONTINUED | OUTPATIENT
Start: 2022-10-21 | End: 2022-10-22 | Stop reason: HOSPADM

## 2022-10-21 RX ORDER — PANTOPRAZOLE SODIUM 20 MG/1
20 TABLET, DELAYED RELEASE ORAL DAILY
Status: DISCONTINUED | OUTPATIENT
Start: 2022-10-21 | End: 2022-10-22 | Stop reason: HOSPADM

## 2022-10-21 RX ORDER — ATORVASTATIN CALCIUM 10 MG/1
10 TABLET, FILM COATED ORAL DAILY
Status: DISCONTINUED | OUTPATIENT
Start: 2022-10-21 | End: 2022-10-22 | Stop reason: HOSPADM

## 2022-10-21 RX ADMIN — SODIUM CHLORIDE, SODIUM LACTATE, POTASSIUM CHLORIDE, AND CALCIUM CHLORIDE 1000 ML: .6; .31; .03; .02 INJECTION, SOLUTION INTRAVENOUS at 07:32

## 2022-10-21 RX ADMIN — SODIUM CHLORIDE 0.5 UNITS/HR: 9 INJECTION, SOLUTION INTRAVENOUS at 09:36

## 2022-10-21 RX ADMIN — ENOXAPARIN SODIUM 40 MG: 40 INJECTION SUBCUTANEOUS at 17:51

## 2022-10-21 RX ADMIN — SODIUM CHLORIDE 2 UNITS/HR: 9 INJECTION, SOLUTION INTRAVENOUS at 13:07

## 2022-10-21 RX ADMIN — DEXTROSE, SODIUM CHLORIDE, AND POTASSIUM CHLORIDE 150 ML/HR: 5; .9; .15 INJECTION INTRAVENOUS at 20:35

## 2022-10-21 RX ADMIN — IOHEXOL 100 ML: 350 INJECTION, SOLUTION INTRAVENOUS at 08:48

## 2022-10-21 RX ADMIN — DEXTROSE AND SODIUM CHLORIDE 150 ML/HR: 5; .9 INJECTION, SOLUTION INTRAVENOUS at 09:40

## 2022-10-21 NOTE — ED NOTES
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  She is doing well with good oxygen sats, no chest pain, dyspnea or palpitations.

## 2022-10-21 NOTE — H&P
[facility] [facility] 1965, 61 y o  female MRN: [id number]  Unit/Bed#: 415-01 Encounter: [id number]  Primary Care Provider: Roosevelt Terrazas PA-C   Date and time admitted to hospital: 10/21/2022  6:55 AM    * HHNC (hyperglycemic hyperosmolar nonketotic coma) [facility])  Assessment & Plan  Lab Results   Component Value Date    HGBA1C 7 3 (A) 09/16/2022       Recent Labs     10/21/22  0904 10/21/22  1044 10/21/22  1205 10/21/22  1300   POCGLU 124 129 121 191*       Blood Sugar Average: Last 72 hrs:  (P) 141 25     9/16/2022 A1c: 7 3  See A&P for Abdominal Pain    Abdominal pain, colicky  Assessment & Plan  4 weeks of intermittent upper abdominal pain, waxing and waning, aching pain with bloating relieved with belching  The pain is epigastric and RUQ, radiating to the LUQ  Associated with nonbloody diarrhea over the past several weeks and fatigue    · Patient has been taking Metformin 1000mg BID w/o any symptoms  · Began taking Ozempic 4 months prior until 5 weeks ago, discontinued due to epigastric pain and bloating sensation  · At that time, Jardiance was increased from 10mg to 25mg  · Prior episode of epigastric pain 2/2021, diagnosed as gastritis, resolved  · The patient has been following a low carb diet   · UA was positive for glucose, ketones  · ABG CO2 25 7, HCO3 14 8, pH 7 378  · Glu 163, Beta Hydroxybutyrate 5 0, AG 18  · WBC 10 44, Hgb/HCT 16 4/49 2  · CT abd and pelvis: no acute pathology     · Hep panel, CK, procalcitonin, lactic acid pending  · Full stool analysis for bacterial, viral causes pending   · D/c Jardiance, on Insulin sliding scale while hospitalized  · Will continue with D5 IV and Insulin IV   · Continue home Pantoprazole 20mg   · Continue to trend POCT glu and Anion gap closure  · Full diet, monitor for tolerance   · Monitor CBC, CMP, vitals     Diarrhea  Assessment & Plan  4 weeks of intermittent upper abdominal pain, waxing and waning, aching pain with bloating relieved with belching  The pain is epigastric and RUQ, radiating to the LUQ  Associated with nonbloody diarrhea over the past several weeks and fatigue    · H/o fatty liver w/ hepatomegaly, diverticulosis  · Patient has been taking Metformin 1000mg BID w/o any symptoms  · Began taking Ozempic 4 months prior until 5 weeks ago, discontinued due to epigastric pain and bloating sensation  · Prior episode of epigastric pain 2/2021, diagnosed as gastritis, resolved  · The patient has been following a low carb diet   · ABG CO2 25 7, HCO3 14 8, pH 7 378  · WBC 10 44, Hgb/HCT 16 4/49 2  · CT abd and pelvis: no acute pathology  · Full diet, monitor for tolerance   · Full stool analysis for bacterial, viral causes pending   · Monitor CBC, CMP, vitals       Myelolipoma of right adrenal gland  Assessment & Plan  · CT Abd Pelvis: ADRENAL GLANDS:  Unchanged 2 4 x 2 2 cm fat density nodule in the right adrenal gland compatible with myelolipoma  · Patient presenting with RUQ  · CBC w/o signs of anemia  · Na 138, K 3 7, Glu 163  · Will continue to monitor labs for derangements while evaluating cause of anion gap metabolic acidosis  Elevated hemoglobin (HCC)  Assessment & Plan  · Hgb/HCT 16 4/49 2  · Elevated in February as well  · No diagnosis noted  · Will continue to monitor     Fatty liver  Assessment & Plan  · Seen on RUQ U/S 7/2021 w hepatomegaly  · Patient experiencing RUQ  · Transaminases WNL   · Hep panel, CK, procalcitonin, lactic acid pending    Hepatomegaly  Assessment & Plan  See A&P for Fatty Liver    VTE Prophylaxis: Enoxaparin (Lovenox)  / sequential compression device   Code Status: Full    Anticipated Length of Stay:  Patient will be admitted on an Inpatient basis with an anticipated length of stay of  about 2 midnights  Justification for Hospital Stay: workup and treatment of [id number]    Total Time for Visit, including Counseling / Coordination of Care: 45 minutes    Greater than 50% of this total time spent on direct patient counseling and coordination of care  Chief Complaint:   Intermittent abdominal pain and diarrhea    History of Present Illness:    Álvaro Arshad is a 61 y o  female with a PMH significant for T2DM, HTN, high cholesterol, fatty liver w/ hepatomegaly, who presents with 4 weeks of intermittent upper abdominal pain with diarrhea  The patient describes the pain as waxing and waning, aching pain with bloating relieved with belching  The pain is epigastric and RUQ, radiating to the LUQ  Associated with nonbloody diarrhea over the past several weeks and fatigue  Of note, she had been taking Ozempic beginning 4 months prior until 5 weeks ago, when it was discontinued due to epigastric pain and bloating sensation  At that time, Michelle Almendarez was increased from 10mg to 25mg  Also of note, the patient had a prior episode of epigastric pain several months ago, diagnosed as gastritis, resolved with treatment  The patient has been following a low carb diet  Denies chest pain, SOB, fever, chills, N/V, sick contacts  In the ED the patient is tachycardic with elevated BP  In the ED, UA was positive for glucose, ketones, VBG CO2 30 2, HCO3 15 5, O2 56 9, Glu 163, Beta Hydroxybutyrate 5 0, AG 18, WBC 10 44, Hgb/HCT 16 4/49 2  CT abd and pelvis: no acute pathology  LR bolus X1, 1/2 D5 1/2 NS infusion, Insulin infusion     Admitted to the Med Surg team for further evaluation and treatment of abdominal pain and diarrhea with elevated anion gap, decreased CO2  Review of Systems:    Review of Systems   Constitutional: Positive for fatigue  Negative for activity change, chills, diaphoresis and fever  HENT: Negative for congestion, ear pain, rhinorrhea, sinus pressure, sinus pain, sneezing, sore throat and trouble swallowing  Eyes: Negative for pain and visual disturbance  Respiratory: Negative for cough, choking, chest tightness and shortness of breath      Cardiovascular: Negative for chest pain and palpitations  Gastrointestinal: Positive for abdominal distention, abdominal pain and diarrhea  Negative for blood in stool, constipation, nausea and vomiting  Genitourinary: Negative for difficulty urinating, dysuria, flank pain, hematuria and pelvic pain  Musculoskeletal: Negative for arthralgias, back pain, myalgias and neck pain  Skin: Negative for color change and rash  Neurological: Negative for dizziness, seizures, syncope, weakness, light-headedness, numbness and headaches  Psychiatric/Behavioral: Negative for agitation, confusion and suicidal ideas  All other systems reviewed and are negative  Past Medical and Surgical History:     Past Medical History:   Diagnosis Date   • Diabetes mellitus ([facility] [address] )    • High cholesterol    • Hypertension        Past Surgical History:   Procedure Laterality Date   • HYSTERECTOMY         Meds/Allergies:    Prior to Admission medications    Medication Sig Start Date End Date Taking?  Authorizing Provider   Adult Aspirin Regimen 81 MG EC tablet TAKE ONE TABLET BY MOUTH EVERY DAY 9/7/22  Yes Ana Laura Olson PA-C   amLODIPine (NORVASC) 5 mg tablet TAKE ONE TABLET BY MOUTH EVERY DAY 7/28/22  Yes Moraima Estes PA-C   atorvastatin (LIPITOR) 10 mg tablet TAKE ONE TABLET BY MOUTH EVERY DAY 7/28/22  Yes Moraima Estes PA-C   Empagliflozin (Jardiance) 25 MG TABS Take 1 tablet (25 mg total) by mouth in the morning 9/16/22  Yes Moraima Estes PA-C   folic acid (FOLVITE) 860 MCG tablet Take 0 5 tablets (400 mcg total) by mouth daily 3/18/22  Yes Moraima Estes PA-C   glucose blood (Accu-Chek Guide) test strip Test twice daily for fluctuating blood sugars 10/10/22  Yes Moraima Esets PA-C   lisinopril (ZESTRIL) 40 mg tablet TAKE ONE TABLET BY MOUTH EVERY DAY 7/28/22  Yes Moraima Estes PA-C   metFORMIN (GLUCOPHAGE) 1000 MG tablet TAKE ONE TABLET BY MOUTH TWICE A DAY WITH MEALS 9/12/22  Yes Moraima Estes PA-C   pantoprazole (PROTONIX) 20 mg tablet TAKE ONE TABLET BY MOUTH EVERY DAY 9/12/22  Yes Kylah Sebastian PA-C     I have reviewed home medications using allscripts  Allergies: Allergies   Allergen Reactions   • Ozempic (0 25 Or 0 5 Mg-Dose) [Semaglutide(0 25 Or 0 5mg-Dos)] GI Intolerance       Social History:     Marital Status: /Civil Union   Occupation: Employed by [employer]  Patient Pre-hospital Living Situation: Spouse   Patient Pre-hospital Level of Mobility: Full   Patient Pre-hospital Diet Restrictions: None  Substance Use History:   Social History     Substance and Sexual Activity   Alcohol Use Never     Social History     Tobacco Use   Smoking Status Former Smoker   Smokeless Tobacco Never Used     Social History     Substance and Sexual Activity   Drug Use Never       Family History:    Family History   Problem Relation Age of Onset   • Diabetes Mother    • Diabetes Father        Physical Exam:     Vitals:   Blood Pressure: 146/87 (10/21/22 1537)  Pulse: 91 (10/21/22 1537)  Temperature: 98 °F (36 7 °C) (10/21/22 1312)  Temp Source: Temporal (10/21/22 0657)  Respirations: 20 (10/21/22 1311)  Height: 5' 11" (180 3 cm) (10/21/22 1312)  Weight - Scale: 93 2 kg (205 lb 7 5 oz) (10/21/22 1312)  SpO2: 96 % (10/21/22 1537)    Physical Exam  Vitals and nursing note reviewed  Constitutional:       General: She is not in acute distress  Appearance: Normal appearance  She is well-developed  She is not ill-appearing  HENT:      Head: Normocephalic and atraumatic  Right Ear: External ear normal       Left Ear: External ear normal       Nose: Nose normal  No congestion or rhinorrhea  Mouth/Throat:      Mouth: Mucous membranes are moist       Pharynx: Oropharynx is clear  Eyes:      General: No scleral icterus  Extraocular Movements: Extraocular movements intact  Conjunctiva/sclera: Conjunctivae normal    Cardiovascular:      Rate and Rhythm: Regular rhythm  Tachycardia present  Pulses: Normal pulses  Heart sounds: Normal heart sounds  No murmur heard  Pulmonary:      Effort: Pulmonary effort is normal  No respiratory distress  Breath sounds: Normal breath sounds  Abdominal:      General: Bowel sounds are normal  There is distension  Palpations: Abdomen is soft  There is no mass  Tenderness: There is abdominal tenderness  There is no right CVA tenderness, left CVA tenderness, guarding or rebound  Musculoskeletal:         General: No swelling or tenderness  Normal range of motion  Cervical back: Normal range of motion and neck supple  No rigidity  Right lower leg: No edema  Left lower leg: No edema  Skin:     General: Skin is warm and dry  Coloration: Skin is not jaundiced or pale  Findings: No erythema or rash  Neurological:      General: No focal deficit present  Mental Status: She is alert and oriented to person, place, and time  Psychiatric:         Mood and Affect: Mood normal          Behavior: Behavior normal          Additional Data:     Lab Results: I have personally reviewed pertinent reports        Results from last 7 days   Lab Units 10/21/22  0717   WBC Thousand/uL 10 44*   HEMOGLOBIN g/dL 16 4*   HEMATOCRIT % 49 2*   PLATELETS Thousands/uL 325   NEUTROS PCT % 68   LYMPHS PCT % 22   MONOS PCT % 8   EOS PCT % 1     Results from last 7 days   Lab Units 10/21/22  1052 10/21/22  0717   SODIUM mmol/L 139 138   POTASSIUM mmol/L 4 2 3 7   CHLORIDE mmol/L 103 101   CO2 mmol/L 18* 19*   BUN mg/dL 17 20   CREATININE mg/dL 0 61 0 68   ANION GAP mmol/L 18* 18*   CALCIUM mg/dL 9 2 10 0   ALBUMIN g/dL  --  4 7   TOTAL BILIRUBIN mg/dL  --  0 43   ALK PHOS U/L  --  68   ALT U/L  --  31   AST U/L  --  16   GLUCOSE RANDOM mg/dL 135 163*         Results from last 7 days   Lab Units 10/21/22  1707 10/21/22  1502 10/21/22  1300 10/21/22  1205 10/21/22  1044 10/21/22  0904   POC GLUCOSE mg/dl 104 111 191* 121 129 124         Results from last 7 days   Lab Units 10/21/22  0732   LACTIC ACID mmol/L 1 4 Imaging: I have personally reviewed pertinent reports  US right upper quadrant   Final Result by Ruth Asher DO (10/21 0912)   1  Diffusely hyperechoic appearance of the liver parenchyma which is most compatible with steatosis  2   No choleliths are identified  Workstation performed: [id number]         CT abdomen pelvis with contrast   Final Result by Bret Gaytan MD (10/21 16)      No acute pathology  Colonic diverticulosis without diverticulitis  Hepatomegaly with fatty infiltration  Workstation performed: [id number]             EKG, Pathology, and Other Studies Reviewed on Admission:   EKG: Normal sinus rhythm  Low voltage QRS  Borderline ECG  When compared with ECG of 10-FEB-2022 11:39,  · No significant change was found    Allscripts / Epic Records Reviewed: Yes     ** Please Note: This note has been constructed using a voice recognition system   **

## 2022-10-21 NOTE — PLAN OF CARE
Problem: PAIN - ADULT  Goal: Verbalizes/displays adequate comfort level or baseline comfort level  Description: Interventions:  - Encourage patient to monitor pain and request assistance  - Assess pain using appropriate pain scale(0-10)  - Administer analgesics based on type and severity of pain and evaluate response  - Implement non-pharmacological measures as appropriate and evaluate response  - Consider cultural and social influences on pain and pain management  - Notify physician/advanced practitioner if interventions unsuccessful or patient reports new pain  Outcome: Progressing     Problem: INFECTION - ADULT  Goal: Absence or prevention of progression during hospitalization  Description: INTERVENTIONS:  - Assess and monitor for signs and symptoms of infection  - Monitor lab/diagnostic results  - Monitor all insertion sites, i e  indwelling lines  - Administer medications as ordered  - Instruct and encourage patient and family to use good hand hygiene technique    Outcome: Progressing     Problem: SAFETY ADULT  Goal: Patient will remain free of falls  Description: INTERVENTIONS:  - Educate patient/family on patient safety including physical limitations  - Instruct patient to call for assistance with activity   - Consult OT/PT to assist with strengthening/mobility   - Keep Call bell within reach  - Keep bed low and locked with side rails adjusted as appropriate  - Keep care items and personal belongings within reach  - Initiate and maintain comfort rounds  - Make Fall Risk Sign visible to staff  - Offer Toileting every 2 Hours, in advance of need  - Initiate/Maintain bed/chair alarm  - Obtain necessary fall risk management equipment: nonskid socks, call bell in reach   - Apply yellow socks and bracelet for high fall risk patients  - Consider moving patient to room near nurses station  Outcome: Progressing  Goal: Maintain or return to baseline ADL function  Description: INTERVENTIONS:  -  Assess patient's ability to carry out ADLs(independent)  - Assess/evaluate cause of self-care deficits (fatigue)  - Assess range of motion  - Assess patient's mobility; develop plan if impaired  - Assess patient's need for assistive devices and provide as appropriate  - Encourage maximum independence but intervene and supervise when necessary  - Involve family in performance of ADLs  - Assess for home care needs following discharge   - Consider OT consult to assist with ADL evaluation and planning for discharge  - Provide patient education as appropriate  Outcome: Progressing  Goal: Maintains/Returns to pre admission functional level  Description: INTERVENTIONS:  - Perform BMAT or MOVE assessment daily    - Set and communicate daily mobility goal to care team and patient/family/caregiver     - Collaborate with rehabilitation services on mobility goals if consulted  - Stand patient 3 times a day  - Ambulate patient 3 times a day  - Out of bed to chair 3 times a day   - Out of bed for meals 3 times a day  - Out of bed for toileting  - Record patient progress and toleration of activity level   Outcome: Progressing     Problem: DISCHARGE PLANNING  Goal: Discharge to home or other facility with appropriate resources  Description: INTERVENTIONS:  - Identify barriers to discharge w/patient and caregiver  - Arrange for needed discharge resources and transportation as appropriate  - Identify discharge learning needs (meds, wound care, etc )  - Refer to Case Management Department for coordinating discharge planning if the patient needs post-hospital services based on physician/advanced practitioner order or complex needs related to functional status, cognitive ability, or social support system  Outcome: Progressing     Problem: Knowledge Deficit  Goal: Patient/family/caregiver demonstrates understanding of disease process, treatment plan, medications, and discharge instructions  Description: Complete learning assessment and assess knowledge base   Interventions:  - Provide teaching at level of understanding  - Provide teaching via preferred learning methods  Outcome: Progressing     Problem: GASTROINTESTINAL - ADULT  Goal: Minimal or absence of nausea and/or vomiting  Description: INTERVENTIONS:  - Administer IV fluids if ordered to ensure adequate hydration  - Maintain NPO status until nausea and vomiting are resolved  - Nasogastric tube if ordered  - Administer ordered antiemetic medications as needed  - Provide nonpharmacologic comfort measures as appropriate  - Advance diet as tolerated, if ordered  - Consider nutrition services referral to assist patient with adequate nutrition and appropriate food choices  Outcome: Progressing  Goal: Maintains or returns to baseline bowel function  Description: INTERVENTIONS:  - Assess bowel function  - Encourage oral fluids to ensure adequate hydration  - Administer IV fluids if ordered to ensure adequate hydration  - Administer ordered medications as needed  - Encourage mobilization and activity  - Consider nutritional services referral to assist patient with adequate nutrition and appropriate food choices  Outcome: Progressing  Goal: Maintains adequate nutritional intake  Description: INTERVENTIONS:  - Monitor percentage of each meal consumed  - Identify factors contributing to decreased intake, treat as appropriate  - Assist with meals as needed  - Monitor I&O, weight, and lab values if indicated  - Obtain nutrition services referral as needed  Outcome: Progressing  Goal: Establish and maintain optimal ostomy function  Description: INTERVENTIONS:  - Assess bowel function  - Encourage oral fluids to ensure adequate hydration  - Administer IV fluids if ordered to ensure adequate hydration   - Administer ordered medications as needed  - Encourage mobilization and activity  - Nutrition services referral to assist patient with appropriate food choices  - Assess stoma site  - Consider wound care consult   Outcome: Progressing  Goal: Oral mucous membranes remain intact  Description: INTERVENTIONS  - Assess oral mucosa and hygiene practices  - Implement preventative oral hygiene regimen  - Implement oral medicated treatments as ordered  - Initiate Nutrition services referral as needed  Outcome: Progressing     Problem: METABOLIC, FLUID AND ELECTROLYTES - ADULT  Goal: Electrolytes maintained within normal limits  Description: INTERVENTIONS:  - Monitor labs and assess patient for signs and symptoms of electrolyte imbalances  - Administer electrolyte replacement as ordered  - Monitor response to electrolyte replacements, including repeat lab results as appropriate  - Instruct patient on fluid and nutrition as appropriate  Outcome: Progressing  Goal: Fluid balance maintained  Description: INTERVENTIONS:  - Monitor labs   - Monitor I/O and WT  - Instruct patient on fluid and nutrition as appropriate  - Assess for signs & symptoms of volume excess or deficit  Outcome: Progressing  Goal: Glucose maintained within target range  Description: INTERVENTIONS:  - Monitor Blood Glucose as ordered  - Assess for signs and symptoms of hyperglycemia and hypoglycemia  - Administer ordered medications to maintain glucose within target range  - Assess nutritional intake and initiate nutrition service referral as needed  Outcome: Progressing

## 2022-10-21 NOTE — ED NOTES
No changes made to insulin infusion at this time per [facility] of [address], [id number] [facility]  10/21/22 3866

## 2022-10-21 NOTE — ED PROVIDER NOTES
History  Chief Complaint   Patient presents with   • Abdominal Pain     Pt c/o upper abdominal pain intermittently over the past month  Pt denies n/v but states she has been having episodes of diarrhea described as "piled cow shit"       History provided by:  Medical records and patient  Abdominal Pain  Pain location:  Epigastric and RUQ  Pain quality: aching and bloating    Pain radiates to:  LUQ  Pain severity:  Moderate  Onset quality:  Gradual  Duration:  4 weeks  Timing:  Intermittent  Progression:  Waxing and waning  Chronicity: Had episode of epigastric rather than RUQ abd pain several months prior diagnosed as gastritis that resolved with treatment  Context: diet changes (patient has been following a low-carb diet for the past several months for weight loss) and previous surgery (hysterectomy)    Context: not awakening from sleep, not recent illness, not recent travel and not sick contacts    Context comment:  Had EGD about 15 yr prior that was normal; does not recall indication  Relieved by:  Nothing  Worsened by:  Belching (When pain is present, belching relieves sx)  Ineffective treatments:  None tried  Associated symptoms: diarrhea (Nonbloody loose stools over past several weeks) and fatigue    Associated symptoms: no chest pain, no chills, no cough, no dysuria, no fever, no nausea, no shortness of breath and no vomiting    Risk factors: obesity    Risk factors: no alcohol abuse and has not had multiple surgeries    Risk factors comment:  Patient had been taking Ozempic from about 4 months prior until 5 wk ago; medication was discontinued after PMD visit on 16 Sept d/t epigastric bloating sensation  Julius Buddy was increased at that point from 10mg to 25 mg     DDx including but not limited to: gastroenteritis, gastritis, PUD, GERD, hepatitis, pancreatitis, IBS, ileus, cholecystitis, biliary colic  Cbc/cmp/lipase/mag/lactic acid   Ct a/p with IV contrast  Patient essentially asymptomatic at time of my exam; did not require any analgesia or antiemetics  Disposition pending results of workup  Prior to Admission Medications   Prescriptions Last Dose Informant Patient Reported? Taking? Adult Aspirin Regimen 81 MG EC tablet 10/21/2022 at Unknown time  No Yes   Sig: TAKE ONE TABLET BY MOUTH EVERY DAY   Empagliflozin (Jardiance) 25 MG TABS 10/21/2022 at Unknown time  No Yes   Sig: Take 1 tablet (25 mg total) by mouth in the morning   amLODIPine (NORVASC) 5 mg tablet 10/21/2022 at Unknown time  No Yes   Sig: TAKE ONE TABLET BY MOUTH EVERY DAY   atorvastatin (LIPITOR) 10 mg tablet 10/21/2022 at Unknown time  No Yes   Sig: TAKE ONE TABLET BY MOUTH EVERY DAY   folic acid (FOLVITE) 529 MCG tablet 10/21/2022 at Unknown time  No Yes   Sig: Take 0 5 tablets (400 mcg total) by mouth daily   glucose blood (Accu-Chek Guide) test strip 10/21/2022 at Unknown time  No Yes   Sig: Test twice daily for fluctuating blood sugars   lisinopril (ZESTRIL) 40 mg tablet 10/21/2022 at Unknown time  No Yes   Sig: TAKE ONE TABLET BY MOUTH EVERY DAY   metFORMIN (GLUCOPHAGE) 1000 MG tablet 10/21/2022 at Unknown time  No Yes   Sig: TAKE ONE TABLET BY MOUTH TWICE A DAY WITH MEALS   pantoprazole (PROTONIX) 20 mg tablet 10/21/2022 at Unknown time  No Yes   Sig: TAKE ONE TABLET BY MOUTH EVERY DAY      Facility-Administered Medications: None       Past Medical History:   Diagnosis Date   • Diabetes mellitus ([facility] [address] )    • High cholesterol    • Hypertension        Past Surgical History:   Procedure Laterality Date   • HYSTERECTOMY         Family History   Problem Relation Age of Onset   • Diabetes Mother    • Diabetes Father      I have reviewed and agree with the history as documented      E-Cigarette/Vaping   • E-Cigarette Use Never User      E-Cigarette/Vaping Substances     Social History     Tobacco Use   • Smoking status: Former Smoker   • Smokeless tobacco: Never Used   Vaping Use   • Vaping Use: Never used   Substance Use Topics   • Alcohol use: Never   • Drug use: Never       Review of Systems   Constitutional: Positive for fatigue  Negative for chills and fever  Respiratory: Negative for cough and shortness of breath  Cardiovascular: Negative for chest pain and palpitations  Gastrointestinal: Positive for abdominal distention, abdominal pain and diarrhea (Nonbloody loose stools over past several weeks)  Negative for blood in stool, nausea and vomiting  Genitourinary: Negative for difficulty urinating, dysuria and flank pain  Neurological: Negative for dizziness, weakness, numbness and headaches  Hematological: Negative for adenopathy  Does not bruise/bleed easily  All other systems reviewed and are negative  Physical Exam  Physical Exam  Vitals and nursing note reviewed  Constitutional:       General: She is awake  She is not in acute distress  Appearance: Normal appearance  She is well-developed  HENT:      Head: Normocephalic and atraumatic  Right Ear: Hearing and external ear normal       Left Ear: Hearing and external ear normal    Neck:      Trachea: Trachea and phonation normal    Cardiovascular:      Rate and Rhythm: Regular rhythm  Tachycardia present  Pulses:           Radial pulses are 2+ on the right side and 2+ on the left side  Dorsalis pedis pulses are 2+ on the right side and 2+ on the left side  Posterior tibial pulses are 2+ on the right side and 2+ on the left side  Heart sounds: Normal heart sounds, S1 normal and S2 normal  No murmur heard  No friction rub  No gallop  Pulmonary:      Effort: Pulmonary effort is normal  No respiratory distress  Breath sounds: Normal breath sounds  No stridor  No decreased breath sounds, wheezing, rhonchi or rales  Abdominal:      Tenderness: There is abdominal tenderness in the right upper quadrant and epigastric area  There is no right CVA tenderness, left CVA tenderness, guarding or rebound   Negative signs include Paredes's sign and McBurney's sign  Skin:     General: Skin is warm and dry  Neurological:      Mental Status: She is alert and oriented to person, place, and time  GCS: GCS eye subscore is 4  GCS verbal subscore is 5  GCS motor subscore is 6  Cranial Nerves: No cranial nerve deficit  Sensory: No sensory deficit  Motor: No abnormal muscle tone  Comments: PERRLA; EOMI  Sensation intact to light touch over face in V1-V3 distribution bilaterally  Facial expressions symmetric  Tongue/uvula midline  Shoulder shrug equal bilaterally  Strength 5/5 in UE/LE bilaterally  Sensation intact to light touch in UE/LE bilaterally           Vital Signs  ED Triage Vitals   Temperature Pulse Respirations Blood Pressure SpO2   10/21/22 0657 10/21/22 0658 10/21/22 0657 10/21/22 0658 10/21/22 0658   98 3 °F (36 8 °C) (!) 107 16 (!) 179/82 100 %      Temp Source Heart Rate Source Patient Position - Orthostatic VS BP Location FiO2 (%)   10/21/22 0657 10/21/22 0658 10/21/22 0658 10/21/22 0658 --   Temporal Monitor Sitting Left arm       Pain Score       10/21/22 0657       No Pain           Vitals:    10/21/22 0658 10/21/22 0730 10/21/22 0915 10/21/22 0930   BP: (!) 179/82 158/78 150/94 150/78   Pulse: (!) 107 (!) 107 (!) 108 (!) 108   Patient Position - Orthostatic VS: Sitting Lying Sitting Lying         Visual Acuity      ED Medications  Medications   dextrose 5 % and sodium chloride 0 9 % infusion (150 mL/hr Intravenous New Bag 10/21/22 0940)   lactated ringers bolus 1,000 mL (has no administration in time range)   insulin regular (HumuLIN R,NovoLIN R) 1 Units/mL in sodium chloride 0 9 % 100 mL infusion (0 5 Units/hr Intravenous New Bag 10/21/22 0936)   lactated ringers bolus 1,000 mL (0 mL Intravenous Stopped 10/21/22 0939)   iohexol (OMNIPAQUE) 350 MG/ML injection (SINGLE-DOSE) 100 mL (100 mL Intravenous Given 10/21/22 0848)       Diagnostic Studies  Results Reviewed     Procedure Component Value Units Date/Time    COVID19, Influenza A/B, RSV PCR, [facility] [[id number]]  (Normal) Collected: 10/21/22 0938    Lab Status: Final result Specimen: Nasopharyngeal Swab Updated: 10/21/22 1023     SARS-CoV-2 Negative     INFLUENZA A PCR Negative     INFLUENZA B PCR Negative     RSV PCR Negative    Narrative:      FOR PEDIATRIC PATIENTS - copy/paste COVID Guidelines URL to browser: [email or web address]  [employer]    SARS-CoV-2 assay is a Nucleic Acid Amplification assay intended for the  qualitative detection of nucleic acid from SARS-CoV-2 in nasopharyngeal  swabs  Results are for the presumptive identification of SARS-CoV-2 RNA  Positive results are indicative of infection with SARS-CoV-2, the virus  causing COVID-19, but do not rule out bacterial infection or co-infection  with other viruses  Laboratories within the [place] and its  territories are required to report all positive results to the appropriate  public health authorities  Negative results do not preclude SARS-CoV-2  infection and should not be used as the sole basis for treatment or other  patient management decisions  Negative results must be combined with  clinical observations, patient history, and epidemiological information  This test has not been FDA cleared or approved  This test has been authorized by FDA under an Emergency Use Authorization  (EUA)  This test is only authorized for the duration of time the  declaration that circumstances exist justifying the authorization of the  emergency use of an in vitro diagnostic tests for detection of SARS-CoV-2  virus and/or diagnosis of COVID-19 infection under section 564(b)(1) of  the Act, 21 U  S C  544OEU-4(C)(8), unless the authorization is terminated  or revoked sooner  The test has been validated but independent review by FDA  and CLIA is pending  Test performed using [employer]: This RT-PCR assay targets N2,  a region unique to SARS-CoV-2   A conserved region in the E-gene was chosen  for pan-Sarbecovirus detection which includes SARS-CoV-2  According to CMS-2020-01-R, this platform meets the definition of high-throughput technology  Urine culture [[id number]] Collected: 10/21/22 0826    Lab Status: In process Specimen: Urine, Clean Catch Updated: 10/21/22 8132    Basic metabolic panel [[id number]]     Lab Status: No result Specimen: Blood     Fingerstick Glucose (POCT) [[id number]]  (Normal) Collected: 10/21/22 0904    Lab Status: Final result Updated: 10/21/22 0906     POC Glucose 124 mg/dl     Phosphorus [[id number]]  (Normal) Collected: 10/21/22 0732    Lab Status: Final result Specimen: Blood from Arm, Right Updated: 10/21/22 0856     Phosphorus 4 2 mg/dL     Urine Microscopic [[id number]]  (Normal) Collected: 10/21/22 0826    Lab Status: Final result Specimen: Urine, Clean Catch Updated: 10/21/22 0847     RBC, UA None Seen /hpf      WBC, UA None Seen /hpf      Epithelial Cells Occasional /hpf      Bacteria, UA None Seen /hpf     UA w Reflex to Microscopic w Reflex to Culture [[id number]]  (Abnormal) Collected: 10/21/22 0826    Lab Status: Final result Specimen: Urine, Clean Catch Updated: 10/21/22 0833     Color, UA Light Yellow     Clarity, UA Clear     Specific Gravity, UA 1 015     pH, UA 5 5     Leukocytes, UA Elevated glucose may cause decreased leukocyte values   See urine microscopic for [facility] result/     Nitrite, UA Negative     Protein, UA Negative mg/dl      Glucose, UA >=1000 (1%) mg/dl      Ketones, UA >=80 (3+) mg/dl      Urobilinogen, UA 0 2 E U /dl      Bilirubin, UA Negative     Occult Blood, UA Negative    Blood gas, venous [[id number]]  (Abnormal) Collected: 10/21/22 0825    Lab Status: Final result Specimen: Blood from Arm, Right Updated: 10/21/22 0831     pH, Frandy 7 328     pCO2, Frandy 30 2 mm Hg      pO2, Frandy 56 9 mm Hg      HCO3, Frandy 15 5 mmol/L      Base Excess, Frandy -8 9 mmol/L      O2 Content, Frandy 19 7 ml/dL      O2 HGB, VENOUS 86 5 %     High Sensitivity Troponin I Random [[id number]]  (Abnormal) Collected: 10/21/22 0732    Lab Status: Final result Specimen: Blood from Arm, Right Updated: 10/21/22 0809     HS TnI random 6 ng/L     Beta Hydroxybutyrate [[id number]]  (Abnormal) Collected: 10/21/22 0800    Lab Status: Final result Specimen: Blood from Arm, Right Updated: 10/21/22 0808     BETA-HYDROXYBUTYRATE 5 0 mmol/L     Lactic acid, plasma [[id number]]  (Normal) Collected: 10/21/22 0732    Lab Status: Final result Specimen: Blood from Arm, Right Updated: 10/21/22 0804     LACTIC ACID 1 4 mmol/L     Narrative:      Result may be elevated if tourniquet was used during collection      Magnesium [[id number]]  (Normal) Collected: 10/21/22 0732    Lab Status: Final result Specimen: Blood from Arm, Right Updated: 10/21/22 0756     Magnesium 1 9 mg/dL     Lipase [[id number]]  (Normal) Collected: 10/21/22 0717    Lab Status: Final result Specimen: Blood from Arm, Right Updated: 10/21/22 0740     Lipase 156 u/L     Comprehensive metabolic panel [[id number]]  (Abnormal) Collected: 10/21/22 0717    Lab Status: Final result Specimen: Blood from Arm, Right Updated: 10/21/22 0740     Sodium 138 mmol/L      Potassium 3 7 mmol/L      Chloride 101 mmol/L      CO2 19 mmol/L      ANION GAP 18 mmol/L      BUN 20 mg/dL      Creatinine 0 68 mg/dL      Glucose 163 mg/dL      Calcium 10 0 mg/dL      AST 16 U/L      ALT 31 U/L      Alkaline Phosphatase 68 U/L      Total Protein 8 1 g/dL      Albumin 4 7 g/dL      Total Bilirubin 0 43 mg/dL      eGFR 96 ml/min/1 73sq m     Narrative:      [place] guidelines for Chronic Kidney Disease (CKD):   •  Stage 1 with normal or high GFR (GFR > 90 mL/min/1 73 square meters)  •  Stage 2 Mild CKD (GFR = 60-89 mL/min/1 73 square meters)  •  Stage 3A Moderate CKD (GFR = 45-59 mL/min/1 73 square meters)  •  Stage 3B Moderate CKD (GFR = 30-44 mL/min/1 73 square meters)  •  Stage 4 Severe CKD (GFR = 15-29 mL/min/1 73 square meters)  •  Stage 5 End Stage CKD (GFR <15 mL/min/1 73 square meters)  Note: GFR calculation is accurate only with a steady state creatinine    CBC and differential [[id number]]  (Abnormal) Collected: 10/21/22 0717    Lab Status: Final result Specimen: Blood from Arm, Right Updated: 10/21/22 0722     WBC 10 44 Thousand/uL      RBC 5 09 Million/uL      Hemoglobin 16 4 g/dL      Hematocrit 49 2 %      MCV 97 fL      MCH 32 2 pg      MCHC 33 3 g/dL      RDW 12 7 %      MPV 10 9 fL      Platelets 732 Thousands/uL      nRBC 0 /100 WBCs      Neutrophils Relative 68 %      Immat GRANS % 0 %      Lymphocytes Relative 22 %      Monocytes Relative 8 %      Eosinophils Relative 1 %      Basophils Relative 1 %      Neutrophils Absolute 7 13 Thousands/µL      Immature Grans Absolute 0 04 Thousand/uL      Lymphocytes Absolute 2 30 Thousands/µL      Monocytes Absolute 0 83 Thousand/µL      Eosinophils Absolute 0 07 Thousand/µL      Basophils Absolute 0 07 Thousands/µL                  CT abdomen pelvis with contrast   Final Result by Devin Vasquez MD (10/21 9157)      No acute pathology  Colonic diverticulosis without diverticulitis  Hepatomegaly with fatty infiltration              Workstation performed: [id number]         US right upper quadrant    (Results Pending)              Procedures  CriticalCare Time  Performed by: Rosalinda Blakely DO  Authorized by: Rosalinda Blakely DO     Critical care provider statement:     Critical care time (minutes):  45    Critical care was necessary to treat or prevent imminent or life-threatening deterioration of the following conditions:  Endocrine crisis    Critical care was time spent personally by me on the following activities:  Obtaining history from patient or surrogate, discussions with consultants, evaluation of patient's response to treatment, examination of patient, ordering and review of laboratory studies, ordering and review of radiographic studies, re-evaluation of patient's condition, review of old charts and ordering and performing treatments and interventions             ED Course  ED Course as of 10/21/22 1034   Fri Oct 21, 2022   0700 ECG NSR 99 bpm   QRS 82 Qtc 454  No ectopy  No acute st/t changes  No Q waves  Interpreted by me   0733 CBC and differential(!)  WBC mildly elevated  Hg/Hct mildly elevated; comparable to prior  Plt wnl   0753 Lipase  Normal   0753 Comprehensive metabolic panel(!)  Mild elevation in anion gap  Slightly decreased CO2  Otherwise electrolytes normal   Mild hyperglycemia  Transaminases normal   0804 Lactic acid, plasma  Normal   0804 Patient to CT scan now   0812 High Sensitivity Troponin I Random(!)  Nonischemic   0812 Beta Hydroxybutyrate(!)  Significantly elevated   0813 This significant beta hydroxybutyrate elevation in conjunction with elevated anion gap and decreased CO2 are concerning for euglycemic DKA particularly given as patient does take empagliflozin  As she is only minimally hyperglycemic, D5/NS infusion will need to be started in addition to insulin infusion  She will require hospitalization  She is pending CT abdomen pelvis at this point which I will obtain prior to hospitalization  3045 UA w Reflex to Microscopic w Reflex to Culture(!)  Ketonuria and glucosuria present  2260 CT abdomen pelvis with contrast   0903 CT abdomen pelvis with contrast  FINDINGS:     ABDOMEN     LOWER CHEST:  No clinically significant abnormality identified in the visualized lower chest      LIVER/BILIARY TREE:  Unchanged hepatomegaly with fatty infiltration  No CT evidence of suspicious hepatic mass  Normal hepatic contours  No biliary dilatation      GALLBLADDER:  No calcified gallstones   No pericholecystic inflammatory change      SPLEEN:  Unremarkable      PANCREAS:  Unremarkable      ADRENAL GLANDS:  Unchanged 2 4 x 2 2 cm fat density nodule in the right adrenal gland compatible with myelolipoma      KIDNEYS/URETERS: Unremarkable  No hydronephrosis      STOMACH AND BOWEL:  There is colonic diverticulosis without evidence of acute diverticulitis      APPENDIX:  No findings to suggest appendicitis      ABDOMINOPELVIC CAVITY:  No ascites  No pneumoperitoneum  No lymphadenopathy      VESSELS:  Atherosclerotic changes are present  No evidence of aneurysm      PELVIS     REPRODUCTIVE ORGANS:  Surgical changes of prior hysterectomy      URINARY BLADDER:  Unremarkable      ABDOMINAL WALL/INGUINAL REGIONS:  There is a small fat-containing umbilical hernia, unchanged from previous examination      OSSEOUS STRUCTURES:  No acute fracture or destructive osseous lesion      IMPRESSION:     No acute pathology      Colonic diverticulosis without diverticulitis      Hepatomegaly with fatty infiltration  0810 Blood gas, venous(!)  Compensated metabolic acidosis   [address] [place] Text to internal medicine Dr Brayton [address] Dominick Ferns accepted patient in inpatient admission; insulin infusion changed to non-DKA protocol           MDM    Disposition  Final diagnoses:   Metabolic acidosis, increased anion gap   Elevated beta-hydroxybutyrate     Time reflects when diagnosis was documented in both MDM as applicable and the Disposition within this note     Time User Action Codes Description Comment    10/21/2022  9:22 AM Deven Ramos Add [C45 28] Metabolic acidosis, increased anion gap     10/21/2022  9:22 AM Deven Ramos Add [R78 [id number]] Elevated beta-hydroxybutyrate       ED Disposition     ED Disposition   Admit    Condition   Stable    Date/Time   Fri Oct 21, 2022  9:21 AM    Comment   Case was discussed with Dr Ivan Blancas and the patient's admission status was agreed to be Admission Status: inpatient status to the service of Dr Ivan Blancas   Follow-up Information    None         Patient's Medications   Discharge Prescriptions    No medications on file       No discharge procedures on file      PDMP Review     None          ED Provider  Electronically Signed by           Lisa Simental DO  10/21/22 1033

## 2022-10-21 NOTE — CASE MANAGEMENT
Case Management Assessment    Patient name Patricia Oakley  Location /625-97 MRN [id number]  : 1965 Date 10/21/2022       Current Admission Date: 10/21/2022  Current Admission [address] (hyperglycemic hyperosmolar nonketotic coma) [facility])   Patient Active Problem List    Diagnosis Date Noted   • Myelolipoma of right adrenal gland 10/21/2022   • Hepatomegaly 10/21/2022   • Fatty liver 10/21/2022   • Elevated hemoglobin ([facility] [address] ) 10/21/2022   • HHNC (hyperglycemic hyperosmolar nonketotic coma) ([facility] [address] ) 10/21/2022   • Type 2 diabetes mellitus with hyperglycemia, without long-term current use of insulin (HCC)       LOS (days): 0  Geometric Mean LOS (GMLOS) (days):   Days to GMLOS:     OBJECTIVE:    Risk of Unplanned Readmission Score: 8 08         Current admission status: Inpatient       Preferred Pharmacy:   [address] [facility] 79 [facility]  [address] [address]  Phone: [phone] Fax: [phone]    [facility] [address]  Phone: [phone] Fax: [phone]    [address] [address]  Phone: [phone] Fax: [phone]    Primary Care Provider: Mo Kennedy PA-C    Primary Insurance: BLUE CROSS  Secondary Insurance:     ASSESSMENT:  [id number] [place] [address],[facility]  2800, [facility] 2 Representative - Spouse   Primary Phone: [phone] (Mobile)               Advance Directives  Does patient have a [address] [facility] [address]?: No  Was patient offered paperwork?: Yes (declined)  Does patient currently have a Health Care decision maker?: Yes, please see Health Care Proxy section  Does patient have Advance Directives?: No  Was patient offered paperwork?: Yes (declined)  Primary Contact: ricci mcmahon-spouse         Readmission Root Cause  30 Day Readmission: No    Patient Information  Admitted from[de-identified] Home  Mental Status: Alert  During Assessment patient was accompanied by: Spouse  Assessment information provided by[de-identified] Patient  Primary Caregiver: Self  Support Systems: Self, Spouse/significant other  [place] of Residence: [facility] do you live in?: [address] [place], [place] [address]y entry access options   Select all that apply : Stairs  Number of steps to enter home : 2  Do the steps have railings?: No  Type of Current Residence: 2 story home  Upon entering residence, is there a bedroom on the main floor (no further steps)?: No  A bedroom is located on the following floor levels of residence (select all that apply):: 2nd Floor  Upon entering residence, is there a bathroom on the main floor (no further steps)?: No  Indicate which floors of current residence have a bathroom (select all the apply):: 2nd Floor  Number of steps to 2nd floor from main floor: One Flight  In the last 12 months, was there a time when you were not able to pay the mortgage or rent on time?: No  In the last 12 months, how many places have you lived?: 1  In the last 12 months, was there a time when you did not have a steady place to sleep or slept in a shelter (including now)?: No  Homeless/housing insecurity resource given?: N/A  Living Arrangements: Lives w/ Spouse/significant other  Is patient a ?: No    Activities of Daily Living Prior to Admission  Functional Status: Independent  Completes ADLs independently?: Yes  Ambulates independently?: Yes  Does patient use assisted devices?: No  Does patient currently own DME?: No  Does patient have a history of Outpatient Therapy (PT/OT)?: No  Does the patient have a history of Short-Term Rehab?: No  Does patient have a history of HHC?: No  Does patient currently have [address]?: No         Patient Information Continued  Income Source: Employed  Does patient have prescription coverage?: Yes  Within the past 12 months, you worried that your food would run out before you got the money to buy more : Never true  Within the past 12 months, the food you bought just didn't last and you didn't have money to get more : Never true  Food insecurity resource given?: N/A  Does patient receive dialysis treatments?: No  Does patient have a history of substance abuse?: No  Does patient have a history of Mental Health Diagnosis?: No         Means of Transportation  Means of Transport to Appts[de-identified] Drives Self  In the past 12 months, has lack of transportation kept you from medical appointments or from getting medications?: No  In the past 12 months, has lack of transportation kept you from meetings, work, or from getting things needed for daily living?: No  Was application for public transport provided?: N/A  Cm met with the patient to evaluate the patients prior function and living situation and any barriers to d/c and form a safe d/c plan  Cm also evaluated the patient for any services in the home or needs for services  CM also discussed with patient that their preferences will be taken into account/consideration  Pt admitted with abdominal pain and diarrhea  Pt is independent at baseline  No current discharge needs noted  CM to follow

## 2022-10-22 VITALS
OXYGEN SATURATION: 98 % | HEART RATE: 81 BPM | DIASTOLIC BLOOD PRESSURE: 98 MMHG | RESPIRATION RATE: 20 BRPM | WEIGHT: 205.47 LBS | BODY MASS INDEX: 28.77 KG/M2 | HEIGHT: 71 IN | TEMPERATURE: 97.8 F | SYSTOLIC BLOOD PRESSURE: 144 MMHG

## 2022-10-22 PROBLEM — I10 ESSENTIAL HYPERTENSION: Status: ACTIVE | Noted: 2022-10-22

## 2022-10-22 PROBLEM — E11.10 DIABETIC KETOACIDOSIS WITHOUT COMA ASSOCIATED WITH TYPE 2 DIABETES MELLITUS (HCC): Status: ACTIVE | Noted: 2022-10-21

## 2022-10-22 PROBLEM — R77.8 ELEVATED TROPONIN LEVEL: Status: ACTIVE | Noted: 2022-10-22

## 2022-10-22 PROBLEM — R79.89 ELEVATED TROPONIN LEVEL: Status: ACTIVE | Noted: 2022-10-22

## 2022-10-22 PROBLEM — E78.00 HYPERCHOLESTEROLEMIA: Status: ACTIVE | Noted: 2022-10-22

## 2022-10-22 LAB
ALBUMIN SERPL BCP-MCNC: 3.7 G/DL (ref 3.5–5)
ALP SERPL-CCNC: 56 U/L (ref 46–116)
ALT SERPL W P-5'-P-CCNC: 29 U/L (ref 12–78)
ANION GAP SERPL CALCULATED.3IONS-SCNC: 11 MMOL/L (ref 4–13)
AST SERPL W P-5'-P-CCNC: 17 U/L (ref 5–45)
BACTERIA UR CULT: NORMAL
BASOPHILS # BLD AUTO: 0.05 THOUSANDS/ÂΜL (ref 0–0.1)
BASOPHILS NFR BLD AUTO: 1 % (ref 0–1)
BETA-HYDROXYBUTYRATE: 1.7 MMOL/L
BILIRUB SERPL-MCNC: 0.42 MG/DL (ref 0.2–1)
BUN SERPL-MCNC: 14 MG/DL (ref 5–25)
CALCIUM SERPL-MCNC: 8.9 MG/DL (ref 8.3–10.1)
CARDIAC TROPONIN I PNL SERPL HS: 189 NG/L (ref 8–18)
CHLORIDE SERPL-SCNC: 108 MMOL/L (ref 96–108)
CK SERPL-CCNC: 88 U/L (ref 26–192)
CO2 SERPL-SCNC: 22 MMOL/L (ref 21–32)
CREAT SERPL-MCNC: 0.66 MG/DL (ref 0.6–1.3)
EOSINOPHIL # BLD AUTO: 0.15 THOUSAND/ÂΜL (ref 0–0.61)
EOSINOPHIL NFR BLD AUTO: 2 % (ref 0–6)
ERYTHROCYTE [DISTWIDTH] IN BLOOD BY AUTOMATED COUNT: 13.1 % (ref 11.6–15.1)
GFR SERPL CREATININE-BSD FRML MDRD: 97 ML/MIN/1.73SQ M
GLUCOSE SERPL-MCNC: 126 MG/DL (ref 65–140)
GLUCOSE SERPL-MCNC: 151 MG/DL (ref 65–140)
GLUCOSE SERPL-MCNC: 152 MG/DL (ref 65–140)
GLUCOSE SERPL-MCNC: 153 MG/DL (ref 65–140)
GLUCOSE SERPL-MCNC: 158 MG/DL (ref 65–140)
HAV IGM SER QL: NORMAL
HBV CORE IGM SER QL: NORMAL
HBV SURFACE AG SER QL: NORMAL
HCT VFR BLD AUTO: 42.8 % (ref 34.8–46.1)
HCV AB SER QL: NORMAL
HGB BLD-MCNC: 14.2 G/DL (ref 11.5–15.4)
IMM GRANULOCYTES # BLD AUTO: 0.03 THOUSAND/UL (ref 0–0.2)
IMM GRANULOCYTES NFR BLD AUTO: 0 % (ref 0–2)
LACTATE SERPL-SCNC: 0.6 MMOL/L (ref 0.5–2)
LYMPHOCYTES # BLD AUTO: 2.65 THOUSANDS/ÂΜL (ref 0.6–4.47)
LYMPHOCYTES NFR BLD AUTO: 33 % (ref 14–44)
MAGNESIUM SERPL-MCNC: 1.8 MG/DL (ref 1.6–2.6)
MCH RBC QN AUTO: 32.1 PG (ref 26.8–34.3)
MCHC RBC AUTO-ENTMCNC: 33.2 G/DL (ref 31.4–37.4)
MCV RBC AUTO: 97 FL (ref 82–98)
MONOCYTES # BLD AUTO: 0.92 THOUSAND/ÂΜL (ref 0.17–1.22)
MONOCYTES NFR BLD AUTO: 11 % (ref 4–12)
NEUTROPHILS # BLD AUTO: 4.3 THOUSANDS/ÂΜL (ref 1.85–7.62)
NEUTS SEG NFR BLD AUTO: 53 % (ref 43–75)
NRBC BLD AUTO-RTO: 0 /100 WBCS
PHOSPHATE SERPL-MCNC: 3.5 MG/DL (ref 2.7–4.5)
PLATELET # BLD AUTO: 285 THOUSANDS/UL (ref 149–390)
PMV BLD AUTO: 11.8 FL (ref 8.9–12.7)
POTASSIUM SERPL-SCNC: 3.9 MMOL/L (ref 3.5–5.3)
PROCALCITONIN SERPL-MCNC: <0.05 NG/ML
PROT SERPL-MCNC: 6.6 G/DL (ref 6.4–8.4)
RBC # BLD AUTO: 4.43 MILLION/UL (ref 3.81–5.12)
SODIUM SERPL-SCNC: 141 MMOL/L (ref 135–147)
TSH SERPL DL<=0.05 MIU/L-ACNC: 1.13 UIU/ML (ref 0.45–4.5)
WBC # BLD AUTO: 8.1 THOUSAND/UL (ref 4.31–10.16)

## 2022-10-22 PROCEDURE — 84100 ASSAY OF PHOSPHORUS: CPT | Performed by: INTERNAL MEDICINE

## 2022-10-22 PROCEDURE — 80074 ACUTE HEPATITIS PANEL: CPT | Performed by: INTERNAL MEDICINE

## 2022-10-22 PROCEDURE — 83735 ASSAY OF MAGNESIUM: CPT | Performed by: INTERNAL MEDICINE

## 2022-10-22 PROCEDURE — 82550 ASSAY OF CK (CPK): CPT | Performed by: INTERNAL MEDICINE

## 2022-10-22 PROCEDURE — 80053 COMPREHEN METABOLIC PANEL: CPT | Performed by: INTERNAL MEDICINE

## 2022-10-22 PROCEDURE — 83605 ASSAY OF LACTIC ACID: CPT | Performed by: INTERNAL MEDICINE

## 2022-10-22 PROCEDURE — 87389 HIV-1 AG W/HIV-1&-2 AB AG IA: CPT | Performed by: INTERNAL MEDICINE

## 2022-10-22 PROCEDURE — 85025 COMPLETE CBC W/AUTO DIFF WBC: CPT | Performed by: INTERNAL MEDICINE

## 2022-10-22 PROCEDURE — 82948 REAGENT STRIP/BLOOD GLUCOSE: CPT

## 2022-10-22 PROCEDURE — 84443 ASSAY THYROID STIM HORMONE: CPT | Performed by: INTERNAL MEDICINE

## 2022-10-22 PROCEDURE — 84484 ASSAY OF TROPONIN QUANT: CPT | Performed by: INTERNAL MEDICINE

## 2022-10-22 PROCEDURE — 84145 PROCALCITONIN (PCT): CPT | Performed by: INTERNAL MEDICINE

## 2022-10-22 PROCEDURE — 82010 KETONE BODYS QUAN: CPT

## 2022-10-22 PROCEDURE — 99239 HOSP IP/OBS DSCHRG MGMT >30: CPT | Performed by: INTERNAL MEDICINE

## 2022-10-22 RX ADMIN — DEXTROSE, SODIUM CHLORIDE, AND POTASSIUM CHLORIDE 150 ML/HR: 5; .9; .15 INJECTION INTRAVENOUS at 02:56

## 2022-10-22 RX ADMIN — PANTOPRAZOLE SODIUM 20 MG: 20 TABLET, DELAYED RELEASE ORAL at 08:49

## 2022-10-22 RX ADMIN — ASPIRIN 81 MG: 81 TABLET, COATED ORAL at 08:49

## 2022-10-22 RX ADMIN — LISINOPRIL 40 MG: 20 TABLET ORAL at 08:49

## 2022-10-22 RX ADMIN — AMLODIPINE BESYLATE 5 MG: 5 TABLET ORAL at 08:49

## 2022-10-22 RX ADMIN — ATORVASTATIN CALCIUM 10 MG: 10 TABLET, FILM COATED ORAL at 08:49

## 2022-10-22 NOTE — DISCHARGE SUMMARY
[address] [facility] [address] [place]  Discharge- Osman oBlton 1965, 61 y o  female MRN: [id number]  Unit/Bed#: 415-01 Encounter: [id number]  Primary Care Provider: Maeve Delaney PA-C   Date and time admitted to hospital: 10/21/2022  6:55 AM    * Diabetic ketoacidosis without coma associated with type 2 diabetes mellitus [facility])  Assessment & Plan  Lab Results   Component Value Date    HGBA1C 7 3 (A) 09/16/2022       Recent Labs     10/22/22  0249 10/22/22  0451 10/22/22  0647 10/22/22  0755   POCGLU 126 153* 151* 152*       Blood Sugar Average: Last 72 hrs:  (P) 218 [id number]     · Likely a side effect due to the medication, Jardiance  · Jardiance has been discontinued indefinitely  · The patient was treated with an IV insulin infusion along with continuous IV fluids  · The patient's serum bicarbonate level and anion gap level both normalized by the day of discharge  · The patient's symptoms of abdominal pain, diarrhea, and nausea/vomiting all resolved  · The patient can resume metformin on 10/24/2022  · Continue lisinopril for renal protection  · Outpatient Endocrinology evaluation    Results from last 7 days   Lab Units 10/22/22  0451 10/21/22  1937 10/21/22  1052   SODIUM mmol/L 141 138 139   POTASSIUM mmol/L 3 9 3 7 4 2   CHLORIDE mmol/L 108 106 103   CO2 mmol/L 22 20* 18*   BUN mg/dL 14 14 17   CREATININE mg/dL 0 66 0 78 0 61   CALCIUM mg/dL 8 9 9 1 9 2         Hypercholesterolemia  Assessment & Plan  · Continue statin therapy  • Outpatient follow-up with PCP in regards to this matter    Essential hypertension  Assessment & Plan  · Continue amlodipine and lisinopril  • Outpatient follow-up with PCP in regards to this matter    Elevated troponin level  Assessment & Plan  · Minimally elevated troponin elevation likely a non-MI troponin elevation due to diabetic ketoacidosis and tachycardia  · The patient did not want any additional blood draws at this time to repeat the troponin level    · The patient needs an outpatient ischemic work-up including a transthoracic echocardiogram, an exercise nuclear stress test, and an outpatient Cardiology evaluation  Diarrhea  Assessment & Plan  · Resolved      Elevated hemoglobin (HCC)  Assessment & Plan  · Likely due to volume depletion  · Resolved with IV fluids  · Follow the CBC as an outpatient with her PCP  · If the elevated hemoglobin level recurs, she will need an outpatient Hematology evaluation  Fatty liver  Assessment & Plan  · Obtain optimal glycemic control  · Outpatient Gastroenterology evaluation  · Outpatient surveillance imaging of the hepatic steatosis with PCP    CT scan of the abdomen/pelvis (10/21/2022):  LIVER/BILIARY TREE:  Unchanged hepatomegaly with fatty infiltration  No CT evidence of suspicious hepatic mass  Normal hepatic contours  No biliary dilatation  Right upper quadrant ultrasound (10/21/2022): IMPRESSION:  1  Diffusely hyperechoic appearance of the liver parenchyma which is most compatible with steatosis  2   No choleliths are identified  Hepatomegaly  Assessment & Plan  · Please see the assessment and plan for "Fatty liver"    Myelolipoma of right adrenal gland  Assessment & Plan  · Outpatient surveillance imaging with PCP    CT scan of the abdomen/pelvis (10/21/2022):  ADRENAL GLANDS:  Unchanged 2 4 x 2 2 cm fat density nodule in the right adrenal gland compatible with myelolipoma  Discharging Physician / Practitioner: Keon Haro  PCP: Nia Sherman PA-C  Admission Date:   Admission Orders (From admission, onward)     Ordered        10/21/22 0916  Inpatient Admission  Once                      Discharge Date: 10/22/22     Please note that the patient improved more rapidly than expected and only required a 1-midnight hospitalization      Consultations During Hospital Stay:  · None    Procedures Performed:   · None    Significant Findings / Test Results:   US right upper quadrant    Result Date: 10/21/2022  Impression: 1  Diffusely hyperechoic appearance of the liver parenchyma which is most compatible with steatosis  2   No choleliths are identified  Workstation performed: [id number]     CT abdomen pelvis with contrast    Result Date: 10/21/2022  Impression: No acute pathology  Colonic diverticulosis without diverticulitis  Hepatomegaly with fatty infiltration  Workstation performed: [id number]     ECG 12 lead    Status: In process       MUSE LINK     View MUSE Strips    Study Result    Narrative & Impression   Normal sinus rhythm  Low voltage QRS  Borderline ECG  When compared with ECG of 10-FEB-2022 11:39,  No significant change was found     Results from last 7 days   Lab Units 10/22/22  0451 10/21/22  0717   WBC Thousand/uL 8 10 10 44*   HEMOGLOBIN g/dL 14 2 16 4*   HEMATOCRIT % 42 8 49 2*   PLATELETS Thousands/uL 285 325     Results from last 7 days   Lab Units 10/22/22  0451 10/21/22  1937 10/21/22  1052   SODIUM mmol/L 141 138 139   POTASSIUM mmol/L 3 9 3 7 4 2   CHLORIDE mmol/L 108 106 103   CO2 mmol/L 22 20* 18*   BUN mg/dL 14 14 17   CREATININE mg/dL 0 66 0 78 0 61   CALCIUM mg/dL 8 9 9 1 9 2     Results from last 7 days   Lab Units 10/22/22  0451 10/21/22  0732   MAGNESIUM mg/dL 1 8 1 9     Results from last 7 days   Lab Units 10/22/22  0451 10/21/22  0717   ALK PHOS U/L 56 68   ALT U/L 29 31   AST U/L 17 16     Microbiology Results (last 21 days)    Collected Updated Procedure Result Status Patient Facility Result Comment    10/21/2022 0938 10/21/2022 1023 COVID19, Influenza A/B, RSV PCR, [facility] [[id number]]    Nasopharyngeal Swab    Final result [address] Guidelines URL to browser: [email or web address]  [email or web address]   SARS-CoV-2 assay is a Nucleic Acid Amplification assay intended for the   qualitative detection of nucleic acid from SARS-CoV-2 in nasopharyngeal   swabs   Results are for the presumptive identification of SARS-CoV-2 RNA  Positive results are indicative of infection with SARS-CoV-2, the virus   causing COVID-19, but do not rule out bacterial infection or co-infection   with other viruses  Laboratories within the [place] and its   territories are required to report all positive results to the appropriate   public health authorities  Negative results do not preclude SARS-CoV-2   infection and should not be used as the sole basis for treatment or other   patient management decisions  Negative results must be combined with   clinical observations, patient history, and epidemiological information  This test has not been FDA cleared or approved  This test has been authorized by FDA under an Emergency Use Authorization   (EUA)  This test is only authorized for the duration of time the   declaration that circumstances exist justifying the authorization of the   emergency use of an in vitro diagnostic tests for detection of SARS-CoV-2   virus and/or diagnosis of COVID-19 infection under section 564(b)(1) of   the Act, 21 U  S C  416VRH-7(A)(9), unless the authorization is terminated   or revoked sooner  The test has been validated but independent review by FDA   and CLIA is pending  Test performed using [employer]: This RT-PCR assay targets N2,   a region unique to SARS-CoV-2  A conserved region in the E-gene was chosen   for pan-Sarbecovirus detection which includes SARS-CoV-2  According to CMS-2020-01-R, this platform meets the definition of high-throughput technology      Component Value   SARS-CoV-2 Negative   INFLUENZA A PCR Negative   INFLUENZA B PCR Negative   RSV PCR Negative          10/21/2022 0826 10/21/2022 0950 Urine culture [[id number]]   Urine, Clean Catch    In process [address]  Component Value   No component results                 Incidental Findings:   · As above     Test Results Pending at Discharge (will require follow-up):  · Urine culture from 1021/2022     Outpatient Tests Requested:  · CBC with diff , CMP, Magnesium level, and Phosphorus level in 3-5 days with PCP  · Transthoracic echocardiogram and exercise nuclear stress test with Cardiology    Complications:  None    Reason for Admission:  Diabetic ketoacidosis    Hospital Course:   Elida Spencer is a 61 y o  female patient who originally presented to the hospital on 10/21/2022 due to abdominal pain, diarrhea, nausea, and vomiting  Please see above list of diagnoses and related plan for additional information  Condition at Discharge: good    Discharge Day Visit / Exam:   Subjective: The patient was seen and examined  The patient is doing better  No chest pain  No shortness of breath  No abdominal pain  No nausea or vomiting  No diarrhea  Vitals: Blood Pressure: 144/98 (10/22/22 0724)  Pulse: 81 (10/22/22 0724)  Temperature: 97 8 °F (36 6 °C) (10/22/22 0724)  Temp Source: Temporal (10/21/22 0657)  Respirations: 20 (10/21/22 1311)  Height: 5' 11" (180 3 cm) (10/21/22 1312)  Weight - Scale: 93 2 kg (205 lb 7 5 oz) (10/21/22 1312)  SpO2: 98 % (10/22/22 0724)  Exam:   Physical Exam   General:  NAD, follows commands  HEENT:  NC/AT, mucous membranes moist  Neck:  Supple, No JVP elevation  CV:  + S1, + S2, RRR  Pulm:  Lung fields are CTA bilaterally  Abd:  Soft, Non-tender, Non-distended  Ext:  No clubbing/cyanosis/edema  Skin:  No rashes  Neuro:  Awake, alert, oriented  Psych:  Normal mood and affect    Discharge instructions/Information to patient and family:  See after visit summary for information provided to patient and family  Provisions for Follow-Up Care:  See after visit summary for information related to follow-up care and any pertinent home health orders  Disposition:   Home    Planned Readmission:  No     Discharge Statement:  I spent 40 minutes discharging the patient  This time was spent on the day of discharge   I had direct contact with the patient on the day of discharge  Greater than 50% of the total time was spent examining patient, answering all patient questions, arranging and discussing plan of care with patient as well as directly providing post-discharge instructions  Additional time then spent on discharge activities  Discharge Medications:  See after visit summary for reconciled discharge medications provided to patient and/or family        **Please Note: This note may have been constructed using a voice recognition system**

## 2022-10-22 NOTE — ASSESSMENT & PLAN NOTE
4 weeks of intermittent upper abdominal pain, waxing and waning, aching pain with bloating relieved with belching  The pain is epigastric and RUQ, radiating to the LUQ  Associated with nonbloody diarrhea over the past several weeks and fatigue    · H/o fatty liver w/ hepatomegaly, diverticulosis  · Patient has been taking Metformin 1000mg BID w/o any symptoms  · Began taking Ozempic 4 months prior until 5 weeks ago, discontinued due to epigastric pain and bloating sensation  · Prior episode of epigastric pain 2/2021, diagnosed as gastritis, resolved  · The patient has been following a low carb diet   · ABG CO2 25 7, HCO3 14 8, pH 7 378  · WBC 10 44, Hgb/HCT 16 4/49 2  · CT abd and pelvis: no acute pathology     · Full diet, monitor for tolerance   · Full stool analysis for bacterial, viral causes pending   · Monitor CBC, CMP, vitals
4 weeks of intermittent upper abdominal pain, waxing and waning, aching pain with bloating relieved with belching  The pain is epigastric and RUQ, radiating to the LUQ  Associated with nonbloody diarrhea over the past several weeks and fatigue    · Patient has been taking Metformin 1000mg BID w/o any symptoms  · Began taking Ozempic 4 months prior until 5 weeks ago, discontinued due to epigastric pain and bloating sensation  · At that time, Jardiance was increased from 10mg to 25mg  · Prior episode of epigastric pain 2/2021, diagnosed as gastritis, resolved  · The patient has been following a low carb diet   · UA was positive for glucose, ketones  · ABG CO2 25 7, HCO3 14 8, pH 7 378  · Glu 163, Beta Hydroxybutyrate 5 0, AG 18  · WBC 10 44, Hgb/HCT 16 4/49 2  · CT abd and pelvis: no acute pathology     · Hep panel, CK, procalcitonin, lactic acid pending  · Full stool analysis for bacterial, viral causes pending   · D/c Jardiance, on Insulin sliding scale while hospitalized  · Will continue with D5 IV and Insulin IV   · Continue home Pantoprazole 20mg   · Continue to trend POCT glu and Anion gap closure  · Full diet, monitor for tolerance   · Monitor CBC, CMP, vitals
Lab Results   Component Value Date    HGBA1C 7 3 (A) 09/16/2022       Recent Labs     10/21/22  0904 10/21/22  1044 10/21/22  1205 10/21/22  1300   POCGLU 124 129 121 191*       Blood Sugar Average: Last 72 hrs:  (P) 141 25     9/16/2022 A1c: 7 3  See A&P for Abdominal Pain
Lab Results   Component Value Date    HGBA1C 7 3 (A) 09/16/2022       Recent Labs     10/22/22  0249 10/22/22  0451 10/22/22  0647 10/22/22  0755   POCGLU 126 153* 151* 152*       Blood Sugar Average: Last 72 hrs:  (P) 451 [id number]     · Likely a side effect due to the medication, Jardiance  · Jardiance has been discontinued indefinitely  · The patient was treated with an IV insulin infusion along with continuous IV fluids  · The patient's serum bicarbonate level and anion gap level both normalized by the day of discharge  · The patient's symptoms of abdominal pain, diarrhea, and nausea/vomiting all resolved  · The patient can resume metformin on 10/24/2022    · Continue lisinopril for renal protection  · Outpatient Endocrinology evaluation    Results from last 7 days   Lab Units 10/22/22  0451 10/21/22  1937 10/21/22  1052   SODIUM mmol/L 141 138 139   POTASSIUM mmol/L 3 9 3 7 4 2   CHLORIDE mmol/L 108 106 103   CO2 mmol/L 22 20* 18*   BUN mg/dL 14 14 17   CREATININE mg/dL 0 66 0 78 0 61   CALCIUM mg/dL 8 9 9 1 9 2
See A&P for Fatty Liver
· CT Abd Pelvis: ADRENAL GLANDS:  Unchanged 2 4 x 2 2 cm fat density nodule in the right adrenal gland compatible with myelolipoma  · Patient presenting with RUQ  · CBC w/o signs of anemia  · Na 138, K 3 7, Glu 163  · Will continue to monitor labs for derangements while evaluating cause of anion gap metabolic acidosis 
· Continue amlodipine and lisinopril  • Outpatient follow-up with PCP in regards to this matter
· Continue statin therapy  • Outpatient follow-up with PCP in regards to this matter
· Hgb/HCT 16 4/49 2  · Elevated in February as well  · No diagnosis noted  · Will continue to monitor
· Likely due to volume depletion  · Resolved with IV fluids  · Follow the CBC as an outpatient with her PCP  · If the elevated hemoglobin level recurs, she will need an outpatient Hematology evaluation 
· Minimally elevated troponin elevation likely a non-MI troponin elevation due to diabetic ketoacidosis and tachycardia  · The patient did not want any additional blood draws at this time to repeat the troponin level  · The patient needs an outpatient ischemic work-up including a transthoracic echocardiogram, an exercise nuclear stress test, and an outpatient Cardiology evaluation 
· Obtain optimal glycemic control  · Outpatient Gastroenterology evaluation  · Outpatient surveillance imaging of the hepatic steatosis with PCP    CT scan of the abdomen/pelvis (10/21/2022):  LIVER/BILIARY TREE:  Unchanged hepatomegaly with fatty infiltration  No CT evidence of suspicious hepatic mass  Normal hepatic contours  No biliary dilatation  Right upper quadrant ultrasound (10/21/2022): IMPRESSION:  1  Diffusely hyperechoic appearance of the liver parenchyma which is most compatible with steatosis  2   No choleliths are identified 
· Outpatient surveillance imaging with PCP    CT scan of the abdomen/pelvis (10/21/2022):  ADRENAL GLANDS:  Unchanged 2 4 x 2 2 cm fat density nodule in the right adrenal gland compatible with myelolipoma 
· Please see the assessment and plan for "Fatty liver"
· Resolved
· Seen on RUQ U/S 7/2021 w hepatomegaly  · Patient experiencing RUQ  · Transaminases WNL   · Hep panel, CK, procalcitonin, lactic acid pending
Him/He

## 2022-10-22 NOTE — DISCHARGE INSTRUCTIONS
PLEASE DISPOSE OF YOUR TEST STRIPS AND LANCETS USED TO CHECK YOUR BLOOD SUGAR IN A PROPER NEEDLE/SHARPS CONTAINER  ALTERNATIVELY, YOU CAN USE AN EMPTY LAUNDRY DETERGENT BOTTLE  WHEN THE BOTTLE IS FULL, SEAL IT WITH THE LID, AND WRAP IN COMPLETELY IN DUCT TAPE PRIOR TO THROWING IT IN THE GARBAGE

## 2022-10-22 NOTE — CASE MANAGEMENT
Case Management Discharge Planning Note    Patient name Patricia Oakley  Location /686-45 MRN [id number]  : 1965 Date 10/22/2022       Current Admission Date: 10/21/2022  Current Admission Diagnosis:Diabetic ketoacidosis without coma associated with type 2 diabetes mellitus [facility])   Patient Active Problem List    Diagnosis Date Noted   • Elevated troponin level 10/22/2022   • Myelolipoma of right adrenal gland 10/21/2022   • Hepatomegaly 10/21/2022   • Fatty liver 10/21/2022   • Elevated hemoglobin ([facility] [address] ) 10/21/2022   • Diabetic ketoacidosis without coma associated with type 2 diabetes mellitus ([facility] [address] ) 10/21/2022   • Abdominal pain, colicky    • Diarrhea 10/21/2022   • Type 2 diabetes mellitus with hyperglycemia, without long-term current use of insulin (HCC)       LOS (days): 1  Geometric Mean LOS (GMLOS) (days):   Days to GMLOS:     OBJECTIVE:  Risk of Unplanned Readmission Score: 9 54         Current admission status: Inpatient   Preferred Pharmacy:   [facility] [address] [place] [address]  Phone: [phone] Fax: [phone]    [address]  Phone: [phone] Fax: [phone] [place] [address]  Phone: [phone] Fax: [phone]    Primary Care Provider: Mo Kennedy PA-C    Primary Insurance: BLUE CROSS  Secondary Insurance:     DISCHARGE DETAILS: patient discharged to home  Nurse reviewed AVS, all follow up providers listed  No other discharge needs noted

## 2022-10-22 NOTE — PLAN OF CARE
Problem: PAIN - ADULT  Goal: Verbalizes/displays adequate comfort level or baseline comfort level  Description: Interventions:  - Encourage patient to monitor pain and request assistance  - Assess pain using appropriate pain scale(0-10)  - Administer analgesics based on type and severity of pain and evaluate response  - Implement non-pharmacological measures as appropriate and evaluate response  - Consider cultural and social influences on pain and pain management  - Notify physician/advanced practitioner if interventions unsuccessful or patient reports new pain  Outcome: Progressing     Problem: INFECTION - ADULT  Goal: Absence or prevention of progression during hospitalization  Description: INTERVENTIONS:  - Assess and monitor for signs and symptoms of infection  - Monitor lab/diagnostic results  - Monitor all insertion sites, i e  indwelling lines  - Administer medications as ordered  - Instruct and encourage patient and family to use good hand hygiene technique    Outcome: Progressing     Problem: DISCHARGE PLANNING  Goal: Discharge to home or other facility with appropriate resources  Description: INTERVENTIONS:  - Identify barriers to discharge w/patient and caregiver  - Arrange for needed discharge resources and transportation as appropriate  - Identify discharge learning needs (meds, wound care, etc )  - Refer to Case Management Department for coordinating discharge planning if the patient needs post-hospital services based on physician/advanced practitioner order or complex needs related to functional status, cognitive ability, or social support system  Outcome: Progressing     Problem: Knowledge Deficit  Goal: Patient/family/caregiver demonstrates understanding of disease process, treatment plan, medications, and discharge instructions  Description: Complete learning assessment and assess knowledge base    Interventions:  - Provide teaching at level of understanding  - Provide teaching via preferred learning methods  Outcome: Progressing

## 2022-10-24 ENCOUNTER — TRANSITIONAL CARE MANAGEMENT (OUTPATIENT)
Dept: FAMILY MEDICINE CLINIC | Facility: CLINIC | Age: 57
End: 2022-10-24

## 2022-10-24 LAB
ATRIAL RATE: 99 BPM
HIV 1+2 AB+HIV1 P24 AG SERPL QL IA: NORMAL
P AXIS: 35 DEGREES
PR INTERVAL: 150 MS
QRS AXIS: 4 DEGREES
QRSD INTERVAL: 82 MS
QT INTERVAL: 354 MS
QTC INTERVAL: 454 MS
T WAVE AXIS: 26 DEGREES
VENTRICULAR RATE: 99 BPM

## 2022-10-24 PROCEDURE — 93010 ELECTROCARDIOGRAM REPORT: CPT | Performed by: INTERNAL MEDICINE

## 2022-10-24 NOTE — UTILIZATION REVIEW
Initial Clinical Review    Admission: Date/Time/Statement:   Admission Orders (From admission, onward)     Ordered        10/21/22 0916  Inpatient Admission  Once                      Orders Placed This Encounter   Procedures   • Inpatient Admission     Standing Status:   Standing     Number of Occurrences:   1     Order Specific Question:   Level of Care     Answer:   Med Surg [16]     Order Specific Question:   Estimated length of stay     Answer:   More than 2 Midnights     Order Specific Question:   Certification     Answer:   I certify that inpatient services are medically necessary for this patient for a duration of greater than two midnights  See H&P and MD Progress Notes for additional information about the patient's course of treatment  ED Arrival Information     Expected   -    Arrival   10/21/2022 06:37    Acuity   Urgent            Means of arrival   Walk-In    Escorted by   Self    Service   Hospitalist    Admission type   Emergency            Arrival complaint   Abdominal Pain           Chief Complaint   Patient presents with   • Abdominal Pain     Pt c/o upper abdominal pain intermittently over the past month  Pt denies n/v but states she has been having episodes of diarrhea described as "piled cow shit"       Initial Presentation: 61 y o  female presents to ed from home for evaluation and treatment of upper abdominal pain intermittently for the past month associated with diarrhea  PMHX: DM, HTNClinical assessment significant for tachycardia, hypertension  UA : ketones, glucose >=1000, B HB 5 0,CO2 19, AN GAP 18, WBC 10 44  Imaging :  liver steatosis, hepatomegaly, colonic diverticulosis  Initially treated with iv LR bolus, iv D5% ns 150 hr, insulin gtt  Admit to inpatient med surg for diabetic acidosis likely due to Jardiance, diarrhea  Plan for insulin gtt, glucose q2 hr, discontinue Jardiance, monitor na bicarb, an gap and electrolytes  Obtain stool studies , regular diet       Date: 10-22-22  Day 2:inpatient   Iv d5% ms discontinued at 0746  Iv insulin gtt discontinued at 1147  Discharged to home  ED Triage Vitals   10/21/22 0657 10/21/22 8092 10/21/22 0657 10/21/22 0658 10/21/22 0658   98 3 °F (36 8 °C) (!) 107 16 (!) 179/82 100 %      Temporal Monitor         No Pain          10/21/22 93 2 kg (205 lb 7 5 oz)     Additional Vital Signs:     Date/Time Temp Pulse Resp BP MAP (mmHg) SpO2 O2 Device   10/22/22 07:24:56 97 8 °F (36 6 °C) 81 -- 144/98 113 98 % --   10/21/22 15:37:37 -- 91 -- 146/87 107 96 % --   10/21/22 13:12:32 98 °F (36 7 °C) 113 Abnormal  -- 128/88 101 97 % --   10/21/22 1311 -- 112 Abnormal  20 128/88 101 98 % --   10/21/22 1230 -- 117 Abnormal  16 128/80 98 96 % None (Room air)   10/21/22 1200 -- 106 Abnormal  16 127/75 95 97 % None (Room air)   10/21/22 1130 -- 103 16 141/81 106 97 % None (Room air)   10/21/22 0930 -- 108 Abnormal  16 150/78 106 98 % None (Room air)   10/21/22 0915 -- 108 Abnormal  16 150/94 114 99 % None (Room air)   10/21/22 0730 -- 107 Abnormal  16 158/78 111 99 % None (Room air)   10/21/22 0658 -- 107 Abnormal  -- 179/82   Abnormal  -- 100 % None (Room air)   10/21/22 0657 98 3 °F (36 8 °C) -- 16 -- -- -- --               Pertinent Labs/Diagnostic Test Results:     US right upper quadrant   Final  (10/21 1042)   1  Diffusely hyperechoic appearance of the liver parenchyma which is most compatible with steatosis  2   No choleliths are identified  CT abdomen pelvis with contrast   Final (10/21 0856)      No acute pathology  Colonic diverticulosis without diverticulitis  Hepatomegaly with fatty infiltration          Results from last 7 days   Lab Units 10/21/22  0938   SARS-COV-2  Negative     Results from last 7 days   Lab Units 10/22/22  0451 10/21/22  0717   WBC Thousand/uL 8 10 10 44*   HEMOGLOBIN g/dL 14 2 16 4*   HEMATOCRIT % 42 8 49 2*   PLATELETS Thousands/uL 285 325   NEUTROS ABS Thousands/µL 4 30 7 13         Results from last 7 days   Lab Units 10/22/22  0451 10/21/22  1937 10/21/22  1052 10/21/22  0732 10/21/22  0717   SODIUM mmol/L 141 138 139  --  138   POTASSIUM mmol/L 3 9 3 7 4 2  --  3 7   CHLORIDE mmol/L 108 106 103  --  101   CO2 mmol/L 22 20* 18*  --  19*   ANION GAP mmol/L 11 12 18*  --  18*   BUN mg/dL 14 14 17  --  20   CREATININE mg/dL 0 66 0 78 0 61  --  0 68   EGFR ml/min/1 73sq m 97 84 100  --  96   CALCIUM mg/dL 8 9 9 1 9 2  --  10 0   MAGNESIUM mg/dL 1 8  --   --  1 9  --    PHOSPHORUS mg/dL 3 5  --   --  4 2  --      Results from last 7 days   Lab Units 10/22/22  0451 10/21/22  0717   AST U/L 17 16   ALT U/L 29 31   ALK PHOS U/L 56 68   TOTAL PROTEIN g/dL 6 6 8 1   ALBUMIN g/dL 3 7 4 7   TOTAL BILIRUBIN mg/dL 0 42 0 43     Results from last 7 days   Lab Units 10/22/22  0755 10/22/22  0647 10/22/22  0451 10/22/22  0249 10/21/22  2332 10/21/22  2031 10/21/22  1908 10/21/22  1707 10/21/22  1502 10/21/22  1300 10/21/22  1205 10/21/22  1044   POC GLUCOSE mg/dl 152* 151* 153* 126 97 85 110 104 111 191* 121 129     Results from last 7 days   Lab Units 10/22/22  0451 10/21/22  1937 10/21/22  1052 10/21/22  0717   GLUCOSE RANDOM mg/dL 158* 107 135 163*             BETA-HYDROXYBUTYRATE   Date Value Ref Range Status   10/22/2022 1 7 (H) <0 6 mmol/L Final   10/21/2022 5 0 (H) <0 6 mmol/L Final      Results from last 7 days   Lab Units 10/21/22  1356   PH ART  7 378   PCO2 ART mm Hg 25 7*   PO2 ART mm Hg 86 6   HCO3 ART mmol/L 14 8*   BASE EXC ART mmol/L -8 4   O2 CONTENT ART mL/dL 20 6   O2 HGB, ARTERIAL % 95 6   ABG SOURCE  Radial, Left     Results from last 7 days   Lab Units 10/21/22  0825   PH GIBRAN  7 328   PCO2 GIBRAN mm Hg 30 2*   PO2 GIBRAN mm Hg 56 9*   HCO3 GIBRAN mmol/L 15 5*   BASE EXC GIBRAN mmol/L -8 9   O2 CONTENT GIBRAN ml/dL 19 7   O2 HGB, VENOUS % 86 5*         Results from last 7 days   Lab Units 10/22/22  0451   CK TOTAL U/L 88       Results from last 7 days   Lab Units 10/22/22  0451   TSH 3RD GENERATON uIU/mL 1 129     Results from last 7 days   Lab Units 10/22/22  0451   PROCALCITONIN ng/ml <0 05     Results from last 7 days   Lab Units 10/22/22  0451 10/21/22  0732   LACTIC ACID mmol/L 0 6 1 4       Results from last 7 days   Lab Units 10/22/22  0451   HEP B S AG  Non-reactive   HEP C AB  Non-reactive   HEP B C IGM  Non-reactive     Results from last 7 days   Lab Units 10/21/22  0717   LIPASE u/L 156       Results from last 7 days   Lab Units 10/21/22  0826   CLARITY UA  Clear   COLOR UA  Light Yellow   SPEC GRAV UA  1 015   PH UA  5 5   GLUCOSE UA mg/dl >=1000 (1%)*   KETONES UA mg/dl >=80 (3+)*   BLOOD UA  Negative   PROTEIN UA mg/dl Negative   NITRITE UA  Negative   BILIRUBIN UA  Negative   UROBILINOGEN UA E U /dl 0 2   LEUKOCYTES UA  Elevated glucose may cause decreased leukocyte values   See urine microscopic for [facility] result/*   WBC UA /hpf None Seen   RBC UA /hpf None Seen   BACTERIA UA /hpf None Seen   EPITHELIAL CELLS WET PREP /hpf Occasional     Results from last 7 days   Lab Units 10/21/22  0938   INFLUENZA A PCR  Negative   INFLUENZA B PCR  Negative   RSV PCR  Negative       Results from last 7 days   Lab Units 10/21/22  0826   URINE CULTURE  20,000-29,000 cfu/ml        ED Treatment:   Medication Administration from 10/21/2022 0435 to 10/21/2022 1252       Date/Time Order Dose Route Action     10/21/2022 0732 lactated ringers bolus 1,000 mL 1,000 mL Intravenous New Bag     10/21/2022 0940 dextrose 5 % and sodium chloride 0 9 % infusion 150 mL/hr Intravenous New Bag     10/21/2022 0936 insulin regular (HumuLIN R,NovoLIN R) 1 Units/mL in sodium chloride 0 9 % 100 mL infusion 0 5 Units/hr Intravenous New Bag        Past Medical History:   Diagnosis   • Diabetes mellitus ([address] )   • High cholesterol   • Hypertension     Present on Admission:  **None**      Admitting Diagnosis:     Abdominal pain [Z77 5]  Metabolic acidosis, increased anion gap [E87 29]  Elevated beta-hydroxybutyrate [R78 89]      Age/Sex: 61 y o  female     dextrose 5 % and sodium chloride 0 9 % with KCl 20 mEq/L infusion (premix)  Rate: 150 mL/hr Dose: 150 mL/hr  Freq: Continuous Route: IV  Last Dose: Stopped (10/22/22 0746)  Start: 10/21/22 2015 End: 10/22/22 0737    insulin regular (HumuLIN R,NovoLIN R) 1 Units/mL in sodium chloride 0 9 % 100 mL infusion  Rate: 0 3-21 mL/hr Dose: 0 3-21 Units/hr  Freq: Titrated Route: IV  Last Dose: Stopped (10/21/22 2034)  Start: 10/21/22 0930 End: 10/22/22 1147    Network Utilization Review Department  ATTENTION: Please call with any questions or concerns to [phone] and carefully listen to the prompts so that you are directed to the right person  All voicemails are confidential   Yolande Ria all requests for admission clinical reviews, approved or denied determinations and any other requests to dedicated fax number below belonging to the campus where the patient is receiving treatment   List of dedicated fax numbers for the Facilities:  [address] DENIALS (Administrative/Medical Necessity) [phone]   [address] (Maternity/NICU/Pediatrics) [phone]   [address] [phone]   [facility]  [phone]   [address] [place] [address] [address] [phone]   [address] [facility] [place] [address] [facility] [phone]

## 2022-10-24 NOTE — UTILIZATION REVIEW
NOTIFICATION OF INPATIENT ADMISSION   AUTHORIZATION REQUEST   SERVICING FACILITY:   [address] [place] [address]  [address] [address], [place], [address]  Tax ID:  [id number]  NPI: [id number] ATTENDING PROVIDER:  Attending Name and NPI#: Emely Alfonso [[id number]]  Address:  [address]  Phone: [phone]     ADMISSION INFORMATION:  Place of Service: Inpatient [facility]  [address]  Place of Service Code: 21  Inpatient Admission Date/Time: 10/21/22  9:16 AM  Discharge Date/Time: 10/22/2022  9:47 AM  Admitting Diagnosis Code/Description:  Abdominal pain [J80 6]  Metabolic acidosis, increased anion gap [E87 29]  Elevated beta-hydroxybutyrate [R78 89]     UTILIZATION REVIEW CONTACT:  Sybil Albarran Utilization   Network Utilization Review Department  Phone: [phone]  Fax [phone]  Email: [email or web address]  Contact for approvals/pending authorizations, clinical reviews, and discharge  PHYSICIAN ADVISORY SERVICES:  Medical Necessity Denial & [id number] Review  Phone: [phone]  Fax: [phone]  Email: [email or web address]

## 2022-10-25 ENCOUNTER — OFFICE VISIT (OUTPATIENT)
Dept: FAMILY MEDICINE CLINIC | Facility: CLINIC | Age: 57
End: 2022-10-25
Payer: COMMERCIAL

## 2022-10-25 VITALS
DIASTOLIC BLOOD PRESSURE: 94 MMHG | BODY MASS INDEX: 29.12 KG/M2 | HEIGHT: 71 IN | WEIGHT: 208 LBS | SYSTOLIC BLOOD PRESSURE: 136 MMHG | TEMPERATURE: 98.3 F | OXYGEN SATURATION: 99 % | HEART RATE: 110 BPM

## 2022-10-25 DIAGNOSIS — H35.63 RETINAL HEMORRHAGE, BILATERAL: ICD-10-CM

## 2022-10-25 DIAGNOSIS — Z09 HOSPITAL DISCHARGE FOLLOW-UP: Primary | ICD-10-CM

## 2022-10-25 DIAGNOSIS — E11.319 DIABETIC RETINOPATHY ASSOCIATED WITH TYPE 2 DIABETES MELLITUS, MACULAR EDEMA PRESENCE UNSPECIFIED, UNSPECIFIED LATERALITY, UNSPECIFIED RETINOPATHY SEVERITY (HCC): ICD-10-CM

## 2022-10-25 DIAGNOSIS — Z12.39 ENCOUNTER FOR SCREENING FOR MALIGNANT NEOPLASM OF BREAST, UNSPECIFIED SCREENING MODALITY: ICD-10-CM

## 2022-10-25 PROCEDURE — 99495 TRANSJ CARE MGMT MOD F2F 14D: CPT | Performed by: PHYSICIAN ASSISTANT

## 2022-10-25 NOTE — PROGRESS NOTES
Assessment & Plan     1  Hospital discharge follow-up    2  Diabetic retinopathy associated with type 2 diabetes mellitus, macular edema presence unspecified, unspecified laterality, unspecified retinopathy severity ([address] )  -     Ambulatory Referral to Ophthalmology; Future    3  Retinal hemorrhage, bilateral  -     Ambulatory Referral to Ophthalmology; Future    4  Encounter for screening for malignant neoplasm of breast, unspecified screening modality  -     Mammo screening bilateral w 3d & cad; Future; Expected date: 10/25/2022      Depression Screening and Follow-up Plan: Patient was screened for depression during today's encounter  They screened negative with a PHQ-2 score of 0  Subjective     Transitional Care Management Review:   Freddy Fitzpatrick is a 61 y o  female here for TCM follow up  During the TCM phone call patient stated:  TCM Call     Date and time call was made  10/24/2022  8:03 AM    Hospital care reviewed  Records reviewed    Patient was hospitialized at  [address] [place] [address]    Date of Admission  10/21/22    Date of discharge  10/22/22    Diagnosis  diabetic medication reaction    Disposition  Home    Were the patients medications reviewed and updated  No      TCM Call     Scheduled for follow up? Yes    Did you obtain your prescribed medications  Yes    Do you need help managing your prescriptions or medications  No    I have advised the patient to call PCP with any new or worsening symptoms  No Stanton,         Waldemar Lema is here today for hospital follow up  Was admitted to hospital with euglycemic DKA, Jardiance discontinued indefinately and pt started on metformin 1000 mg BID  She started taking it yesterday and noticed some GI side effects, recommend easing into it by taking 1/2 tab BID x 3-4 days then starting full tab BID  Pt is scheduled to see endocrinology on 11/3   Pt with fatty liver, recommended to see GI upon discharge but she declines at this time stating she's had x 15 years  Also, while in hospital, had elevated random troponin level  Pt denies CP/SOB, recommended she be evaluated by cardiology and have workup to include echo and stress test  She is agreeable to this and will call cardiology for appointment when she gets home today  Review of Systems   Constitutional: Negative for activity change, appetite change, chills, diaphoresis, fatigue, fever and unexpected weight change  HENT: Negative for congestion, ear pain, postnasal drip, rhinorrhea, sinus pressure, sinus pain, sneezing, sore throat, tinnitus and voice change  Eyes: Negative for pain, redness and visual disturbance  Respiratory: Negative for cough, chest tightness, shortness of breath and wheezing  Cardiovascular: Negative for chest pain, palpitations and leg swelling  Gastrointestinal: Negative for abdominal pain, blood in stool, constipation, diarrhea, nausea and vomiting  Genitourinary: Negative for difficulty urinating, dysuria, frequency, hematuria and urgency  Musculoskeletal: Negative for arthralgias, back pain, gait problem, joint swelling, myalgias, neck pain and neck stiffness  Skin: Negative for color change, pallor, rash and wound  Neurological: Negative for dizziness, tremors, weakness, light-headedness and headaches  Psychiatric/Behavioral: Negative for dysphoric mood, self-injury, sleep disturbance and suicidal ideas  The patient is not nervous/anxious  Objective     /94   Pulse (!) 110   Temp 98 3 °F (36 8 °C)   Ht 5' 11" (1 803 m)   Wt 94 3 kg (208 lb)   SpO2 99%   BMI 29 01 kg/m²      Physical Exam  Vitals and nursing note reviewed  Constitutional:       General: She is not in acute distress  Appearance: She is well-developed  HENT:      Head: Normocephalic and atraumatic  Eyes:      Conjunctiva/sclera: Conjunctivae normal    Cardiovascular:      Rate and Rhythm: Normal rate and regular rhythm        Heart sounds: No murmur heard   Pulmonary:      Effort: Pulmonary effort is normal  No respiratory distress  Breath sounds: Normal breath sounds  Abdominal:      Palpations: Abdomen is soft  Tenderness: There is no abdominal tenderness  Musculoskeletal:      Cervical back: Neck supple  Skin:     General: Skin is warm and dry  Neurological:      Mental Status: She is alert         Rk Eason PA-C

## 2022-11-03 ENCOUNTER — OFFICE VISIT (OUTPATIENT)
Dept: ENDOCRINOLOGY | Facility: CLINIC | Age: 57
End: 2022-11-03

## 2022-11-03 VITALS
BODY MASS INDEX: 28.84 KG/M2 | HEIGHT: 71 IN | WEIGHT: 206 LBS | HEART RATE: 110 BPM | SYSTOLIC BLOOD PRESSURE: 145 MMHG | DIASTOLIC BLOOD PRESSURE: 85 MMHG

## 2022-11-03 DIAGNOSIS — D17.9 MYELOLIPOMA: ICD-10-CM

## 2022-11-03 DIAGNOSIS — E11.9 TYPE 2 DIABETES MELLITUS WITHOUT COMPLICATION, WITHOUT LONG-TERM CURRENT USE OF INSULIN (HCC): Primary | ICD-10-CM

## 2022-11-03 DIAGNOSIS — R78.89 ELEVATED BETA-HYDROXYBUTYRATE: ICD-10-CM

## 2022-11-03 DIAGNOSIS — E11.10 DIABETIC KETOACIDOSIS WITHOUT COMA ASSOCIATED WITH TYPE 2 DIABETES MELLITUS (HCC): ICD-10-CM

## 2022-11-03 RX ORDER — DULAGLUTIDE 0.75 MG/.5ML
0.75 INJECTION, SOLUTION SUBCUTANEOUS WEEKLY
Qty: 2 ML | Refills: 1 | Status: SHIPPED | OUTPATIENT
Start: 2022-11-03

## 2022-11-03 NOTE — PATIENT INSTRUCTIONS
Continue Metformin    You are being started on a GLP1 agonist injection    Common side effects: nausea, diarrhea, vomiting, decreased appetite, indigestion, and constipation  Rare risks include pancreatitis and medullary thyroid cancer  Call if severe nausea or new abdominal pain  Call if Blood sugar <70 or >250 on three or more occasions    Stop using and go to the emergency room if you develop sudden and severe stomach pain, nausea, vomiting, fever, lightheadedness  If you do experience nausea after starting the Trulicity, here are some recommendations:  -Eat smaller meals   Stop eating when you feel full   -Eat bland, low-fat foods, like crackers, toast, and rice  -Eat foods that contain water, like soups and gelatin  -Avoid lying down after you eat  -Avoid fried or fatty foods

## 2022-11-22 ENCOUNTER — CONSULT (OUTPATIENT)
Dept: CARDIOLOGY CLINIC | Facility: HOSPITAL | Age: 57
End: 2022-11-22
Attending: INTERNAL MEDICINE

## 2022-11-22 VITALS
HEIGHT: 71 IN | DIASTOLIC BLOOD PRESSURE: 98 MMHG | SYSTOLIC BLOOD PRESSURE: 160 MMHG | HEART RATE: 126 BPM | WEIGHT: 201 LBS | BODY MASS INDEX: 28.14 KG/M2

## 2022-11-22 DIAGNOSIS — I25.10 CORONARY ARTERY CALCIFICATION SEEN ON CAT SCAN: Primary | ICD-10-CM

## 2022-11-22 DIAGNOSIS — E11.10 DIABETIC KETOACIDOSIS WITHOUT COMA ASSOCIATED WITH TYPE 2 DIABETES MELLITUS (HCC): ICD-10-CM

## 2022-11-22 DIAGNOSIS — I10 ESSENTIAL HYPERTENSION: ICD-10-CM

## 2022-11-22 DIAGNOSIS — E78.00 HYPERCHOLESTEROLEMIA: ICD-10-CM

## 2022-11-22 DIAGNOSIS — R77.8 ELEVATED TROPONIN LEVEL: ICD-10-CM

## 2022-11-22 DIAGNOSIS — E78.2 MIXED HYPERLIPIDEMIA: ICD-10-CM

## 2022-11-22 RX ORDER — ATORVASTATIN CALCIUM 20 MG/1
20 TABLET, FILM COATED ORAL DAILY
Qty: 90 TABLET | Refills: 3 | Status: SHIPPED | OUTPATIENT
Start: 2022-11-22

## 2022-11-22 NOTE — PROGRESS NOTES
Marito Dodd  1965  [id number]  [facility] [place] [address] [address] [place] [address]  [phone]    1  Coronary artery calcification seen on CAT scan  atorvastatin (LIPITOR) 20 mg tablet    Echo complete w/ contrast if indicated    VAS screening      2  Elevated troponin level  Ambulatory Referral to Cardiology    atorvastatin (LIPITOR) 20 mg tablet    Echo complete w/ contrast if indicated    VAS screening      3  Essential hypertension  atorvastatin (LIPITOR) 20 mg tablet    Echo complete w/ contrast if indicated    VAS screening      4  Hypercholesterolemia  atorvastatin (LIPITOR) 20 mg tablet    Echo complete w/ contrast if indicated    VAS screening      5  Diabetic ketoacidosis without coma associated with type 2 diabetes mellitus ([address] )        6  Mixed hyperlipidemia  atorvastatin (LIPITOR) 20 mg tablet    Echo complete w/ contrast if indicated    VAS screening          Discussion/Summary:  She had a recent reaction to 1 of her diabetic medications and was in the hospital with a minimal troponin  She is diabetic and has family history  She is a remote smoker  CT scan of the chest abdomen pelvis shows coronary artery calcifications and diffuse aorto iliac calcifications  At this point time recommend aspirin 81 daily along with up titration of statin to atorvastatin 20 mg daily  Will follow-up with her in 6 months and recheck lipid profile  Blood pressure is controlled on manual recheck 150/80 she was quite nervous coming in the office today  I have ordered a vascular screening and echocardiogram   She has no symptoms if she develops any exertional symptoms would proceed with stress testing at that time  Interval History:  44-year-old female with a history of diabetes mellitus type 2, hypertension, hyperlipidemia, recent hospital admission for DKA secondary to reaction to diabetic medication    She presents for new patient consultation on behalf of Christy Jordan  Overall she has been feeling well since getting out of the hospital   She leads a very active life does a fair amount of cleaning at home and for her work  No exertional limitations  There has been no chest pain, shortness of breath, palpitations, lightheadedness, dizziness, or syncope  There has been no lower extremity edema, PND, orthopnea      Medical Problems     Problem List     Type 2 diabetes mellitus with hyperglycemia, without long-term current use of insulin (HCC)      Lab Results   Component Value Date    HGBA1C 7 3 (A) 09/16/2022         Myelolipoma of right adrenal gland    Hepatomegaly    Fatty liver    Elevated hemoglobin (HCC)    Diabetic ketoacidosis without coma associated with type 2 diabetes mellitus (HCC)      Lab Results   Component Value Date    HGBA1C 7 3 (A) 09/16/2022         Diarrhea    Elevated troponin level    Essential hypertension    Hypercholesterolemia    Coronary artery calcification seen on CAT scan        Past Medical History:   Diagnosis Date   • Diabetes mellitus ([facility] [address] )    • High cholesterol    • Hypertension      Social History     Socioeconomic History   • Marital status: /Civil Union     Spouse name: Not on file   • Number of children: Not on file   • Years of education: Not on file   • Highest education level: Not on file   Occupational History   • Not on file   Tobacco Use   • Smoking status: Former   • Smokeless tobacco: Never   Vaping Use   • Vaping Use: Never used   Substance and Sexual Activity   • Alcohol use: Never   • Drug use: Never   • Sexual activity: Not on file   Other Topics Concern   • Not on file   Social History Narrative   • Not on file     Social Determinants of Health     Financial Resource Strain: Not on file   Food Insecurity: No Food Insecurity   • Worried About Running Out of Food in the Last Year: Never true   • Ran Out of Food in the Last Year: Never true   Transportation Needs: No Transportation Needs   • Lack of Transportation (Medical): No   • Lack of Transportation (Non-Medical):  No   Physical Activity: Not on file   Stress: Not on file   Social Connections: Not on file   Intimate Partner Violence: Not on file   Housing Stability: Low Risk    • Unable to Pay for Housing in the Last Year: No   • Number of Places Lived in the Last Year: 1   • Unstable Housing in the Last Year: No      Family History   Problem Relation Age of Onset   • Diabetes Mother    • Diabetes Father      Past Surgical History:   Procedure Laterality Date   • HYSTERECTOMY         Current Outpatient Medications:   •  Adult Aspirin Regimen 81 MG EC tablet, TAKE ONE TABLET BY MOUTH EVERY DAY, Disp: 90 tablet, Rfl: 1  •  amLODIPine (NORVASC) 5 mg tablet, TAKE ONE TABLET BY MOUTH EVERY DAY, Disp: 90 tablet, Rfl: 1  •  atorvastatin (LIPITOR) 20 mg tablet, Take 1 tablet (20 mg total) by mouth daily, Disp: 90 tablet, Rfl: 3  •  Dulaglutide (Trulicity) 4 62 MP/5 3BK SOPN, Inject 0 5 mL (0 75 mg total) under the skin once a week, Disp: 2 mL, Rfl: 1  •  folic acid (FOLVITE) 099 MCG tablet, Take 0 5 tablets (400 mcg total) by mouth daily, Disp: 90 tablet, Rfl: 1  •  glucose blood (Accu-Chek Guide) test strip, Test twice daily for fluctuating blood sugars, Disp: 100 strip, Rfl: 3  •  lisinopril (ZESTRIL) 40 mg tablet, TAKE ONE TABLET BY MOUTH EVERY DAY, Disp: 90 tablet, Rfl: 1  •  metFORMIN (GLUCOPHAGE) 1000 MG tablet, Take 1 tablet (1,000 mg total) by mouth 2 (two) times a day with meals Do not start before October 24, 2022 , Disp: , Rfl: 0  Allergies   Allergen Reactions   • Jardiance [Empagliflozin] GI Intolerance   • Ozempic (0 25 Or 0 5 Mg-Dose) [Semaglutide(0 25 Or 0 5mg-Dos)] GI Intolerance       Labs:     Chemistry        Component Value Date/Time    K 3 9 10/22/2022 0451     10/22/2022 0451    CO2 22 10/22/2022 0451    BUN 14 10/22/2022 0451    CREATININE 0 66 10/22/2022 0451        Component Value Date/Time    CALCIUM 8 9 10/22/2022 0451    ALKPHOS 56 10/22/2022 0451    AST 17 10/22/2022 0451    ALT 29 10/22/2022 0451            No results found for: CHOL  No results found for: HDL  No results found for: LDLCALC  No results found for: TRIG  No results found for: CHOLHDL    Imaging: No results found  ECG:        Review of Systems   Constitutional: Negative  HENT: Negative  Eyes: Negative  Cardiovascular: Negative  Respiratory: Negative  Endocrine: Negative  Hematologic/Lymphatic: Negative  Skin: Negative  Musculoskeletal: Negative  Gastrointestinal: Negative  Genitourinary: Negative  Neurological: Negative  Psychiatric/Behavioral: Negative  All other systems reviewed and are negative  Vitals:    11/22/22 1257   BP: 160/98   Pulse: (!) 126     Vitals:    11/22/22 1257   Weight: 91 2 kg (201 lb)     Height: 5' 11" (180 3 cm)   Body mass index is 28 03 kg/m²  Physical Exam:  Vital signs reviewed  General appearance:  Appears stated age, alert, well appearing and in no distress  HEENT:  PERRLA, EOMI, no scleral icterus, no conjunctival pallor  NECK:  Supple, No elevated JVP, no thyromegaly, no carotid bruits, no JVD  HEART:  Regular rate and rhythm, normal S1/S2, no S3/S4, no murmur or rub, PMI nondisplaced  LUNGS:  Clear to auscultation bilaterally, no wheezes rales or rhonchi  ABDOMEN:  Soft, non-tender, positive bowel sounds, no rebound or guarding, no organomegaly   EXTREMITIES:  Normal range of motion  No clubbing or cyanosis   No edema  VASCULAR:  Normal pedal pulses   SKIN: No lesions or rashes on exposed skin  NEURO:  CN II-XII intact, no focal deficits

## 2022-11-25 ENCOUNTER — VBI (OUTPATIENT)
Dept: ADMINISTRATIVE | Facility: OTHER | Age: 57
End: 2022-11-25

## 2022-11-25 NOTE — TELEPHONE ENCOUNTER
11/25/22 1:03 PM     VB [employer] SmartForm used to document caregap status      Dafne Hall MA Hysterectomy with no documentation

## 2022-11-30 ENCOUNTER — TELEPHONE (OUTPATIENT)
Dept: ENDOCRINOLOGY | Facility: CLINIC | Age: 57
End: 2022-11-30

## 2022-11-30 NOTE — TELEPHONE ENCOUNTER
Patient called and said that the trulicity 0 5 ml  is giving her bad gas and diarrhea    she cant take ozepmic or Jardiance and she ended up in hospital after taking them

## 2022-12-01 ENCOUNTER — TELEPHONE (OUTPATIENT)
Dept: ENDOCRINOLOGY | Facility: CLINIC | Age: 57
End: 2022-12-01

## 2022-12-01 DIAGNOSIS — E11.9 TYPE 2 DIABETES MELLITUS WITHOUT COMPLICATION, WITHOUT LONG-TERM CURRENT USE OF INSULIN (HCC): Primary | ICD-10-CM

## 2022-12-01 RX ORDER — DULAGLUTIDE 0.75 MG/.5ML
0.75 INJECTION, SOLUTION SUBCUTANEOUS WEEKLY
Start: 2022-12-01 | End: 2022-12-02 | Stop reason: SDUPTHER

## 2022-12-02 DIAGNOSIS — E11.9 TYPE 2 DIABETES MELLITUS WITHOUT COMPLICATION, WITHOUT LONG-TERM CURRENT USE OF INSULIN (HCC): ICD-10-CM

## 2022-12-02 RX ORDER — DULAGLUTIDE 0.75 MG/.5ML
0.75 INJECTION, SOLUTION SUBCUTANEOUS WEEKLY
Qty: 2 ML | Refills: 2 | Status: SHIPPED | OUTPATIENT
Start: 2022-12-02 | End: 2023-01-01

## 2022-12-08 ENCOUNTER — VBI (OUTPATIENT)
Dept: ADMINISTRATIVE | Facility: OTHER | Age: 57
End: 2022-12-08

## 2022-12-17 DIAGNOSIS — Z00.00 HEALTHCARE MAINTENANCE: ICD-10-CM

## 2022-12-19 RX ORDER — UREA 10 %
LOTION (ML) TOPICAL
Qty: 90 TABLET | Refills: 1 | OUTPATIENT
Start: 2022-12-19

## 2022-12-20 DIAGNOSIS — E11.9 TYPE 2 DIABETES MELLITUS WITHOUT COMPLICATION, WITHOUT LONG-TERM CURRENT USE OF INSULIN (HCC): ICD-10-CM

## 2022-12-20 DIAGNOSIS — Z00.00 HEALTHCARE MAINTENANCE: ICD-10-CM

## 2022-12-20 RX ORDER — DULAGLUTIDE 0.75 MG/.5ML
0.75 INJECTION, SOLUTION SUBCUTANEOUS WEEKLY
Qty: 3 ML | Refills: 2 | Status: SHIPPED | OUTPATIENT
Start: 2022-12-20 | End: 2023-01-19

## 2022-12-20 RX ORDER — UREA 10 %
400 LOTION (ML) TOPICAL DAILY
Qty: 90 TABLET | Refills: 1 | Status: SHIPPED | OUTPATIENT
Start: 2022-12-20

## 2022-12-21 PROBLEM — E11.10 DIABETIC KETOACIDOSIS WITHOUT COMA ASSOCIATED WITH TYPE 2 DIABETES MELLITUS (HCC): Status: RESOLVED | Noted: 2022-10-21 | Resolved: 2022-12-21

## 2023-01-10 ENCOUNTER — VBI (OUTPATIENT)
Dept: ADMINISTRATIVE | Facility: OTHER | Age: 58
End: 2023-01-10

## 2023-01-17 ENCOUNTER — HOSPITAL ENCOUNTER (OUTPATIENT)
Dept: NON INVASIVE DIAGNOSTICS | Facility: HOSPITAL | Age: 58
Discharge: HOME | End: 2023-01-17
Attending: INTERNAL MEDICINE

## 2023-01-17 VITALS
DIASTOLIC BLOOD PRESSURE: 80 MMHG | SYSTOLIC BLOOD PRESSURE: 150 MMHG | HEIGHT: 71 IN | WEIGHT: 201 LBS | BODY MASS INDEX: 28.14 KG/M2 | HEART RATE: 80 BPM

## 2023-01-17 DIAGNOSIS — I10 ESSENTIAL HYPERTENSION: ICD-10-CM

## 2023-01-17 DIAGNOSIS — I25.10 CORONARY ARTERY CALCIFICATION SEEN ON CAT SCAN: ICD-10-CM

## 2023-01-17 DIAGNOSIS — E78.2 MIXED HYPERLIPIDEMIA: ICD-10-CM

## 2023-01-17 DIAGNOSIS — R77.8 ELEVATED TROPONIN LEVEL: ICD-10-CM

## 2023-01-17 DIAGNOSIS — E78.00 HYPERCHOLESTEROLEMIA: ICD-10-CM

## 2023-01-17 LAB
AORTIC ROOT: 2.7 CM
APICAL FOUR CHAMBER EJECTION FRACTION: 58 %
AV PEAK GRADIENT: 10 MMHG
FRACTIONAL SHORTENING: 35 % (ref 28–44)
INTERVENTRICULAR SEPTUM IN DIASTOLE (PARASTERNAL SHORT AXIS VIEW): 1 CM
INTERVENTRICULAR SEPTUM: 1 CM (ref 0.6–1.1)
LEFT ATRIUM AREA SYSTOLE SINGLE PLANE A4C: 16.7 CM2
LEFT ATRIUM SIZE: 3.6 CM
LEFT INTERNAL DIMENSION IN SYSTOLE: 2.8 CM (ref 2.1–4)
LEFT VENTRICULAR INTERNAL DIMENSION IN DIASTOLE: 4.3 CM (ref 3.5–6)
LEFT VENTRICULAR POSTERIOR WALL IN END DIASTOLE: 1 CM
LEFT VENTRICULAR STROKE VOLUME: 55 ML
LVSV (TEICH): 55 ML
RA PRESSURE ESTIMATED: 8 MMHG
RIGHT ATRIUM AREA SYSTOLE A4C: 18.8 CM2
RIGHT VENTRICLE ID DIMENSION: 2.8 CM
RV PSP: 34 MMHG
SL CV LV EF: 65
SL CV PED ECHO LEFT VENTRICLE DIASTOLIC VOLUME (MOD BIPLANE) 2D: 85 ML
SL CV PED ECHO LEFT VENTRICLE SYSTOLIC VOLUME (MOD BIPLANE) 2D: 30 ML
TR MAX PG: 26 MMHG
TR PEAK VELOCITY: 2.6 M/S
TRICUSPID ANNULAR PLANE SYSTOLIC EXCURSION: 2.1 CM
TRICUSPID VALVE PEAK REGURGITATION VELOCITY: 2.57 M/S

## 2023-02-01 ENCOUNTER — APPOINTMENT (OUTPATIENT)
Dept: LAB | Facility: CLINIC | Age: 58
End: 2023-02-01

## 2023-02-01 DIAGNOSIS — D17.9 MYELOLIPOMA: ICD-10-CM

## 2023-02-01 DIAGNOSIS — E11.9 TYPE 2 DIABETES MELLITUS WITHOUT COMPLICATION, WITHOUT LONG-TERM CURRENT USE OF INSULIN (HCC): ICD-10-CM

## 2023-02-01 LAB
ANION GAP SERPL CALCULATED.3IONS-SCNC: 6 MMOL/L (ref 4–13)
BUN SERPL-MCNC: 17 MG/DL (ref 5–25)
CALCIUM SERPL-MCNC: 9.7 MG/DL (ref 8.3–10.1)
CHLORIDE SERPL-SCNC: 109 MMOL/L (ref 96–108)
CHOLEST SERPL-MCNC: 149 MG/DL
CO2 SERPL-SCNC: 25 MMOL/L (ref 21–32)
CORTIS AM PEAK SERPL-MCNC: 28 UG/DL (ref 4.2–22.4)
CREAT SERPL-MCNC: 0.67 MG/DL (ref 0.6–1.3)
CREAT UR-MCNC: 44.6 MG/DL
EST. AVERAGE GLUCOSE BLD GHB EST-MCNC: 126 MG/DL
GFR SERPL CREATININE-BSD FRML MDRD: 97 ML/MIN/1.73SQ M
GLUCOSE P FAST SERPL-MCNC: 125 MG/DL (ref 65–99)
HBA1C MFR BLD: 6 %
HDLC SERPL-MCNC: 64 MG/DL
LDLC SERPL CALC-MCNC: 61 MG/DL (ref 0–100)
MICROALBUMIN UR-MCNC: 5.8 MG/L (ref 0–20)
MICROALBUMIN/CREAT 24H UR: 13 MG/G CREATININE (ref 0–30)
NONHDLC SERPL-MCNC: 85 MG/DL
POTASSIUM SERPL-SCNC: 4.3 MMOL/L (ref 3.5–5.3)
SODIUM SERPL-SCNC: 140 MMOL/L (ref 135–147)
TRIGL SERPL-MCNC: 122 MG/DL

## 2023-02-02 LAB
ACTH PLAS-MCNC: 56.8 PG/ML (ref 7.2–63.3)
C PEPTIDE SERPL-MCNC: 3.3 NG/ML (ref 1.1–4.4)
DHEA-S SERPL-MCNC: 53 UG/DL (ref 29.4–220.5)

## 2023-02-07 LAB
ALDOST SERPL-MCNC: 3 NG/DL (ref 0–30)
ALDOST/RENIN PLAS-RTO: NORMAL {RATIO}
RENIN PLAS-CCNC: NORMAL NG/ML/HR

## 2023-02-15 DIAGNOSIS — I10 HYPERTENSION, UNSPECIFIED TYPE: ICD-10-CM

## 2023-02-15 DIAGNOSIS — E78.00 HYPERCHOLESTEROLEMIA: ICD-10-CM

## 2023-02-15 DIAGNOSIS — E78.2 MIXED HYPERLIPIDEMIA: ICD-10-CM

## 2023-02-15 DIAGNOSIS — E11.9 TYPE 2 DIABETES MELLITUS WITHOUT COMPLICATION, WITHOUT LONG-TERM CURRENT USE OF INSULIN (HCC): ICD-10-CM

## 2023-02-15 DIAGNOSIS — I10 ESSENTIAL HYPERTENSION: ICD-10-CM

## 2023-02-15 DIAGNOSIS — Z00.00 HEALTHCARE MAINTENANCE: ICD-10-CM

## 2023-02-15 DIAGNOSIS — R77.8 ELEVATED TROPONIN LEVEL: ICD-10-CM

## 2023-02-15 DIAGNOSIS — I25.10 CORONARY ARTERY CALCIFICATION SEEN ON CAT SCAN: ICD-10-CM

## 2023-02-15 RX ORDER — UREA 10 %
400 LOTION (ML) TOPICAL DAILY
Qty: 90 TABLET | Refills: 1 | Status: SHIPPED | OUTPATIENT
Start: 2023-02-15

## 2023-02-15 RX ORDER — LISINOPRIL 40 MG/1
40 TABLET ORAL DAILY
Qty: 90 TABLET | Refills: 1 | Status: SHIPPED | OUTPATIENT
Start: 2023-02-15 | End: 2023-02-16 | Stop reason: ALTCHOICE

## 2023-02-15 RX ORDER — ASPIRIN 81 MG/1
81 TABLET ORAL DAILY
Qty: 90 TABLET | Refills: 1 | Status: SHIPPED | OUTPATIENT
Start: 2023-02-15

## 2023-02-15 RX ORDER — ASPIRIN 81 MG/1
TABLET, FILM COATED ORAL
Qty: 90 TABLET | Refills: 1 | OUTPATIENT
Start: 2023-02-15

## 2023-02-15 RX ORDER — AMLODIPINE BESYLATE 5 MG/1
5 TABLET ORAL DAILY
Qty: 90 TABLET | Refills: 1 | Status: SHIPPED | OUTPATIENT
Start: 2023-02-15

## 2023-02-15 RX ORDER — AMLODIPINE BESYLATE 5 MG/1
TABLET ORAL
Qty: 90 TABLET | Refills: 1 | OUTPATIENT
Start: 2023-02-15

## 2023-02-15 RX ORDER — LISINOPRIL 40 MG/1
TABLET ORAL
Qty: 90 TABLET | Refills: 1 | OUTPATIENT
Start: 2023-02-15

## 2023-02-16 ENCOUNTER — OFFICE VISIT (OUTPATIENT)
Dept: ENDOCRINOLOGY | Facility: CLINIC | Age: 58
End: 2023-02-16

## 2023-02-16 VITALS
DIASTOLIC BLOOD PRESSURE: 92 MMHG | HEIGHT: 71 IN | WEIGHT: 199 LBS | HEART RATE: 94 BPM | SYSTOLIC BLOOD PRESSURE: 172 MMHG | BODY MASS INDEX: 27.86 KG/M2

## 2023-02-16 DIAGNOSIS — E11.65 TYPE 2 DIABETES MELLITUS WITH HYPERGLYCEMIA, WITHOUT LONG-TERM CURRENT USE OF INSULIN (HCC): ICD-10-CM

## 2023-02-16 DIAGNOSIS — I10 PRIMARY HYPERTENSION: ICD-10-CM

## 2023-02-16 DIAGNOSIS — D17.9 MYELOLIPOMA: ICD-10-CM

## 2023-02-16 DIAGNOSIS — E11.9 TYPE 2 DIABETES MELLITUS WITHOUT COMPLICATION, WITHOUT LONG-TERM CURRENT USE OF INSULIN (HCC): Primary | ICD-10-CM

## 2023-02-16 RX ORDER — VALSARTAN AND HYDROCHLOROTHIAZIDE 160; 12.5 MG/1; MG/1
1 TABLET, FILM COATED ORAL DAILY
Qty: 30 TABLET | Refills: 3 | Status: SHIPPED | OUTPATIENT
Start: 2023-02-16

## 2023-02-16 NOTE — PROGRESS NOTES
Celsa Morelos 61 y o  female MRN: [id number]    Encounter: [id number]      Assessment/Plan     1  Type 2 DM c/b euglycemic DKA - excellent improvement  Anjelica Roland will continue her current anti-diabetic treatment plan with metformin 1g bid and trulicity 9 68 mg weekly  She will let me know whether any of her side effects become more bothersome  She is encouraged to continue healthy diet and lifestyle  Will follow labs for BMP and A1c prior to next visit  Patient will need a diabetic foot examination performed at next office visit  2  Hypertension - uncontrolled  Continue amlodipine, stop lisinopril  Will start losartan-hctz 160-12 5 mg daily  Will follow up labs for monitoring of sodium and potassium  Patient encouraged to perform home ambulatory blood pressure monitoring  3  Adrenal myelolipoma - adrenal myelolipoma's contain no medullary or cortical tissue and are not hormonally active, although coexisting hyperfunctioning adrenocortical adenomas can be observed, with exception of pheochromocytoma  Aldosterone level <5, no renin available on testing  AM cortisol level is elevated  As patient is making clinical improvements, will plan to monitor  However, will consider additional cortisol testing (e g , DST, late night salivary, and/or 24h UFC) in the future as clinically indicated         Problem List Items Addressed This Visit        Endocrine    Type 2 diabetes mellitus with hyperglycemia, without long-term current use of insulin ([facility] [address] )   Other Visit Diagnoses     Type 2 diabetes mellitus without complication, without long-term current use of insulin [facility] )    -  Primary    Relevant Orders    Basic metabolic panel Lab Collect    HEMOGLOBIN A1C W/ EAG ESTIMATION Lab Collect    Myelolipoma        Primary hypertension        Relevant Medications    valsartan-hydrochlorothiazide (DIOVAN-HCT) 160-12 5 MG per tablet        RTC 4-months    CC: Diabetes    History of Present Illness     HPI:    Anjelica Roland returns today for follow up of diabetes  Perry Garsia is doing very well  She has been making changes to diet and lifestyle, which have contributed to >20 lb weight loss in past year  She is taking metformin 1g bid, and trulicity 1 27 mg weekly  She is happy with her experience with these treatments, but is having some gas/bloating which she attributes to trulicity  She is not interested in stopping therapy, but will try Gas-X for symptom relief  For hypertension she takes amlodipine 5 mg daily and lisinopril 40 mg daily  She reports frustration with blood pressure  For hyperlipidemia she takes lipitor 20 mg daily  Review of Systems   Constitutional: Negative for diaphoresis and unexpected weight change  HENT: Negative for trouble swallowing  Respiratory: Negative for shortness of breath  Cardiovascular: Negative for chest pain and palpitations  Gastrointestinal: Negative for nausea and vomiting  Endocrine: Negative for polydipsia and polyuria  Neurological: Negative for headaches  Psychiatric/Behavioral: Negative for agitation and behavioral problems  All other systems reviewed and are negative        Historical Information   Past Medical History:   Diagnosis Date   • Diabetes mellitus ([facility] [address] )    • High cholesterol    • Hypertension      Past Surgical History:   Procedure Laterality Date   • HYSTERECTOMY       Social History   Social History     Substance and Sexual Activity   Alcohol Use Never     Social History     Substance and Sexual Activity   Drug Use Never     Social History     Tobacco Use   Smoking Status Former   Smokeless Tobacco Never     Family History:   Family History   Problem Relation Age of Onset   • Diabetes Mother    • Diabetes Father        Meds/Allergies   Current Outpatient Medications   Medication Sig Dispense Refill   • amLODIPine (NORVASC) 5 mg tablet Take 1 tablet (5 mg total) by mouth daily 90 tablet 1   • aspirin (Adult Aspirin Regimen) 81 mg EC tablet Take 1 tablet (81 mg total) by mouth daily 90 tablet 1   • atorvastatin (LIPITOR) 20 mg tablet Take 1 tablet (20 mg total) by mouth daily 90 tablet 3   • Dulaglutide (Trulicity) 6 71 CU/4 9UC SOPN Inject 0 5 mL (0 75 mg total) under the skin once a week 3 mL 2   • folic acid (FOLVITE) 633 MCG tablet Take 0 5 tablets (400 mcg total) by mouth daily 90 tablet 1   • glucose blood (Accu-Chek Guide) test strip Test twice daily for fluctuating blood sugars 100 strip 3   • metFORMIN (GLUCOPHAGE) 1000 MG tablet Take 1 tablet (1,000 mg total) by mouth 2 (two) times a day with meals 180 tablet 1   • valsartan-hydrochlorothiazide (DIOVAN-HCT) 160-12 5 MG per tablet Take 1 tablet by mouth daily 30 tablet 3     No current facility-administered medications for this visit  Allergies   Allergen Reactions   • Jardiance [Empagliflozin] GI Intolerance   • Ozempic (0 25 Or 0 5 Mg-Dose) [Semaglutide(0 25 Or 0 5mg-Dos)] GI Intolerance       Objective   Vitals: Blood pressure (!) 172/92, pulse 94, height 5' 11" (1 803 m), weight 90 3 kg (199 lb)  Physical Exam  Vitals reviewed  Constitutional:       General: She is not in acute distress  Appearance: Normal appearance  HENT:      Head: Normocephalic and atraumatic  Nose: Nose normal    Eyes:      General: No scleral icterus  Conjunctiva/sclera: Conjunctivae normal    Cardiovascular:      Rate and Rhythm: Normal rate and regular rhythm  Pulmonary:      Effort: Pulmonary effort is normal  No respiratory distress  Abdominal:      Palpations: Abdomen is soft  Musculoskeletal:      Cervical back: Normal range of motion  Skin:     General: Skin is warm and dry  Neurological:      General: No focal deficit present  Mental Status: She is alert  Psychiatric:         Mood and Affect: Mood normal          Behavior: Behavior normal          The history was obtained from the review of the chart, patient      Lab Results:   Lab Results   Component Value Date/Time    Hemoglobin A1C 6 0 (H) 02/01/2023 07:06 AM    Hemoglobin A1C 7 3 (A) 09/16/2022 10:09 AM    Hemoglobin A1C 7 3 (A) 03/18/2022 09:15 AM    WBC 8 10 10/22/2022 04:51 AM    WBC 10 44 (H) 10/21/2022 07:17 AM    Hemoglobin 14 2 10/22/2022 04:51 AM    Hemoglobin 16 4 (H) 10/21/2022 07:17 AM    Hematocrit 42 8 10/22/2022 04:51 AM    Hematocrit 49 2 (H) 10/21/2022 07:17 AM    MCV 97 10/22/2022 04:51 AM    MCV 97 10/21/2022 07:17 AM    Platelets 028 89/01/1506 04:51 AM    Platelets 513 68/50/7155 07:17 AM    BUN 17 02/01/2023 07:06 AM    BUN 14 10/22/2022 04:51 AM    BUN 14 10/21/2022 07:37 PM    Potassium 4 3 02/01/2023 07:06 AM    Potassium 3 9 10/22/2022 04:51 AM    Potassium 3 7 10/21/2022 07:37 PM    Chloride 109 (H) 02/01/2023 07:06 AM    Chloride 108 10/22/2022 04:51 AM    Chloride 106 10/21/2022 07:37 PM    CO2 25 02/01/2023 07:06 AM    CO2 22 10/22/2022 04:51 AM    CO2 20 (L) 10/21/2022 07:37 PM    Creatinine 0 67 02/01/2023 07:06 AM    Creatinine 0 66 10/22/2022 04:51 AM    Creatinine 0 78 10/21/2022 07:37 PM    AST 17 10/22/2022 04:51 AM    AST 16 10/21/2022 07:17 AM    ALT 29 10/22/2022 04:51 AM    ALT 31 10/21/2022 07:17 AM    Albumin 3 7 10/22/2022 04:51 AM    Albumin 4 7 10/21/2022 07:17 AM    HDL, Direct 64 02/01/2023 07:06 AM    Triglycerides 122 02/01/2023 07:06 AM     Component      Latest Ref Rng & Units 2/1/2023   Sodium      135 - 147 mmol/L 140   Potassium      3 5 - 5 3 mmol/L 4 3   Chloride      96 - 108 mmol/L 109 (H)   CO2      21 - 32 mmol/L 25   Anion Gap      4 - 13 mmol/L 6   BUN      5 - 25 mg/dL 17   Creatinine      0 60 - 1 30 mg/dL 0 67   GLUCOSE FASTING      65 - 99 mg/dL 125 (H)   Calcium      8 3 - 10 1 mg/dL 9 7   eGFR      ml/min/1 73sq m 97   Cholesterol      See Comment mg/dL 149   Triglycerides      See Comment mg/dL 122   HDL      >=50 mg/dL 64   LDL Calculated      0 - 100 mg/dL 61   Non-HDL Cholesterol      mg/dl 85   EXT Creatinine Urine      mg/dL 44 6   MICROALBUM ,U,RANDOM      0 0 - 20 0 mg/L 5 8   MICROALBUMIN/CREATININE RATIO      0 - 30 mg/g creatinine 13   Renin      ng/mL/hr COMMENT   Aldosterone      0 0 - 30 0 ng/dL 3 0   ZARI/PRA RATIO       COMMENT   Hemoglobin A1C      Normal 3 8-5 6%; PreDiabetic 5 7-6 4%; Diabetic >=6 5%; Glycemic control for adults with diabetes <7 0% % 6 0 (H)   eAG, EST AVG Glucose      mg/dl 126   Cortisol - AM      4 2 - 22 4 ug/dL 28 0 (H)   ACTH      7 2 - 63 3 pg/mL 56 8   DHEA-SO4      29 4 - 220 5 ug/dL 53 0   C-PEPTIDE      1 1 - 4 4 ng/mL 3 3       PRESENTING LABS TO [facility]      Latest Ref Rng & Units 10/21/2022   Sodium      135 - 147 mmol/L 138   Potassium      3 5 - 5 3 mmol/L 3 7   Chloride      96 - 108 mmol/L 101   CO2      21 - 32 mmol/L 19 (L)   Anion Gap      4 - 13 mmol/L 18 (H)   BUN      5 - 25 mg/dL 20   Creatinine      0 60 - 1 30 mg/dL 0 68   Glucose, Random      65 - 140 mg/dL 163 (H)   Calcium      8 3 - 10 1 mg/dL 10 0   AST      5 - 45 U/L 16   ALT      12 - 78 U/L 31   Alkaline Phosphatase      46 - 116 U/L 68   Total Protein      6 4 - 8 4 g/dL 8 1   Albumin      3 5 - 5 0 g/dL 4 7   TOTAL BILIRUBIN      0 20 - 1 00 mg/dL 0 43   eGFR      ml/min/1 73sq m 96   pH, Frandy      7 300 - 7 400 7 328   pCO2, Frandy      42 0 - 50 0 mm Hg 30 2 (L)   pO2, Frandy      35 0 - 45 0 mm Hg 56 9 (H)   HCO3, Frandy      24 - 30 mmol/L 15 5 (L)   Base Excess, Frandy      mmol/L -8 9   O2 Content, Frandy      ml/dL 19 7   O2 HGB, VENOUS      60 0 - 80 0 % 86 5 (H)   LACTIC ACID      0 5 - 2 0 mmol/L 1 4   BETA-HYDROXYBUTYRATE      <0 6 mmol/L 5 0 (H)         Imaging Studies: I have personally reviewed pertinent reports  10/21/2022    CT ABDOMEN AND PELVIS WITH IV CONTRAST     INDICATION:   Epigastric pain  recurrent epigastric to RUQ abd pain over past 3-4 wk  +RUQ TTP  hysterectomy        COMPARISON:  CT 2/10/2022      TECHNIQUE:  CT examination of the abdomen and pelvis was performed   Axial, sagittal, and coronal 2D reformatted images were created from the source data and submitted for interpretation      Radiation dose length product (DLP) for this visit:  1021 67 mGy-cm   This examination, like all CT scans performed in the [facility], was performed utilizing techniques to minimize radiation dose exposure, including the use of   iterative reconstruction and automated exposure control      IV Contrast:  100 mL of iohexol (OMNIPAQUE)  Enteric Contrast:  Enteric contrast was not administered      FINDINGS:     ABDOMEN     LOWER CHEST:  No clinically significant abnormality identified in the visualized lower chest      LIVER/BILIARY TREE:  Unchanged hepatomegaly with fatty infiltration  No CT evidence of suspicious hepatic mass  Normal hepatic contours  No biliary dilatation      GALLBLADDER:  No calcified gallstones  No pericholecystic inflammatory change      SPLEEN:  Unremarkable      PANCREAS:  Unremarkable      ADRENAL GLANDS:  Unchanged 2 4 x 2 2 cm fat density nodule in the right adrenal gland compatible with myelolipoma      KIDNEYS/URETERS:  Unremarkable  No hydronephrosis      STOMACH AND BOWEL:  There is colonic diverticulosis without evidence of acute diverticulitis      APPENDIX:  No findings to suggest appendicitis      ABDOMINOPELVIC CAVITY:  No ascites  No pneumoperitoneum  No lymphadenopathy      VESSELS:  Atherosclerotic changes are present  No evidence of aneurysm      PELVIS     REPRODUCTIVE ORGANS:  Surgical changes of prior hysterectomy      URINARY BLADDER:  Unremarkable      ABDOMINAL WALL/INGUINAL REGIONS:  There is a small fat-containing umbilical hernia, unchanged from previous examination      OSSEOUS STRUCTURES:  No acute fracture or destructive osseous lesion      IMPRESSION:     No acute pathology      Colonic diverticulosis without diverticulitis      Hepatomegaly with fatty infiltration        Portions of the record may have been created with voice recognition software   Occasional wrong word or "sound a like" substitutions may have occurred due to the inherent limitations of voice recognition software  Read the chart carefully and recognize, using context, where substitutions have occurred

## 2023-03-14 ENCOUNTER — RA CDI HCC (OUTPATIENT)
Dept: OTHER | Facility: HOSPITAL | Age: 58
End: 2023-03-14

## 2023-03-14 NOTE — PROGRESS NOTES
[facility]  coding opportunities       Chart reviewed, no opportunity found: CHART REVIEWED, NO OPPORTUNITY FOUND        Patients Insurance        Commercial Insurance: [address] [place] [address]

## 2023-03-22 ENCOUNTER — TELEPHONE (OUTPATIENT)
Dept: ADMINISTRATIVE | Facility: OTHER | Age: 58
End: 2023-03-22

## 2023-03-22 NOTE — TELEPHONE ENCOUNTER
----- Message from [facility] sent at 3/21/2023  1:50 PM EDT -----  Regarding: Colonoscopy  Hello, our patient Pretty Sullivan has had CRC: Colonoscopy completed/performed  Please assist in updating the patient chart by pulling the Care Everywhere (CE) document   The date of service is 02/10/2020       Thank you,  [id number]

## 2023-03-22 NOTE — TELEPHONE ENCOUNTER
Upon review of the In Basket request we were able to locate, review, and update the patient chart as requested for CRC: Colonoscopy  Any additional questions or concerns should be emailed to the Practice Liaisons via the appropriate education email address, please do not reply via In Basket      Thank you  Adriana Dillard MA

## 2023-03-22 NOTE — TELEPHONE ENCOUNTER
Upon review of the In Basket request and the patient's chart, initial outreach has been made via fax to facility  Please see Contacts section for details       Thank you  Alexia Hills MA

## 2023-03-22 NOTE — LETTER
Procedure Request Form: Colonoscopy      Date Requested: 23  Patient: Aspen Sosa  Patient : 1965   Referring Provider: Adelita Trinidad PA-C        Date of Procedure ______________________________       The above patient has informed us that they have completed their   most recent Colonoscopy at your facility  Please complete   this form and attach all corresponding procedure reports/results  Comments __DOS 02/10/2020 performed by Maurice Goodwin DO at [facility] _______________________________________________________  ____________________________________________________________________  ____________________________________________________________________  ____________________________________________________________________    Facility Completing Procedure _________________________________________    Form Completed By (print name) _______________________________________      Signature __________________________________________________________      These reports are needed for  compliance  Please fax this completed form and a copy of the procedure report to our office located at [place] [address] as soon as possible to Fax [phone] jimy Calzada: Phone [phone]    We thank you for your assistance in treating our mutual patient

## 2023-03-29 DIAGNOSIS — Z12.39 ENCOUNTER FOR SCREENING FOR MALIGNANT NEOPLASM OF BREAST, UNSPECIFIED SCREENING MODALITY: ICD-10-CM

## 2023-03-29 DIAGNOSIS — R92.8 ABNORMALITY OF LEFT BREAST ON SCREENING MAMMOGRAM: Primary | ICD-10-CM

## 2023-03-30 ENCOUNTER — OFFICE VISIT (OUTPATIENT)
Dept: FAMILY MEDICINE CLINIC | Facility: CLINIC | Age: 58
End: 2023-03-30

## 2023-03-30 VITALS
OXYGEN SATURATION: 98 % | DIASTOLIC BLOOD PRESSURE: 88 MMHG | TEMPERATURE: 99.1 F | BODY MASS INDEX: 27.47 KG/M2 | WEIGHT: 196.2 LBS | SYSTOLIC BLOOD PRESSURE: 148 MMHG | HEART RATE: 107 BPM | HEIGHT: 71 IN

## 2023-03-30 DIAGNOSIS — E11.65 TYPE 2 DIABETES MELLITUS WITH HYPERGLYCEMIA, WITHOUT LONG-TERM CURRENT USE OF INSULIN (HCC): ICD-10-CM

## 2023-03-30 DIAGNOSIS — E11.9 TYPE 2 DIABETES MELLITUS WITHOUT COMPLICATION, WITHOUT LONG-TERM CURRENT USE OF INSULIN (HCC): ICD-10-CM

## 2023-03-30 DIAGNOSIS — Z00.00 WELL ADULT EXAM: Primary | ICD-10-CM

## 2023-03-30 DIAGNOSIS — I10 PRIMARY HYPERTENSION: ICD-10-CM

## 2023-03-30 DIAGNOSIS — E78.00 HYPERCHOLESTEROLEMIA: ICD-10-CM

## 2023-03-30 DIAGNOSIS — Z00.00 HEALTHCARE MAINTENANCE: ICD-10-CM

## 2023-03-30 RX ORDER — ASPIRIN 81 MG/1
81 TABLET ORAL DAILY
Qty: 90 TABLET | Refills: 1 | Status: SHIPPED | OUTPATIENT
Start: 2023-03-30

## 2023-03-30 RX ORDER — VALSARTAN AND HYDROCHLOROTHIAZIDE 160; 12.5 MG/1; MG/1
1 TABLET, FILM COATED ORAL DAILY
Qty: 90 TABLET | Refills: 1 | Status: SHIPPED | OUTPATIENT
Start: 2023-03-30

## 2023-03-30 RX ORDER — UREA 10 %
400 LOTION (ML) TOPICAL DAILY
Qty: 90 TABLET | Refills: 1 | Status: SHIPPED | OUTPATIENT
Start: 2023-03-30

## 2023-03-30 RX ORDER — AMLODIPINE BESYLATE 5 MG/1
5 TABLET ORAL DAILY
Qty: 90 TABLET | Refills: 1 | Status: SHIPPED | OUTPATIENT
Start: 2023-03-30

## 2023-03-30 NOTE — ASSESSMENT & PLAN NOTE
Slightly elevated in office, pt with recent abnormal mammogram, she is scared/nervous  Will continue to monitor at home, continue same medication at this time

## 2023-03-30 NOTE — PROGRESS NOTES
[facility]    NAME: Brodie Gale  AGE: 61 y o  SEX: female  : 1965     DATE: 3/30/2023     Assessment and Plan:     Problem List Items Addressed This Visit        Endocrine    Type 2 diabetes mellitus without complication, without long-term current use of insulin ([address] )       Seeing endocrinology, well-controlled at this time  Lab Results   Component Value Date    HGBA1C 6 0 (H) 2023            Relevant Medications    metFORMIN (GLUCOPHAGE) 1000 MG tablet       Cardiovascular and Mediastinum    Primary hypertension     Slightly elevated in office, pt with recent abnormal mammogram, she is scared/nervous  Will continue to monitor at home, continue same medication at this time  Relevant Medications    amLODIPine (NORVASC) 5 mg tablet    valsartan-hydrochlorothiazide (DIOVAN-HCT) 160-12 5 MG per tablet       Other    Hypercholesterolemia     Stable, continue Lipitor  Healthcare maintenance - Primary     Recent labs reviewed, colon and breast CA screening UTD  Relevant Medications    aspirin (Adult Aspirin Regimen) 81 mg EC tablet    folic acid (FOLVITE) 929 MCG tablet    BMI 27 0-27 9,adult     Continue to maintain diet and exercise plan            Immunizations and preventive care screenings were discussed with patient today  Appropriate education was printed on patient's after visit summary  Counseling:  Exercise: the importance of regular exercise/physical activity was discussed  Recommend exercise 3-5 times per week for at least 30 minutes  BMI Counseling: Body mass index is 27 36 kg/m²   The BMI is above normal  Nutrition recommendations include decreasing portion sizes, encouraging healthy choices of fruits and vegetables, decreasing fast food intake, consuming healthier snacks, limiting drinks that contain sugar, moderation in carbohydrate intake, increasing intake of lean protein, reducing intake of saturated and trans fat and reducing intake of cholesterol  Exercise recommendations include moderate physical activity 150 minutes/week  Rationale for BMI follow-up plan is due to patient being overweight or obese  Depression Screening and Follow-up Plan: Patient was screened for depression during today's encounter  They screened negative with a PHQ-2 score of 0  Return in about 6 months (around 9/30/2023)  Chief Complaint:     Chief Complaint   Patient presents with   • Annual Exam      History of Present Illness:     Adult Annual Physical   Patient here for a comprehensive physical exam  The patient reports problems - pt with recent abnormal L mammography, she is scheduled for additional imaging on L breast     Diet and Physical Activity  Diet/Nutrition: well balanced diet  Exercise: moderate cardiovascular exercise  Depression Screening  PHQ-2/9 Depression Screening    Little interest or pleasure in doing things: 0 - not at all  Feeling down, depressed, or hopeless: 0 - not at all  PHQ-2 Score: 0  PHQ-2 Interpretation: Negative depression screen       General Health  Sleep: sleeps well  Hearing: normal - bilateral   Vision: goes for regular eye exams and wears glasses  Dental: brushes teeth once daily  Review of Systems:     Review of Systems   Constitutional: Negative for activity change, appetite change, chills, diaphoresis, fatigue, fever and unexpected weight change  HENT: Negative for congestion, ear pain, postnasal drip, rhinorrhea, sinus pressure, sinus pain, sneezing, sore throat, tinnitus and voice change  Eyes: Negative for pain, redness and visual disturbance  Respiratory: Negative for cough, chest tightness, shortness of breath and wheezing  Cardiovascular: Negative for chest pain, palpitations and leg swelling  Gastrointestinal: Negative for abdominal pain, blood in stool, constipation, diarrhea, nausea and vomiting  Genitourinary: Negative for difficulty urinating, dysuria, frequency, hematuria and urgency  Musculoskeletal: Negative for arthralgias, back pain, gait problem, joint swelling, myalgias, neck pain and neck stiffness  Skin: Negative for color change, pallor, rash and wound  Neurological: Negative for dizziness, tremors, weakness, light-headedness and headaches  Psychiatric/Behavioral: Negative for dysphoric mood, self-injury, sleep disturbance and suicidal ideas  The patient is not nervous/anxious  Past Medical History:     Past Medical History:   Diagnosis Date   • Diabetes mellitus ([facility] )    • High cholesterol    • Hypertension       Past Surgical History:     Past Surgical History:   Procedure Laterality Date   • HYSTERECTOMY        Social History:     Social History     Socioeconomic History   • Marital status: /Civil Union     Spouse name: None   • Number of children: None   • Years of education: None   • Highest education level: None   Occupational History   • None   Tobacco Use   • Smoking status: Former   • Smokeless tobacco: Never   Vaping Use   • Vaping Use: Never used   Substance and Sexual Activity   • Alcohol use: Never   • Drug use: Never   • Sexual activity: None   Other Topics Concern   • None   Social History Narrative   • None     Social Determinants of Health     Financial Resource Strain: Low Risk    • Difficulty of Paying Living Expenses: Not hard at all   Food Insecurity: No Food Insecurity   • Worried About Running Out of Food in the Last Year: Never true   • Ran Out of Food in the Last Year: Never true   Transportation Needs: No Transportation Needs   • Lack of Transportation (Medical): No   • Lack of Transportation (Non-Medical):  No   Physical Activity: Not on file   Stress: Not on file   Social Connections: Not on file   Intimate Partner Violence: Not on file   Housing Stability: Low Risk    • Unable to Pay for Housing in the Last Year: No   • Number of Places Lived in "the Last Year: 1   • Unstable Housing in the Last Year: No      Family History:     Family History   Problem Relation Age of Onset   • Diabetes Mother    • Diabetes Father       Current Medications:     Current Outpatient Medications   Medication Sig Dispense Refill   • amLODIPine (NORVASC) 5 mg tablet Take 1 tablet (5 mg total) by mouth daily 90 tablet 1   • aspirin (Adult Aspirin Regimen) 81 mg EC tablet Take 1 tablet (81 mg total) by mouth daily 90 tablet 1   • atorvastatin (LIPITOR) 20 mg tablet Take 1 tablet (20 mg total) by mouth daily 90 tablet 3   • dulaglutide (Trulicity) 5 43 QC/1 8WB injection Inject 0 5 mL (0 75 mg total) under the skin once a week 2 mL 3   • folic acid (FOLVITE) 021 MCG tablet Take 0 5 tablets (400 mcg total) by mouth daily 90 tablet 1   • glucose blood (Accu-Chek Guide) test strip Test twice daily for fluctuating blood sugars 100 strip 3   • metFORMIN (GLUCOPHAGE) 1000 MG tablet Take 1 tablet (1,000 mg total) by mouth 2 (two) times a day with meals 180 tablet 1   • valsartan-hydrochlorothiazide (DIOVAN-HCT) 160-12 5 MG per tablet Take 1 tablet by mouth daily 90 tablet 1     No current facility-administered medications for this visit  Allergies: Allergies   Allergen Reactions   • Jardiance [Empagliflozin] GI Intolerance   • Ozempic (0 25 Or 0 5 Mg-Dose) [Semaglutide(0 25 Or 0 5mg-Dos)] GI Intolerance      Physical Exam:     /88   Pulse (!) 107   Temp 99 1 °F (37 3 °C)   Ht 5' 11\" (1 803 m)   Wt 89 kg (196 lb 3 2 oz)   SpO2 98%   BMI 27 36 kg/m²     Physical Exam  Vitals and nursing note reviewed  Constitutional:       General: She is not in acute distress  Appearance: She is well-developed  HENT:      Head: Normocephalic and atraumatic  Eyes:      Conjunctiva/sclera: Conjunctivae normal    Cardiovascular:      Rate and Rhythm: Normal rate and regular rhythm        Pulses: no weak pulses          Dorsalis pedis pulses are 2+ on the right side and 2+ on the " left side  Posterior tibial pulses are 2+ on the right side and 2+ on the left side  Heart sounds: No murmur heard  Pulmonary:      Effort: Pulmonary effort is normal  No respiratory distress  Breath sounds: Normal breath sounds  Abdominal:      Palpations: Abdomen is soft  Tenderness: There is no abdominal tenderness  Musculoskeletal:         General: No swelling  Cervical back: Neck supple  Feet:      Right foot:      Skin integrity: No ulcer, skin breakdown, erythema, warmth, callus or dry skin  Left foot:      Skin integrity: No ulcer, skin breakdown, erythema, warmth, callus or dry skin  Skin:     General: Skin is warm and dry  Capillary Refill: Capillary refill takes less than 2 seconds  Neurological:      Mental Status: She is alert  Psychiatric:         Mood and Affect: Mood normal           Patient's shoes and socks removed  Right Foot/Ankle   Right Foot Inspection  Skin Exam: skin normal and skin intact  No dry skin, no warmth, no callus, no erythema, no maceration, no abnormal color, no pre-ulcer, no ulcer and no callus  Toe Exam: ROM and strength within normal limits  Sensory   Monofilament testing: intact    Vascular  Capillary refills: < 3 seconds  The right DP pulse is 2+  The right PT pulse is 2+  Left Foot/Ankle  Left Foot Inspection  Skin Exam: skin normal and skin intact  No dry skin, no warmth, no erythema, no maceration, normal color, no pre-ulcer, no ulcer and no callus  Toe Exam: ROM and strength within normal limits  Sensory   Monofilament testing: intact    Vascular  Capillary refills: < 3 seconds  The left DP pulse is 2+  The left PT pulse is 2+       Assign Risk Category  No deformity present  No loss of protective sensation  No weak pulses  Risk: 0        Ponce Francisco PA-C  [address] [place] [address]

## 2023-03-30 NOTE — ASSESSMENT & PLAN NOTE
Seeing endocrinology, well-controlled at this time           Lab Results   Component Value Date    HGBA1C 6 0 (H) 02/01/2023

## 2023-03-31 ENCOUNTER — TELEPHONE (OUTPATIENT)
Dept: ADMINISTRATIVE | Facility: OTHER | Age: 58
End: 2023-03-31

## 2023-03-31 ENCOUNTER — OFFICE VISIT (OUTPATIENT)
Dept: URGENT CARE | Facility: CLINIC | Age: 58
End: 2023-03-31

## 2023-03-31 VITALS
TEMPERATURE: 97.9 F | SYSTOLIC BLOOD PRESSURE: 120 MMHG | OXYGEN SATURATION: 98 % | DIASTOLIC BLOOD PRESSURE: 62 MMHG | HEART RATE: 73 BPM | RESPIRATION RATE: 16 BRPM

## 2023-03-31 DIAGNOSIS — H61.23 BILATERAL IMPACTED CERUMEN: Primary | ICD-10-CM

## 2023-03-31 NOTE — PROGRESS NOTES
"  [facility]        NAME: Sharla Harding is a 61 y o  female  : 1965    MRN: [id number]  DATE: 2023  TIME: 9:27 AM    Assessment and Plan   Bilateral impacted cerumen [H61 23]  1  Bilateral impacted cerumen  Ear cerumen removal    Ambulatory Referral to Otolaryngology        L canal has a sharp curve where the earwax is located  Unable to remove wax after 3-4 bottles  Wax is too deep to safely use a curette given the inability to appreciate the L TM  Patient Instructions     Avoid Q-Tip use  Over the counter debrox drops  Follow up with ENT if symptoms persist  Follow up with PCP in 3-5 days  Proceed to  ER if symptoms worsen  Chief Complaint     Chief Complaint   Patient presents with   • Ear Fullness     Started 1 month ago, bilateral ear fullness, and blocked  OTC ear oil administered in both ears with no relief         History of Present Illness       Earache   There is pain in both ears  This is a new problem  The current episode started 1 to 4 weeks ago  There has been no fever  Associated symptoms include hearing loss  Pertinent negatives include no coughing, ear discharge, headaches, rash, rhinorrhea, sore throat or vomiting  Treatments tried: \"ear oil\"       Review of Systems   Review of Systems   Constitutional: Negative for chills and fever  HENT: Positive for hearing loss  Negative for congestion, ear discharge, ear pain, postnasal drip, rhinorrhea, sinus pressure, sinus pain, sore throat, tinnitus and trouble swallowing  Respiratory: Negative for cough  Gastrointestinal: Negative for nausea and vomiting  Musculoskeletal: Negative for myalgias  Skin: Negative for rash  Neurological: Negative for dizziness and headaches           Current Medications       Current Outpatient Medications:   •  amLODIPine (NORVASC) 5 mg tablet, Take 1 tablet (5 mg total) by mouth daily, Disp: 90 tablet, Rfl: 1  •  aspirin (Adult Aspirin Regimen) 81 mg EC tablet, Take 1 " tablet (81 mg total) by mouth daily, Disp: 90 tablet, Rfl: 1  •  atorvastatin (LIPITOR) 20 mg tablet, Take 1 tablet (20 mg total) by mouth daily, Disp: 90 tablet, Rfl: 3  •  dulaglutide (Trulicity) 3 96 MU/5 0IY injection, Inject 0 5 mL (0 75 mg total) under the skin once a week, Disp: 2 mL, Rfl: 3  •  folic acid (FOLVITE) 651 MCG tablet, Take 0 5 tablets (400 mcg total) by mouth daily, Disp: 90 tablet, Rfl: 1  •  glucose blood (Accu-Chek Guide) test strip, Test twice daily for fluctuating blood sugars, Disp: 100 strip, Rfl: 3  •  metFORMIN (GLUCOPHAGE) 1000 MG tablet, Take 1 tablet (1,000 mg total) by mouth 2 (two) times a day with meals, Disp: 180 tablet, Rfl: 1  •  valsartan-hydrochlorothiazide (DIOVAN-HCT) 160-12 5 MG per tablet, Take 1 tablet by mouth daily, Disp: 90 tablet, Rfl: 1    Current Allergies     Allergies as of 03/31/2023 - Reviewed 03/31/2023   Allergen Reaction Noted   • Jardiance [empagliflozin] GI Intolerance 10/25/2022   • Ozempic (0 25 or 0 5 mg-dose) [semaglutide(0 25 or 0 5mg-dos)] GI Intolerance 09/16/2022            The following portions of the patient's history were reviewed and updated as appropriate: allergies, current medications, past family history, past medical history, past social history, past surgical history and problem list      Past Medical History:   Diagnosis Date   • Diabetes mellitus ([facility] )    • High cholesterol    • Hypertension        Past Surgical History:   Procedure Laterality Date   • HYSTERECTOMY         Family History   Problem Relation Age of Onset   • Diabetes Mother    • Diabetes Father          Medications have been verified  Objective   /62   Pulse 73   Temp 97 9 °F (36 6 °C)   Resp 16   SpO2 98%   No LMP recorded  Patient has had a hysterectomy  Physical Exam     Physical Exam  Vitals reviewed  Constitutional:       General: She is not in acute distress  Appearance: She is well-developed  She is not diaphoretic     HENT: Head: Normocephalic and atraumatic  Right Ear: External ear normal  There is impacted cerumen  Left Ear: External ear normal  There is impacted cerumen  Nose: Nose normal    Cardiovascular:      Rate and Rhythm: Normal rate and regular rhythm  Heart sounds: Normal heart sounds  No murmur heard  No friction rub  No gallop  Pulmonary:      Effort: Pulmonary effort is normal  No respiratory distress  Breath sounds: Normal breath sounds  No wheezing, rhonchi or rales  Skin:     General: Skin is warm  Neurological:      Mental Status: She is alert  Psychiatric:         Behavior: Behavior normal          Thought Content: Thought content normal          Judgment: Judgment normal        Ear cerumen removal    Date/Time: 3/31/2023 8:33 AM  Performed by: Pau Greer PA-C  Authorized by: Pau Greer PA-C   Universal Protocol:  Consent: Verbal consent obtained  Risks and benefits: risks, benefits and alternatives were discussed  Consent given by: patient  Patient identity confirmed: verbally with patient      Procedure details:     Location:  R ear and L ear    Procedure type: irrigation only    Post-procedure details:     Complication:  None    Hearing quality:  Normal (R ear only)    Patient tolerance of procedure: Tolerated well, no immediate complications  Comments:      Debrox placed in each ear for 15 minutes

## 2023-03-31 NOTE — TELEPHONE ENCOUNTER
----- Message from [facility] sent at 3/30/2023 12:55 PM EDT -----  Regarding: DM Eye Exam  03/30/23 12:56 PM    Hello, our patient Hollis Rosales has had Diabetic Eye Exam completed/performed  Please assist in updating the patient chart by making an External outreach to [facility], Dr Hiwot Rajput facility located in [facility], [address] [place] [address]  The date of service is February 2023      Thank you,  Emelia Lopez   [facility]

## 2023-03-31 NOTE — TELEPHONE ENCOUNTER
Upon review of the In Basket request we were able to locate, review, and update the patient chart as requested for Diabetic Eye Exam     Any additional questions or concerns should be emailed to the Practice Liaisons via the appropriate education email address, please do not reply via In Basket      Thank you  Stew Jara MA

## 2023-05-05 ENCOUNTER — OFFICE VISIT (OUTPATIENT)
Dept: URGENT CARE | Facility: CLINIC | Age: 58
End: 2023-05-05

## 2023-05-05 VITALS
HEIGHT: 71 IN | RESPIRATION RATE: 18 BRPM | BODY MASS INDEX: 28.98 KG/M2 | DIASTOLIC BLOOD PRESSURE: 86 MMHG | SYSTOLIC BLOOD PRESSURE: 162 MMHG | WEIGHT: 207 LBS | OXYGEN SATURATION: 100 % | TEMPERATURE: 97.7 F | HEART RATE: 70 BPM

## 2023-05-05 DIAGNOSIS — H92.01 RIGHT EAR PAIN: ICD-10-CM

## 2023-05-05 DIAGNOSIS — J01.90 ACUTE SINUSITIS, RECURRENCE NOT SPECIFIED, UNSPECIFIED LOCATION: Primary | ICD-10-CM

## 2023-05-05 RX ORDER — FLUTICASONE PROPIONATE 50 MCG
2 SPRAY, SUSPENSION (ML) NASAL DAILY
Qty: 11.1 ML | Refills: 0 | Status: SHIPPED | OUTPATIENT
Start: 2023-05-05

## 2023-05-05 RX ORDER — AMOXICILLIN AND CLAVULANATE POTASSIUM 875; 125 MG/1; MG/1
1 TABLET, FILM COATED ORAL EVERY 12 HOURS SCHEDULED
Qty: 14 TABLET | Refills: 0 | Status: SHIPPED | OUTPATIENT
Start: 2023-05-05 | End: 2023-05-12

## 2023-05-05 NOTE — PROGRESS NOTES
[facility]        NAME: Chetan Pedro is a 61 y o  female  : 1965    MRN: [id number]  DATE: May 5, 2023  TIME: 8:24 AM    Assessment and Plan   Acute sinusitis, recurrence not specified, unspecified location [[address]]  1  Acute sinusitis, recurrence not specified, unspecified location  amoxicillin-clavulanate (AUGMENTIN) 875-125 mg per tablet    fluticasone (FLONASE) 50 mcg/act nasal spray      2  Right ear pain          Sinus pressure/congestion/pain going on for 1 to 2 days  PT also having right ear pain without any signs of acute otitis media or acute otitis externa  Sending in Augmentin and Flonase  Advised to follow-up with her ENT appointment as scheduled for   Advised return or go to the ER symptoms worsen  Patient Instructions     Take prescribed medication as instructed  May continue with Tylenol or ibuprofen for pain or fever  Stay well-hydrated  Cool-mist humidifier  Continue to follow-up with ENT as scheduled  Follow-up with your family doctor  If symptoms worsen, return or go to the ER  Follow up with PCP in 3-5 days  Proceed to  ER if symptoms worsen  Chief Complaint     Chief Complaint   Patient presents with    Earache     C/o right ear pain since yesterday am  Pt also c/o right sided headache and post nasal drip  Pt medicated with tylenol sinus without relief  Denies fever  History of Present Illness       54-year-old female presents with bilateral sinus pain, pressure, drainage and right ear pain  PT has been taking Tylenol and ibuprofen with some relief of symptoms  Denies any sick contacts  PT has an ENT appointment scheduled for   Denies any fever, chills, chest pain, shortness of breath, abdominal pain, nausea, vomiting, diarrhea  Review of Systems   Review of Systems   Constitutional: Negative  HENT: Positive for congestion, ear pain, postnasal drip, rhinorrhea, sinus pressure and sinus pain   Negative for ear discharge and sore throat  Eyes: Negative  Respiratory: Negative  Cardiovascular: Negative  Gastrointestinal: Negative  Musculoskeletal: Negative  Skin: Negative  Neurological: Negative            Current Medications       Current Outpatient Medications:     amLODIPine (NORVASC) 5 mg tablet, Take 1 tablet (5 mg total) by mouth daily, Disp: 90 tablet, Rfl: 1    amoxicillin-clavulanate (AUGMENTIN) 875-125 mg per tablet, Take 1 tablet by mouth every 12 (twelve) hours for 7 days, Disp: 14 tablet, Rfl: 0    aspirin (Adult Aspirin Regimen) 81 mg EC tablet, Take 1 tablet (81 mg total) by mouth daily, Disp: 90 tablet, Rfl: 1    atorvastatin (LIPITOR) 20 mg tablet, Take 1 tablet (20 mg total) by mouth daily, Disp: 90 tablet, Rfl: 3    fluticasone (FLONASE) 50 mcg/act nasal spray, 2 sprays into each nostril daily, Disp: 11 1 mL, Rfl: 0    folic acid (FOLVITE) 471 MCG tablet, Take 0 5 tablets (400 mcg total) by mouth daily, Disp: 90 tablet, Rfl: 1    glucose blood (Accu-Chek Guide) test strip, Test twice daily for fluctuating blood sugars, Disp: 100 strip, Rfl: 3    metFORMIN (GLUCOPHAGE) 1000 MG tablet, Take 1 tablet (1,000 mg total) by mouth 2 (two) times a day with meals, Disp: 180 tablet, Rfl: 1    valsartan-hydrochlorothiazide (DIOVAN-HCT) 160-12 5 MG per tablet, Take 1 tablet by mouth daily, Disp: 90 tablet, Rfl: 1    dulaglutide (Trulicity) 2 39 WI/3 7OT injection, Inject 0 5 mL (0 75 mg total) under the skin once a week, Disp: 2 mL, Rfl: 3    Current Allergies     Allergies as of 05/05/2023 - Reviewed 05/05/2023   Allergen Reaction Noted    Jardiance [empagliflozin] GI Intolerance 10/25/2022    Ozempic (0 25 or 0 5 mg-dose) [semaglutide(0 25 or 0 5mg-dos)] GI Intolerance 09/16/2022            The following portions of the patient's history were reviewed and updated as appropriate: allergies, current medications, past family history, past medical history, past social history, past surgical history and "problem list      Past Medical History:   Diagnosis Date    Diabetes mellitus ([address] )     High cholesterol     Hypertension        Past Surgical History:   Procedure Laterality Date    HYSTERECTOMY         Family History   Problem Relation Age of Onset    Diabetes Mother     Diabetes Father          Medications have been verified  Objective   /86 (BP Location: Left arm)   Pulse 70   Temp 97 7 °F (36 5 °C)   Resp 18   Ht 5' 11\" (1 803 m)   Wt 93 9 kg (207 lb)   SpO2 100%   BMI 28 87 kg/m²        Physical Exam     Physical Exam  Constitutional:       General: She is not in acute distress  Appearance: Normal appearance  She is not ill-appearing  HENT:      Head: Normocephalic and atraumatic  Right Ear: Tympanic membrane, ear canal and external ear normal       Left Ear: Tympanic membrane, ear canal and external ear normal       Nose: Congestion present  Right Sinus: Maxillary sinus tenderness present  Left Sinus: Maxillary sinus tenderness present  Mouth/Throat:      Mouth: Mucous membranes are moist       Pharynx: Oropharynx is clear  No posterior oropharyngeal erythema  Eyes:      Extraocular Movements: Extraocular movements intact  Conjunctiva/sclera: Conjunctivae normal       Pupils: Pupils are equal, round, and reactive to light  Cardiovascular:      Rate and Rhythm: Normal rate and regular rhythm  Pulses: Normal pulses  Heart sounds: Normal heart sounds  Pulmonary:      Effort: Pulmonary effort is normal       Breath sounds: Normal breath sounds  Skin:     General: Skin is warm and dry  Capillary Refill: Capillary refill takes less than 2 seconds  Findings: No rash  Neurological:      General: No focal deficit present  Mental Status: She is alert and oriented to person, place, and time  Mental status is at baseline     Psychiatric:         Mood and Affect: Mood normal          Behavior: Behavior normal                    "

## 2023-05-05 NOTE — PATIENT INSTRUCTIONS
Take prescribed medication as instructed  May continue with Tylenol or ibuprofen for pain or fever  Stay well-hydrated  Cool-mist humidifier  Continue to follow-up with ENT as scheduled  Follow-up with your family doctor  If symptoms worsen, return or go to the ER  Follow up with PCP in 3-5 days  Proceed to  ER if symptoms worsen  Sinusitis   WHAT YOU NEED TO KNOW:   Sinusitis is inflammation or infection of your sinuses  Sinusitis is most often caused by a virus  Acute sinusitis may last up to 12 weeks  Chronic sinusitis lasts longer than 12 weeks  Recurrent sinusitis means you have 4 or more infections in 1 year  DISCHARGE INSTRUCTIONS:   Return to the emergency department if:   You have trouble breathing or wheezing that is getting worse  You have a stiff neck, a fever, or a bad headache  You cannot open your eye  Your eyeball bulges out or you cannot move your eye  You are more sleepy than normal, or you notice changes in your ability to think, move, or talk  You have swelling of your forehead or scalp  Call your doctor if:   You have vision changes, such as double vision  Your eye and eyelid are red, swollen, and painful  Your symptoms do not improve or go away after 10 days  You have nausea and are vomiting  Your nose is bleeding  You have questions or concerns about your condition or care  Medicines: Your symptoms may go away on their own  Your healthcare provider may recommend watchful waiting for up to 10 days before starting antibiotics  You may need any of the following:  Acetaminophen  decreases pain and fever  It is available without a doctor's order  Ask how much to take and how often to take it  Follow directions  Read the labels of all other medicines you are using to see if they also contain acetaminophen, or ask your doctor or pharmacist  Acetaminophen can cause liver damage if not taken correctly      NSAIDs , such as ibuprofen, help decrease swelling, pain, and fever  This medicine is available with or without a doctor's order  NSAIDs can cause stomach bleeding or kidney problems in certain people  If you take blood thinner medicine, always ask your healthcare provider if NSAIDs are safe for you  Always read the medicine label and follow directions  Nasal steroid sprays  may help decrease inflammation in your nose and sinuses  Decongestants  help reduce swelling and drain mucus in the nose and sinuses  They may help you breathe easier  Antihistamines  help dry mucus in the nose and relieve sneezing  Antibiotics  help treat or prevent a bacterial infection  Take your medicine as directed  Contact your healthcare provider if you think your medicine is not helping or if you have side effects  Tell your provider if you are allergic to any medicine  Keep a list of the medicines, vitamins, and herbs you take  Include the amounts, and when and why you take them  Bring the list or the pill bottles to follow-up visits  Carry your medicine list with you in case of an emergency  Self-care:   Rinse your sinuses as directed  Use a sinus rinse device to rinse your nasal passages with a saline (salt water) solution or distilled water  Do not use tap water  This will help thin the mucus in your nose and rinse away pollen and dirt  It will also help reduce swelling so you can breathe normally  Use a humidifier  to increase air moisture in your home  This may make it easier for you to breathe and help decrease your cough  Sleep with your head elevated  Place an extra pillow under your head before you go to sleep to help your sinuses drain  Drink liquids as directed  Ask your healthcare provider how much liquid to drink each day and which liquids are best for you  Liquids will thin the mucus in your nose and help it drain  Avoid drinks that contain alcohol or caffeine  Do not smoke, and avoid secondhand smoke    Nicotine and other chemicals in cigarettes and cigars can make your symptoms worse  Ask your healthcare provider for information if you currently smoke and need help to quit  E-cigarettes or smokeless tobacco still contain nicotine  Talk to your healthcare provider before you use these products  Prevent the spread of germs:   Wash your hands often with soap and water  Wash your hands after you use the bathroom, change a child's diaper, or sneeze  Wash your hands before you prepare or eat food  Stay away from people who are sick  Some germs spread easily and quickly through contact  Follow up with your doctor as directed: You may be referred to an ear, nose, and throat specialist  Write down your questions so you remember to ask them during your visits  © Copyright [employer] 2022 Information is for End User's use only and may not be sold, redistributed or otherwise used for commercial purposes  The above information is an  only  It is not intended as medical advice for individual conditions or treatments  Talk to your doctor, nurse or pharmacist before following any medical regimen to see if it is safe and effective for you  Earache   DISCHARGE INSTRUCTIONS:   Return to the emergency department if:   You have a severe earache  You have ear pain with itching, hearing loss, dizziness, a feeling of fullness in your ear, or ringing in your ears  Call your doctor if:   Your ear pain worsens or does not go away with treatment  You have drainage from your ear  You have a fever  Your outer ear becomes red, swollen, and warm  You have questions or concerns about your condition or care  Medicines: You may need any of the following:  Acetaminophen  decreases pain and fever  It is available without a doctor's order  Ask how much to take and how often to take it  Follow directions   Read the labels of all other medicines you are using to see if they also contain acetaminophen, or ask your doctor or pharmacist  Acetaminophen can cause liver damage if not taken correctly  NSAIDs , such as ibuprofen, help decrease swelling, pain, and fever  This medicine is available with or without a doctor's order  NSAIDs can cause stomach bleeding or kidney problems in certain people  If you take blood thinner medicine, always ask your healthcare provider if NSAIDs are safe for you  Always read the medicine label and follow directions  Do not give aspirin to children younger than 18 years  Your child could develop Reye syndrome if he or she has the flu or a fever and takes aspirin  Reye syndrome can cause life-threatening brain and liver damage  Check your child's medicine labels for aspirin or salicylates  Take your medicine as directed  Contact your healthcare provider if you think your medicine is not helping or if you have side effects  Tell your provider if you are allergic to any medicine  Keep a list of the medicines, vitamins, and herbs you take  Include the amounts, and when and why you take them  Bring the list or the pill bottles to follow-up visits  Carry your medicine list with you in case of an emergency  Follow up with your doctor as directed:  Write down your questions so you remember to ask them during your visits  © Copyright [facility] 2022 Information is for End User's use only and may not be sold, redistributed or otherwise used for commercial purposes  The above information is an  only  It is not intended as medical advice for individual conditions or treatments  Talk to your doctor, nurse or pharmacist before following any medical regimen to see if it is safe and effective for you

## 2023-05-15 ENCOUNTER — APPOINTMENT (OUTPATIENT)
Dept: LAB | Facility: CLINIC | Age: 58
End: 2023-05-15

## 2023-05-15 DIAGNOSIS — E78.2 MIXED HYPERLIPIDEMIA: ICD-10-CM

## 2023-05-15 DIAGNOSIS — E11.9 TYPE 2 DIABETES MELLITUS WITHOUT COMPLICATION, WITHOUT LONG-TERM CURRENT USE OF INSULIN (HCC): ICD-10-CM

## 2023-05-15 LAB
ANION GAP SERPL CALCULATED.3IONS-SCNC: 0 MMOL/L (ref 4–13)
BUN SERPL-MCNC: 23 MG/DL (ref 5–25)
CALCIUM SERPL-MCNC: 9.1 MG/DL (ref 8.3–10.1)
CHLORIDE SERPL-SCNC: 109 MMOL/L (ref 96–108)
CO2 SERPL-SCNC: 26 MMOL/L (ref 21–32)
CREAT SERPL-MCNC: 0.75 MG/DL (ref 0.6–1.3)
EST. AVERAGE GLUCOSE BLD GHB EST-MCNC: 160 MG/DL
GFR SERPL CREATININE-BSD FRML MDRD: 88 ML/MIN/1.73SQ M
GLUCOSE P FAST SERPL-MCNC: 204 MG/DL (ref 65–99)
HBA1C MFR BLD: 7.2 %
POTASSIUM SERPL-SCNC: 4 MMOL/L (ref 3.5–5.3)
SODIUM SERPL-SCNC: 135 MMOL/L (ref 135–147)

## 2023-05-16 ENCOUNTER — OFFICE VISIT (OUTPATIENT)
Dept: ENDOCRINOLOGY | Facility: CLINIC | Age: 58
End: 2023-05-16

## 2023-05-16 VITALS
BODY MASS INDEX: 28.67 KG/M2 | HEART RATE: 97 BPM | WEIGHT: 204.8 LBS | SYSTOLIC BLOOD PRESSURE: 140 MMHG | DIASTOLIC BLOOD PRESSURE: 90 MMHG | HEIGHT: 71 IN

## 2023-05-16 DIAGNOSIS — I10 PRIMARY HYPERTENSION: ICD-10-CM

## 2023-05-16 DIAGNOSIS — D17.79 MYELOLIPOMA OF RIGHT ADRENAL GLAND: ICD-10-CM

## 2023-05-16 DIAGNOSIS — E11.9 TYPE 2 DIABETES MELLITUS WITHOUT COMPLICATION, WITHOUT LONG-TERM CURRENT USE OF INSULIN (HCC): Primary | ICD-10-CM

## 2023-05-16 NOTE — PROGRESS NOTES
Established Patient Endocrinology Progress Note    Referring Provider  No referring provider defined for this encounter  CC: type 2 diabetes    History of Present Illness:   Felisa Torres is a 61 y o  female who is presenting as an established patient to endocrinology for the management of type 2 DM  She also has a history of myelolipoma on R adrenal gland and HTN  She was diagnosed with type 2 diabetes at age 46-54   She has a history of euglycemic DKA  She denies complications of diabetes but was previously reported to have retinopathy on eye exam - she states that this was not seen on recent exam by Dr Aldo Beltrán office in March 2023 (these records are unavailable to us today)  Other patients in her family developed diabetes at age 32 including mother, brother  Ms Georges Hardy father developed diabetes at 46-42  She reports that she has been eating increased Prasanna bread, pasta, increased carbs because of fear from abnormal mammogram  She reports 5 lbs weight gain  She was last seen 2/2023  Since seeing increase in a1c on labs drawn yesterday to 7 2 (from prior a1c 6 0), she has already started to make changes to her diet to resume prior diet  Current regimen: trulicity 6 72AQ weekly, metformin 1g BID (was previously on jardiance but developed euglycemia DKA, was previously on higher dose of ozempic but had some symptoms of diarrhea, abdominal pain and it was stopped )    Blood pressure at home when she checks is 120s-130s/80-81      Home blood glucose monitoring: every day twice a day, numbers are from memory  Before breakfast: lately 160s-170s  Before dinner: 115-120 (was previously lower in 90s)       Activity: very active at work, [employer]    Diabetes education: is not interested in going    Healthcare maintenance:  Opthamology: last saw in February, previously had retinopathy noted on 3/2022 and 10/2022, but reports most recent exam did not have retinopathy, no records for our review   [facility] at [employer]: recent foot exam by Dr Adam Tejada 3/30/2023  HLD: on atorvastatin 20mg    Patient Active Problem List   Diagnosis   • Type 2 diabetes mellitus without complication, without long-term current use of insulin ([facility] )   • Myelolipoma of right adrenal gland   • Hepatomegaly   • Fatty liver   • Elevated hemoglobin (HCC)   • Diarrhea   • Elevated troponin level   • Primary hypertension   • Hypercholesterolemia   • Coronary artery calcification seen on CAT scan   • Healthcare maintenance   • BMI 27 0-27 9,adult      Past Medical History:   Diagnosis Date   • Diabetes mellitus ([facility] )    • High cholesterol    • Hypertension       Past Surgical History:   Procedure Laterality Date   • HYSTERECTOMY        Family History   Problem Relation Age of Onset   • Diabetes Mother    • Diabetes Father      Social History     Tobacco Use   • Smoking status: Former   • Smokeless tobacco: Never   Substance Use Topics   • Alcohol use: Never     Allergies   Allergen Reactions   • Jardiance [Empagliflozin] GI Intolerance   • Ozempic (0 25 Or 0 5 Mg-Dose) [Semaglutide(0 25 Or 0 5mg-Dos)] GI Intolerance         Current Outpatient Medications:   •  amLODIPine (NORVASC) 5 mg tablet, Take 1 tablet (5 mg total) by mouth daily, Disp: 90 tablet, Rfl: 1  •  aspirin (Adult Aspirin Regimen) 81 mg EC tablet, Take 1 tablet (81 mg total) by mouth daily, Disp: 90 tablet, Rfl: 1  •  atorvastatin (LIPITOR) 20 mg tablet, Take 1 tablet (20 mg total) by mouth daily, Disp: 90 tablet, Rfl: 3  •  fluticasone (FLONASE) 50 mcg/act nasal spray, 2 sprays into each nostril daily, Disp: 11 1 mL, Rfl: 0  •  folic acid (FOLVITE) 561 MCG tablet, Take 0 5 tablets (400 mcg total) by mouth daily, Disp: 90 tablet, Rfl: 1  •  glucose blood (Accu-Chek Guide) test strip, Test twice daily for fluctuating blood sugars, Disp: 100 strip, Rfl: 3  •  metFORMIN (GLUCOPHAGE) 1000 MG tablet, Take 1 tablet (1,000 mg total) by mouth 2 (two) times a day with meals, "Disp: 180 tablet, Rfl: 1  •  tirzepatide (Mounjaro) 5 MG/0 5ML, Inject 0 5 mL (5 mg total) under the skin every 7 days, Disp: 6 mL, Rfl: 1  •  valsartan-hydrochlorothiazide (DIOVAN-HCT) 160-12 5 MG per tablet, Take 1 tablet by mouth daily, Disp: 90 tablet, Rfl: 1  Review of Systems   Constitutional: Negative for chills, fatigue and fever  HENT: Negative for rhinorrhea and sore throat  Respiratory: Negative for choking, chest tightness, shortness of breath, wheezing and stridor  Cardiovascular: Negative for chest pain, palpitations and leg swelling  Gastrointestinal: Negative for abdominal pain, blood in stool, constipation, diarrhea, nausea and vomiting  Genitourinary: Negative for hematuria  Neurological: Negative for dizziness and light-headedness  All other systems reviewed and are negative  Physical Exam:  Body mass index is 28 56 kg/m²  /90   Pulse 97   Ht 5' 11\" (1 803 m)   Wt 92 9 kg (204 lb 12 8 oz)   BMI 28 56 kg/m²    Wt Readings from Last 3 Encounters:   05/16/23 92 9 kg (204 lb 12 8 oz)   05/05/23 93 9 kg (207 lb)   03/30/23 89 kg (196 lb 3 2 oz)       Physical Exam  Vitals reviewed  Constitutional:       Appearance: She is well-developed  HENT:      Head: Normocephalic and atraumatic  Eyes:      General:         Right eye: No discharge  Left eye: No discharge  Cardiovascular:      Rate and Rhythm: Normal rate and regular rhythm  Heart sounds: Normal heart sounds  No murmur heard  No friction rub  No gallop  Pulmonary:      Effort: Pulmonary effort is normal  No respiratory distress  Breath sounds: Normal breath sounds  No wheezing or rales  Chest:      Chest wall: No tenderness  Abdominal:      General: Bowel sounds are normal  There is no distension  Palpations: Abdomen is soft  There is no mass  Tenderness: There is no abdominal tenderness  Musculoskeletal:         General: Normal range of motion        Cervical back: Normal " range of motion and neck supple  Skin:     General: Skin is warm and dry  Neurological:      Mental Status: She is alert and oriented to person, place, and time  Psychiatric:         Behavior: Behavior normal          Labs:   Lab Results   Component Value Date    HGBA1C 7 2 (H) 05/15/2023       Lab Results   Component Value Date    NYU3EAYDPWRY 1 129 10/22/2022       Lab Results   Component Value Date    CREATININE 0 75 05/15/2023    CREATININE 0 67 02/01/2023    CREATININE 0 66 10/22/2022    BUN 23 05/15/2023    K 4 0 05/15/2023     (H) 05/15/2023    CO2 26 05/15/2023     eGFR   Date Value Ref Range Status   05/15/2023 88 ml/min/1 73sq m Final     No components found for: [facility]    Lab Results   Component Value Date    HDL 64 02/01/2023    TRIG 122 02/01/2023       Lab Results   Component Value Date    ALT 29 10/22/2022    AST 17 10/22/2022    ALKPHOS 56 10/22/2022       Not on file     Impression:  1  Type 2 diabetes mellitus without complication, without long-term current use of insulin ([address] )    2  Myelolipoma of right adrenal gland    3  Primary hypertension           Plan:  Diagnoses and all orders for this visit:    Type 2 diabetes mellitus without complication, without long-term current use of insulin ([address] )  -     tirzepatide (Mounjaro) 5 MG/0 5ML; Inject 0 5 mL (5 mg total) under the skin every 7 days  -     Basic metabolic panel Clinic Collect; Future  -     HEMOGLOBIN A1C W/ EAG ESTIMATION Clinic Collect; Future  -     Albumin / creatinine urine ratio; Future  Ms Ovalle Never will work on her diet  We will switch to tirzepatide 0 5mg weekly  Continue metforin 1000mg BID  Discontinue trulicity  Will try to obtain records of eye exam   Recheck labs in 4 months    Myelolipoma of right adrenal gland  Previously aldosterone level <5, no renin available, AM cortisol was mildly elevated at 28 0  Can consider additional testing in the future if clinically indicated      Primary hypertension  Takes amlodipine 5mg daily, diovan (valsartan-hctz) 160-12 5  BP improved from last visit  Pt reports some degree of hypertension at office visits that is not present at home      Discussed with the patient and all questioned fully answered  She will call me if any problems arise

## 2023-05-22 ENCOUNTER — TELEPHONE (OUTPATIENT)
Dept: ENDOCRINOLOGY | Facility: CLINIC | Age: 58
End: 2023-05-22

## 2023-05-23 ENCOUNTER — TELEPHONE (OUTPATIENT)
Dept: ENDOCRINOLOGY | Facility: CLINIC | Age: 58
End: 2023-05-23

## 2023-05-29 PROBLEM — Z00.00 HEALTHCARE MAINTENANCE: Status: RESOLVED | Noted: 2023-03-30 | Resolved: 2023-05-29

## 2023-06-07 ENCOUNTER — TELEPHONE (OUTPATIENT)
Dept: PAIN MEDICINE | Facility: CLINIC | Age: 58
End: 2023-06-07

## 2023-07-20 ENCOUNTER — APPOINTMENT (OUTPATIENT)
Dept: LAB | Facility: CLINIC | Age: 58
End: 2023-07-20
Payer: COMMERCIAL

## 2023-07-20 ENCOUNTER — TELEPHONE (OUTPATIENT)
Dept: ENDOCRINOLOGY | Facility: CLINIC | Age: 58
End: 2023-07-20

## 2023-07-20 DIAGNOSIS — R30.0 DYSURIA: Primary | ICD-10-CM

## 2023-07-20 DIAGNOSIS — R30.0 DYSURIA: ICD-10-CM

## 2023-07-20 LAB
BACTERIA UR QL AUTO: NORMAL /HPF
BILIRUB UR QL STRIP: NEGATIVE
CLARITY UR: CLEAR
COLOR UR: ABNORMAL
GLUCOSE UR STRIP-MCNC: NEGATIVE MG/DL
HGB UR QL STRIP.AUTO: NEGATIVE
KETONES UR STRIP-MCNC: NEGATIVE MG/DL
LEUKOCYTE ESTERASE UR QL STRIP: ABNORMAL
NITRITE UR QL STRIP: NEGATIVE
NON-SQ EPI CELLS URNS QL MICRO: NORMAL /HPF
PH UR STRIP.AUTO: 5.5 [PH]
PROT UR STRIP-MCNC: NEGATIVE MG/DL
RBC #/AREA URNS AUTO: NORMAL /HPF
SP GR UR STRIP.AUTO: 1.01 (ref 1–1.03)
UROBILINOGEN UR STRIP-ACNC: <2 MG/DL
WBC #/AREA URNS AUTO: NORMAL /HPF

## 2023-07-20 PROCEDURE — 87086 URINE CULTURE/COLONY COUNT: CPT

## 2023-07-20 PROCEDURE — 81001 URINALYSIS AUTO W/SCOPE: CPT

## 2023-07-20 NOTE — TELEPHONE ENCOUNTER
Patient called and took her first shot of Mounjaro on Monday and she has a rash from it, she had a rash with ozempic and then she ended up in the hospital, so she isnt sure what she should do

## 2023-07-21 LAB — BACTERIA UR CULT: NORMAL

## 2023-07-27 ENCOUNTER — OFFICE VISIT (OUTPATIENT)
Dept: OTOLARYNGOLOGY | Facility: CLINIC | Age: 58
End: 2023-07-27
Payer: COMMERCIAL

## 2023-07-27 ENCOUNTER — OFFICE VISIT (OUTPATIENT)
Dept: AUDIOLOGY | Facility: CLINIC | Age: 58
End: 2023-07-27
Payer: COMMERCIAL

## 2023-07-27 VITALS
OXYGEN SATURATION: 98 % | SYSTOLIC BLOOD PRESSURE: 158 MMHG | BODY MASS INDEX: 30.11 KG/M2 | TEMPERATURE: 98.4 F | DIASTOLIC BLOOD PRESSURE: 80 MMHG | WEIGHT: 215.1 LBS | HEART RATE: 113 BPM | HEIGHT: 71 IN

## 2023-07-27 DIAGNOSIS — H61.23 CERUMEN DEBRIS ON TYMPANIC MEMBRANE OF BOTH EARS: ICD-10-CM

## 2023-07-27 DIAGNOSIS — H90.3 SENSORINEURAL HEARING LOSS (SNHL) OF BOTH EARS: ICD-10-CM

## 2023-07-27 DIAGNOSIS — Z97.4 WEARS HEARING AID IN BOTH EARS: ICD-10-CM

## 2023-07-27 DIAGNOSIS — H90.3 SENSORY HEARING LOSS, BILATERAL: Primary | ICD-10-CM

## 2023-07-27 DIAGNOSIS — H93.8X3 SENSATION OF PLUGGED EAR ON BOTH SIDES: Primary | ICD-10-CM

## 2023-07-27 DIAGNOSIS — H61.23 BILATERAL IMPACTED CERUMEN: ICD-10-CM

## 2023-07-27 PROCEDURE — 92557 COMPREHENSIVE HEARING TEST: CPT | Performed by: AUDIOLOGIST-HEARING AID FITTER

## 2023-07-27 PROCEDURE — 99203 OFFICE O/P NEW LOW 30 MIN: CPT | Performed by: PHYSICIAN ASSISTANT

## 2023-07-27 PROCEDURE — 69210 REMOVE IMPACTED EAR WAX UNI: CPT | Performed by: PHYSICIAN ASSISTANT

## 2023-07-27 PROCEDURE — 92567 TYMPANOMETRY: CPT | Performed by: AUDIOLOGIST-HEARING AID FITTER

## 2023-07-27 NOTE — PROGRESS NOTES
[facility] Otolaryngology New Patient visit      Shara Murphy is a 61 y.o. female who presents with a chief complaint of ears     Independent historian: none         Pertinent elements of the history:  Hx of wax impaction    Wears hearing aids from [facility] [employer]  approx 2year old    Having trouble hearing lately  L ear is blocked   Crackles when yawning    Saw Urgent Care in March   B/l wax impaction removed with irrigation    Symptoms still persist despite removal    No otalgia  No otorrhea        Review of any relevant imaging: images from any scan reviewed personally  Scans:   Labs:   Notes:  note    Review of Systems:  As above    PMHx:  Past Medical History:   Diagnosis Date   • Diabetes mellitus ([address])    • Ear problems Year and a half   • High cholesterol    • Hypertension    • Sinusitis         FAMHx:  Family History   Problem Relation Age of Onset   • Diabetes Mother    • Diabetes Father    • Hypertension Father        SOCHx:  Social History     Socioeconomic History   • Marital status: /Civil Union     Spouse name: None   • Number of children: None   • Years of education: None   • Highest education level: None   Occupational History   • None   Tobacco Use   • Smoking status: Former     Packs/day: 1.50     Years: 15.00     Total pack years: 22.50     Types: Cigarettes     Start date: 1984     Quit date: 4/10/2010     Years since quittin.3   • Smokeless tobacco: Never   Vaping Use   • Vaping Use: Never used   Substance and Sexual Activity   • Alcohol use: Never   • Drug use: Never   • Sexual activity: Yes     Partners: Male     Birth control/protection: None   Other Topics Concern   • None   Social History Narrative   • None     Social Determinants of Health     Financial Resource Strain: Low Risk  (3/14/2023)    Overall Financial Resource Strain (CARDIA)    • Difficulty of Paying Living Expenses: Not hard at all   Food Insecurity: No Food Insecurity (10/21/2022)    Hunger Vital Sign • Worried About [place] in the Last Year: Never true    • Ran Out of Food in the Last Year: Never true   Transportation Needs: No Transportation Needs (3/14/2023)    PRAPARE - Transportation    • Lack of Transportation (Medical): No    • Lack of Transportation (Non-Medical): No   Physical Activity: Not on file   Stress: Not on file   Social Connections: Not on file   Intimate Partner Violence: Not on file   Housing Stability: Low Risk  (10/21/2022)    Housing Stability Vital Sign    • Unable to Pay for Housing in the Last Year: No    • Number of Places Lived in the Last Year: 1    • Unstable Housing in the Last Year: No       Allergies: Allergies   Allergen Reactions   • Jardiance [Empagliflozin] GI Intolerance   • Ozempic (0.25 Or 0.5 Mg-Dose) [Semaglutide(0.25 Or 0.5mg-Dos)] GI Intolerance        MEDS:  Reviewed      Physical exam: (abnormal findings appear in bold and supercede any conflicting normal findings listed below)    /80 (BP Location: Left arm, Patient Position: Sitting, Cuff Size: Adult)   Pulse (!) 113   Temp 98.4 °F (36.9 °C) (Temporal)   Ht 5' 11" (1.803 m)   Wt 97.6 kg (215 lb 1.6 oz)   SpO2 98%   BMI 30.00 kg/m²     Constitutional:  Well developed, well nourished and groomed, in no acute distress. Eyes:  Extra-ocular movements intact, pupils equally round and reactive to light and accommodation, the lids and conjunctivae are normal in appearance. Head: Atraumatic, normocephalic, no visible scalp lesions, bony palpation unremarkable without stepoffs, parotid and submandibular salivary glands non-tender to palpation and without masses bilaterally. Ears:  Auricles normal in appearance bilaterally, mastoid prominence non-tender, external auditory canals clear bilaterally, tympanic membranes intact bilaterally without evidence of middle ear effusion or masses, normal appearing ossicles. Bilateral cerumen impaction adhered to TM. See proc.     Nose/Sinuses:  External appearance unremarkable, no maxillary or frontal sinus tenderness to palpation bilaterally. Anterior rhinoscopy demonstrates pink mucosa. No polyps or other masses identified. Turbinates are non-edematous. No evidence of purulent drainage. Oral Cavity:  Moist mucus membranes, gums and dentition unremarkable, no oral mucosal masses or lesions, floor of mouth soft, tongue mobile without masses or lesions. Oropharynx:  Base of tongue soft and without masses, tonsils bilaterally unremarkable, soft palate mucosa unremarkable. Neck:  No visible or palpable cervical lesions or lymphadenopathy, thyroid gland is normal in size and symmetry and without masses, normal laryngeal elevation with swallowing. Cardiovascular:  Normal rate and rhythm, no palpable thrills, no jugulovenous distension observed. Respiratory:  Normal respiratory effort without evidence of retractions or use of accessory muscles. Integument:  Normal appearing without observed masses or lesions. Neurologic:  Cranial nerves II-XII intact bilaterally. Psychiatric:  Alert and oriented to time, place and person, normal affect. Procedure:  Procedure: Cerumen debridement b/l EAC     Indications: Cerumen impaction b/l EAC     Procedure in detail: After informed verbal consent was obtained the ear was visualized using procedural otoscope. Affected ear was debrided of cerumen using cerumen loop, suction, and alligator forceps. The findings below were seen. The patient tolerated the procedure well. FINDINGS: Cerumen impaction removed without difficulty. Audiometry:  Audiogram ordered and reviewed. Mild sloping to moderately severe high frequency SNHL. Word recognition left, 92% at 80 dB, right 100 % at 70 dB. Tympanograms type A bilaterally. Assessment:  1. Sensation of plugged ear on both sides        2. Bilateral impacted cerumen  Ambulatory Referral to Otolaryngology      3.  Cerumen debris on tympanic membrane of both ears 4. Sensorineural hearing loss (SNHL) of both ears        5. Wears hearing aid in both ears            Plan:  1. Sung Bal is a 61 y.o. female with acute and chronic problems as above who presents for evaluation of hearing loss, particularly in the left ear. Wears hearing aids from [facility] [place]. Cerumen removal at Urgent Care in March without change. Cerumen adhered to TMs bilaterally, L>R. Mostly localized to the anterior TM. Removed with alligator forceps. Patient tolerated well. Pars flaccida clear after removal. TM's intact without retraction. Patient notices improvement after removal. Audiogram demonstrates mild sloping to moderately severe HF SNHL with excellent word recognition and type A tymps. Oil and cerumen within the EAC is a natural process. Recommend against using Q-tips. Patient can consider application of OTC Debrox as needed to facilitate removal but would overall recommend routine follow up to remove wax. Patient was encouraged to continue wearing hearing amplification and undergo regular hearing aid maintenance visits to maintain optimal ear health. Ear cleaning and repeat audio in 1 year. ** Please Note: Portions of the record may have been created with voice recognition software. Occasional wrong word or "sound a like" substitutions may have occurred due to the inherent limitations of voice recognition software. There may also be notations and random deletions of words or characters from malfunctioning software. Read the chart carefully and recognize, using context, where substitutions/deletions have occurred. **

## 2023-07-27 NOTE — PROGRESS NOTES
Review of Systems   Constitutional: Negative. HENT: Positive for hearing loss. Crackling noise when yawning or coughing. Eyes: Negative. Respiratory: Negative. Cardiovascular: Negative. Gastrointestinal: Negative. Endocrine: Negative. Genitourinary: Negative. Musculoskeletal: Negative. Skin: Negative. Allergic/Immunologic: Negative. Neurological: Negative. Hematological: Negative. Psychiatric/Behavioral: Negative.

## 2023-07-27 NOTE — PROGRESS NOTES
ENT HEARING EVALUATION    Name:  Alexandre Leon  :  1965  Age:  61 y.o. Date of Evaluation: 23     History: ENT Audiogram  Reason for visit: Alexandre Leon is being seen today at the request of Rhonda Ortiz PA-C for an evaluation of hearing as part of their initial ENT visit. EVALUATION:    Tympanometry:   Right: Type A - normal middle ear pressure and compliance   Left: Type A - normal middle ear pressure and compliance    Audiogram Results:  Pure tone testing revealed a mild sloping to moderately severe sensorineural hearing loss bilaterally. SRT and PTA are in agreement indicating good test reliability. Word recognition scores were excellent bilaterally.      *see attached audiogram      RECOMMENDATIONS:  Consult ENT      Leo Hall, CCC-A  Clinical Audiologist

## 2023-07-29 DIAGNOSIS — I10 PRIMARY HYPERTENSION: ICD-10-CM

## 2023-07-31 ENCOUNTER — TELEPHONE (OUTPATIENT)
Dept: ENDOCRINOLOGY | Facility: CLINIC | Age: 58
End: 2023-07-31

## 2023-07-31 DIAGNOSIS — E11.65 TYPE 2 DIABETES MELLITUS WITH HYPERGLYCEMIA, WITHOUT LONG-TERM CURRENT USE OF INSULIN (HCC): Primary | ICD-10-CM

## 2023-07-31 RX ORDER — DULAGLUTIDE 0.75 MG/.5ML
0.75 INJECTION, SOLUTION SUBCUTANEOUS
Qty: 6 ML | Refills: 1 | Status: SHIPPED | OUTPATIENT
Start: 2023-07-31

## 2023-07-31 RX ORDER — VALSARTAN AND HYDROCHLOROTHIAZIDE 160; 12.5 MG/1; MG/1
1 TABLET, FILM COATED ORAL DAILY
Qty: 90 TABLET | Refills: 1 | Status: SHIPPED | OUTPATIENT
Start: 2023-07-31

## 2023-07-31 NOTE — TELEPHONE ENCOUNTER
Patient wants to stop mounjaro as she is getting a rash she wants to go back on trulicity needs to be a 90 day supply

## 2023-08-15 ENCOUNTER — APPOINTMENT (OUTPATIENT)
Dept: LAB | Facility: CLINIC | Age: 58
End: 2023-08-15
Payer: COMMERCIAL

## 2023-08-15 DIAGNOSIS — E11.9 TYPE 2 DIABETES MELLITUS WITHOUT COMPLICATION, WITHOUT LONG-TERM CURRENT USE OF INSULIN (HCC): ICD-10-CM

## 2023-08-15 LAB
ANION GAP SERPL CALCULATED.3IONS-SCNC: 7 MMOL/L
BUN SERPL-MCNC: 23 MG/DL (ref 5–25)
CALCIUM SERPL-MCNC: 10 MG/DL (ref 8.3–10.1)
CHLORIDE SERPL-SCNC: 106 MMOL/L (ref 96–108)
CO2 SERPL-SCNC: 25 MMOL/L (ref 21–32)
CREAT SERPL-MCNC: 0.75 MG/DL (ref 0.6–1.3)
EST. AVERAGE GLUCOSE BLD GHB EST-MCNC: 171 MG/DL
GFR SERPL CREATININE-BSD FRML MDRD: 87 ML/MIN/1.73SQ M
GLUCOSE P FAST SERPL-MCNC: 154 MG/DL (ref 65–99)
HBA1C MFR BLD: 7.6 %
POTASSIUM SERPL-SCNC: 4 MMOL/L (ref 3.5–5.3)
SODIUM SERPL-SCNC: 138 MMOL/L (ref 135–147)

## 2023-08-15 PROCEDURE — 80048 BASIC METABOLIC PNL TOTAL CA: CPT

## 2023-08-15 PROCEDURE — 83036 HEMOGLOBIN GLYCOSYLATED A1C: CPT

## 2023-08-15 PROCEDURE — 36415 COLL VENOUS BLD VENIPUNCTURE: CPT

## 2023-08-18 DIAGNOSIS — E11.9 TYPE 2 DIABETES MELLITUS WITHOUT COMPLICATION, WITHOUT LONG-TERM CURRENT USE OF INSULIN (HCC): ICD-10-CM

## 2023-08-18 RX ORDER — BLOOD SUGAR DIAGNOSTIC
STRIP MISCELLANEOUS
Qty: 100 STRIP | Refills: 3 | Status: SHIPPED | OUTPATIENT
Start: 2023-08-18

## 2023-09-11 ENCOUNTER — OFFICE VISIT (OUTPATIENT)
Dept: ENDOCRINOLOGY | Facility: CLINIC | Age: 58
End: 2023-09-11
Payer: COMMERCIAL

## 2023-09-11 ENCOUNTER — RA CDI HCC (OUTPATIENT)
Dept: OTHER | Facility: HOSPITAL | Age: 58
End: 2023-09-11

## 2023-09-11 VITALS
BODY MASS INDEX: 29.4 KG/M2 | DIASTOLIC BLOOD PRESSURE: 92 MMHG | SYSTOLIC BLOOD PRESSURE: 160 MMHG | HEIGHT: 71 IN | HEART RATE: 118 BPM | WEIGHT: 210 LBS

## 2023-09-11 DIAGNOSIS — D17.9 MYELOLIPOMA: ICD-10-CM

## 2023-09-11 DIAGNOSIS — E11.319 DIABETIC RETINOPATHY ASSOCIATED WITH TYPE 2 DIABETES MELLITUS, MACULAR EDEMA PRESENCE UNSPECIFIED, UNSPECIFIED LATERALITY, UNSPECIFIED RETINOPATHY SEVERITY (HCC): ICD-10-CM

## 2023-09-11 DIAGNOSIS — E11.65 TYPE 2 DIABETES MELLITUS WITH HYPERGLYCEMIA, WITHOUT LONG-TERM CURRENT USE OF INSULIN (HCC): Primary | ICD-10-CM

## 2023-09-11 DIAGNOSIS — I10 PRIMARY HYPERTENSION: ICD-10-CM

## 2023-09-11 DIAGNOSIS — E78.5 HYPERLIPIDEMIA, UNSPECIFIED HYPERLIPIDEMIA TYPE: ICD-10-CM

## 2023-09-11 PROCEDURE — 99214 OFFICE O/P EST MOD 30 MIN: CPT | Performed by: STUDENT IN AN ORGANIZED HEALTH CARE EDUCATION/TRAINING PROGRAM

## 2023-09-11 RX ORDER — METOPROLOL SUCCINATE 25 MG/1
25 TABLET, EXTENDED RELEASE ORAL DAILY
Qty: 90 TABLET | Refills: 1 | Status: SHIPPED | OUTPATIENT
Start: 2023-09-11 | End: 2023-09-15 | Stop reason: SDUPTHER

## 2023-09-11 RX ORDER — DULAGLUTIDE 1.5 MG/.5ML
1.5 INJECTION, SOLUTION SUBCUTANEOUS
Qty: 6 ML | Refills: 1 | Status: SHIPPED | OUTPATIENT
Start: 2023-09-11 | End: 2023-09-15 | Stop reason: SDUPTHER

## 2023-09-11 NOTE — PROGRESS NOTES
Michelle Victoria 60 y.o. female MRN: [id number]    Encounter: [id number]      Assessment/Plan     1. Type 2 diabetes - not at goal, worsening. Increase trulicity 1.5 mg weekly. Continue metformin. Continue dietary and lifestyle plan. Continue SMBG monitoring. Will arrange for follow up lab monitoring of a1c. Call sooner with concerns. 2. Hypertension - not at goal. Start metoprolol XL 25 mg daily. Monitor ambulatory BP. Call clinic if concerns    3. Hyperlipidemia - continue statin     4. Adrenal myelolipoma - stable    Problem List Items Addressed This Visit        Cardiovascular and Mediastinum    Primary hypertension    Relevant Medications    metoprolol succinate (TOPROL-XL) 25 mg 24 hr tablet   Other Visit Diagnoses     Type 2 diabetes mellitus with hyperglycemia, without long-term current use of insulin (HCC)    -  Primary    Relevant Medications    dulaglutide (Trulicity) 1.5 RP/9.2OY injection    Other Relevant Orders    Basic metabolic panel Lab Collect    HEMOGLOBIN A1C W/ EAG ESTIMATION Lab Collect    Diabetic retinopathy associated with type 2 diabetes mellitus, macular edema presence unspecified, unspecified laterality, unspecified retinopathy severity (HCC)        Relevant Medications    dulaglutide (Trulicity) 1.5 AH/4.8MJ injection    Myelolipoma        Hyperlipidemia, unspecified hyperlipidemia type            RTC 4-mo    CC: Diabetes    History of Present Illness     HPI:    Thanh Navarrete returns today in follow up of type 2. Overall, doing well. She is disappointed by worsening a1c, but acknowledges that in last few months her treatment plan was not consistent, with her starting and stopping mounjaro and resuming trulicity. She is presently on metformin 1g bid, trulicity 5.30 mg weekly. Mounjaro not tolerated due to rash. BG by self-recall fasting 130 - 140, daytime never >180. No hyperglycemic symptoms. No hypoglycemia. Thanh Navarrete has also been walking 7500 steps per day.  Her goal is to advance steps up to 10k per day. She has an upcoming plan to travel to [facility] on cruise. She is having some nasal congestion and we discussed using nasal lavage. For hypertension she takes valsartan-hctz 160-12.5 mg daily, amlodipine 5 mg daily. BP remains elevated. Pershing Aase mentions family member on BB with good outcome. For hyperlipidemia she takes lipitor 20 mg daily. Review of Systems   Constitutional: Negative for diaphoresis and unexpected weight change. Gastrointestinal: Negative for nausea and vomiting. Endocrine: Negative for polydipsia and polyuria. All other systems reviewed and are negative.       Historical Information   Past Medical History:   Diagnosis Date   • Diabetes mellitus ([address])    • Ear problems Year and a half   • High cholesterol    • Hypertension    • Sinusitis      Past Surgical History:   Procedure Laterality Date   • HYSTERECTOMY     • TONSILLECTOMY  5 years opd     Social History   Social History     Substance and Sexual Activity   Alcohol Use Never     Social History     Substance and Sexual Activity   Drug Use Never     Social History     Tobacco Use   Smoking Status Former   • Packs/day: 1.50   • Years: 15.00   • Total pack years: 22.50   • Types: Cigarettes   • Start date: 1984   • Quit date: 4/10/2010   • Years since quittin.4   Smokeless Tobacco Never     Family History:   Family History   Problem Relation Age of Onset   • Diabetes Mother    • Diabetes Father    • Hypertension Father        Meds/Allergies   Current Outpatient Medications   Medication Sig Dispense Refill   • Accu-Chek Guide test strip TEST TWICE DAILY FOR FLUCTUATING BLOOD SUGARS 100 strip 3   • amLODIPine (NORVASC) 5 mg tablet Take 1 tablet (5 mg total) by mouth daily 90 tablet 1   • aspirin (Adult Aspirin Regimen) 81 mg EC tablet Take 1 tablet (81 mg total) by mouth daily 90 tablet 1   • atorvastatin (LIPITOR) 20 mg tablet Take 1 tablet (20 mg total) by mouth daily 90 tablet 3   • dulaglutide (Trulicity) 1.5 BS/7.8FF injection Inject 0.5 mL (1.5 mg total) under the skin every 7 days 6 mL 1   • fluticasone (FLONASE) 50 mcg/act nasal spray 2 sprays into each nostril daily 46.5 mL 0   • folic acid (FOLVITE) 471 MCG tablet Take 0.5 tablets (400 mcg total) by mouth daily 90 tablet 1   • metFORMIN (GLUCOPHAGE) 1000 MG tablet Take 1 tablet (1,000 mg total) by mouth 2 (two) times a day with meals 180 tablet 1   • metoprolol succinate (TOPROL-XL) 25 mg 24 hr tablet Take 1 tablet (25 mg total) by mouth daily 90 tablet 1   • valsartan-hydrochlorothiazide (DIOVAN-HCT) 160-12.5 MG per tablet TAKE ONE TABLET BY MOUTH EVERY DAY 90 tablet 1     No current facility-administered medications for this visit. Allergies   Allergen Reactions   • Jardiance [Empagliflozin] GI Intolerance   • Ozempic (0.25 Or 0.5 Mg-Dose) [Semaglutide(0.25 Or 0.5mg-Dos)] GI Intolerance       Objective   Vitals: Blood pressure 160/92, pulse (!) 118, height 5' 11" (1.803 m), weight 95.3 kg (210 lb). Physical Exam  Vitals reviewed. Constitutional:       General: She is not in acute distress. Appearance: Normal appearance. HENT:      Head: Normocephalic and atraumatic. Nose: Nose normal.   Eyes:      General: No scleral icterus. Conjunctiva/sclera: Conjunctivae normal.   Pulmonary:      Effort: Pulmonary effort is normal. No respiratory distress. Musculoskeletal:         General: Normal range of motion. Skin:     General: Skin is warm and dry. Neurological:      General: No focal deficit present. Mental Status: She is alert. Psychiatric:         Mood and Affect: Mood normal.         Behavior: Behavior normal.         The history was obtained from the review of the chart, patient.     Lab Results:   Lab Results   Component Value Date/Time    Hemoglobin A1C 7.6 (H) 08/15/2023 07:03 AM    Hemoglobin A1C 7.2 (H) 05/15/2023 07:09 AM    Hemoglobin A1C 6.0 (H) 02/01/2023 07:06 AM    WBC 8.10 10/22/2022 04:51 AM    WBC 10.44 (H) 10/21/2022 07:17 AM    Hemoglobin 14.2 10/22/2022 04:51 AM    Hemoglobin 16.4 (H) 10/21/2022 07:17 AM    Hematocrit 42.8 10/22/2022 04:51 AM    Hematocrit 49.2 (H) 10/21/2022 07:17 AM    MCV 97 10/22/2022 04:51 AM    MCV 97 10/21/2022 07:17 AM    Platelets 349 89/68/2883 04:51 AM    Platelets 940 56/36/0233 07:17 AM    BUN 23 08/15/2023 07:03 AM    BUN 23 05/15/2023 07:09 AM    BUN 17 02/01/2023 07:06 AM    Potassium 4.0 08/15/2023 07:03 AM    Potassium 4.0 05/15/2023 07:09 AM    Potassium 4.3 02/01/2023 07:06 AM    Chloride 106 08/15/2023 07:03 AM    Chloride 109 (H) 05/15/2023 07:09 AM    Chloride 109 (H) 02/01/2023 07:06 AM    CO2 25 08/15/2023 07:03 AM    CO2 26 05/15/2023 07:09 AM    CO2 25 02/01/2023 07:06 AM    Creatinine 0.75 08/15/2023 07:03 AM    Creatinine 0.75 05/15/2023 07:09 AM    Creatinine 0.67 02/01/2023 07:06 AM    AST 17 10/22/2022 04:51 AM    AST 16 10/21/2022 07:17 AM    ALT 29 10/22/2022 04:51 AM    ALT 31 10/21/2022 07:17 AM    Total Protein 6.6 10/22/2022 04:51 AM    Total Protein 8.1 10/21/2022 07:17 AM    Albumin 3.7 10/22/2022 04:51 AM    Albumin 4.7 10/21/2022 07:17 AM    HDL, Direct 64 02/01/2023 07:06 AM    Triglycerides 122 02/01/2023 07:06 AM       Component      Latest Ref Rng 2/1/2023   Renin      ng/mL/hr COMMENT    Aldosterone      0.0 - 30.0 ng/dL 3.0    ZARI/PRA RATIO COMMENT            Imaging Studies: I have personally reviewed pertinent reports. Portions of the record may have been created with voice recognition software. Occasional wrong word or "sound a like" substitutions may have occurred due to the inherent limitations of voice recognition software. Read the chart carefully and recognize, using context, where substitutions have occurred.

## 2023-09-11 NOTE — PROGRESS NOTES
Elevated hemoglobin (HCC)Noted 10/21/2022  [D58.2]  [address]  coding opportunities          Chart Reviewed number of suggestions sent to Provider: 1     Patients Insurance        Commercial Insurance: [employer]ico [employer]ar

## 2023-09-11 NOTE — PATIENT INSTRUCTIONS
Nasal Rinse   WHAT YOU NEED TO KNOW:   A nasal rinse is the use of a salt water solution to thin mucus, and clear irritants and allergens from your nose. Irritants and allergens cause symptoms such as congestion, runny nose, or postnasal drip. A nasal rinse can also help to remove bacteria and viruses that cause infections. It may also be recommended after certain nasal surgeries to promote healing. DISCHARGE INSTRUCTIONS:   Supplies needed: You can purchase a nasal rinse kit at a pharmacy. A nasal rinse kit provides all the supplies you need. You can also make salt water solution at home. A bulb syringe, sinus rinse bottle, nasal cup, or neti pot can be used to put the salt water into your nose. A sinus  device can also be used to put the salt water into your nose. Ask your healthcare provider which method is best for you. How to make salt water solution at home:  Wash your hands with soap and water before you begin. Do the following to make the salt water solution at home:  Mix ½ teaspoon of uniodized salt with 8 ounces of lukewarm water. Use only  distilled water, sterile water, or filtered water. Water should be filtered through a filter with a pore size of 1 micron or smaller. You can also use water that has been boiled for 1 minute and left to cool. If you live at an elevation over 6,500 feet, boil the water for 3 minutes. Add ¼ teaspoon of baking soda to the solution and mix until it completely dissolves. Make a new mixture of salt water solution each time you do a nasal rinse. How to do a nasal rinse:  Ask your healthcare provider how often you should do a nasal rinse. You may need to do a rinse 1 or 2 times each day. If you have a nasal rinse kit, follow the directions in the package. If you made your own salt water solution, put the solution into the nasal cup, neti pot, or nasal rinse bottle.  To fill the bulb syringe, squeeze the bulb syringe gently in the solution and let it fill by suction. If you are using a sinus  device, pour the salt water into the water reservoir. Lean over the sink with your head facing down. Tilt your head sideways. To use a bulb syringe or rinse bottle , insert the tip into your upper nostril. Aim the tip away from the middle of your nose. Breathe through your mouth. Squeeze the syringe or bottle until a gentle stream of salt water goes into your nose and drains out of the lower nostril. Repeat in the other nostril. To use a sinus  device , set the device to the lowest pressure setting. Breathe through your mouth. Insert the tip inside your upper nostril. Aim the tip away from the middle of your nose. Allow the fluid to go into your nose and drain out of the lower nostril. Repeat in the other nostril. To use a neti pot or nasal cup , place the spout into your upper nostril. Breathe through your mouth. Gently pour the salt water into your upper nostril and allow it to drain into the lower nostril. Repeat in the other nostril. You may gently blow your nose to remove any extra fluid. Throw away any unused salt water solution. Clean your nasal rinse supplies after each use to prevent the growth of bacteria. How to clean nasal rinse supplies:   Clean the bulb syringe  by filling it with water and swishing the water around. Empty the water. Next, draw rubbing alcohol into the bulb syringe and swish the alcohol around. Empty the bulb syringe. Place the bulb in a clean empty glass with the tip facing down to allow it to drain completely. Clean the sinus rinse bottle  and its parts with a small amount of dishwashing soap and water. Rinse the bottle and cap with water. If your sinus rinse bottle is microwave safe, fill it with cold water and heat it in the microwave for 2 minutes. Allow the bottle and cap to dry on a clean towel. If the sinus rinse bottle and cap appear discolored, disinfect them.  To do this, rinse them with rubbing alcohol or vinegar solution. To make the vinegar solution, mix 1 part vinegar to 3 parts water. Clean the neti pot or nasal cup  with soap and water. Leave the device open so it can air dry completely. © Copyright Viktoriya Perez 2022 Information is for End User's use only and may not be sold, redistributed or otherwise used for commercial purposes. The above information is an  only. It is not intended as medical advice for individual conditions or treatments. Talk to your doctor, nurse or pharmacist before following any medical regimen to see if it is safe and effective for you.

## 2023-09-15 ENCOUNTER — OFFICE VISIT (OUTPATIENT)
Dept: FAMILY MEDICINE CLINIC | Facility: CLINIC | Age: 58
End: 2023-09-15
Payer: COMMERCIAL

## 2023-09-15 VITALS
DIASTOLIC BLOOD PRESSURE: 90 MMHG | OXYGEN SATURATION: 99 % | WEIGHT: 206.2 LBS | TEMPERATURE: 98.3 F | HEART RATE: 116 BPM | BODY MASS INDEX: 28.87 KG/M2 | HEIGHT: 71 IN | SYSTOLIC BLOOD PRESSURE: 146 MMHG

## 2023-09-15 DIAGNOSIS — E11.9 TYPE 2 DIABETES MELLITUS WITHOUT COMPLICATION, WITHOUT LONG-TERM CURRENT USE OF INSULIN (HCC): ICD-10-CM

## 2023-09-15 DIAGNOSIS — E78.2 MIXED HYPERLIPIDEMIA: ICD-10-CM

## 2023-09-15 DIAGNOSIS — J30.9 ALLERGIC RHINITIS, UNSPECIFIED SEASONALITY, UNSPECIFIED TRIGGER: Primary | ICD-10-CM

## 2023-09-15 DIAGNOSIS — Z28.21 REFUSED INFLUENZA VACCINE: ICD-10-CM

## 2023-09-15 DIAGNOSIS — Z00.00 HEALTHCARE MAINTENANCE: ICD-10-CM

## 2023-09-15 DIAGNOSIS — I10 PRIMARY HYPERTENSION: ICD-10-CM

## 2023-09-15 PROBLEM — D58.2 ELEVATED HEMOGLOBIN (HCC): Status: RESOLVED | Noted: 2022-10-21 | Resolved: 2023-09-15

## 2023-09-15 PROBLEM — R19.7 DIARRHEA: Status: RESOLVED | Noted: 2022-10-21 | Resolved: 2023-09-15

## 2023-09-15 PROBLEM — I25.10 CORONARY ARTERY CALCIFICATION SEEN ON CAT SCAN: Status: RESOLVED | Noted: 2022-11-22 | Resolved: 2023-09-15

## 2023-09-15 PROBLEM — R77.8 ELEVATED TROPONIN LEVEL: Status: RESOLVED | Noted: 2022-10-22 | Resolved: 2023-09-15

## 2023-09-15 PROBLEM — E78.00 HYPERCHOLESTEROLEMIA: Status: RESOLVED | Noted: 2022-10-22 | Resolved: 2023-09-15

## 2023-09-15 PROBLEM — R79.89 ELEVATED TROPONIN LEVEL: Status: RESOLVED | Noted: 2022-10-22 | Resolved: 2023-09-15

## 2023-09-15 PROCEDURE — 99214 OFFICE O/P EST MOD 30 MIN: CPT | Performed by: PHYSICIAN ASSISTANT

## 2023-09-15 RX ORDER — VALSARTAN AND HYDROCHLOROTHIAZIDE 160; 12.5 MG/1; MG/1
1 TABLET, FILM COATED ORAL DAILY
Qty: 90 TABLET | Refills: 1 | Status: SHIPPED | OUTPATIENT
Start: 2023-09-15

## 2023-09-15 RX ORDER — ATORVASTATIN CALCIUM 20 MG/1
20 TABLET, FILM COATED ORAL DAILY
Qty: 90 TABLET | Refills: 3 | Status: SHIPPED | OUTPATIENT
Start: 2023-09-15

## 2023-09-15 RX ORDER — UREA 10 %
400 LOTION (ML) TOPICAL DAILY
Qty: 90 TABLET | Refills: 1 | Status: SHIPPED | OUTPATIENT
Start: 2023-09-15

## 2023-09-15 RX ORDER — AMLODIPINE BESYLATE 5 MG/1
5 TABLET ORAL DAILY
Qty: 90 TABLET | Refills: 1 | Status: SHIPPED | OUTPATIENT
Start: 2023-09-15

## 2023-09-15 RX ORDER — ASPIRIN 81 MG/1
81 TABLET ORAL DAILY
Qty: 90 TABLET | Refills: 1 | Status: SHIPPED | OUTPATIENT
Start: 2023-09-15

## 2023-09-15 RX ORDER — FLUTICASONE PROPIONATE 50 MCG
2 SPRAY, SUSPENSION (ML) NASAL DAILY
Qty: 11.1 ML | Refills: 0 | Status: SHIPPED | OUTPATIENT
Start: 2023-09-15

## 2023-09-15 RX ORDER — METOPROLOL SUCCINATE 25 MG/1
25 TABLET, EXTENDED RELEASE ORAL DAILY
Qty: 90 TABLET | Refills: 1 | Status: SHIPPED | OUTPATIENT
Start: 2023-09-15

## 2023-09-15 RX ORDER — BLOOD SUGAR DIAGNOSTIC
STRIP MISCELLANEOUS
Qty: 100 STRIP | Refills: 3 | Status: SHIPPED | OUTPATIENT
Start: 2023-09-15

## 2023-09-15 RX ORDER — DULAGLUTIDE 1.5 MG/.5ML
1.5 INJECTION, SOLUTION SUBCUTANEOUS
Qty: 6 ML | Refills: 1 | Status: SHIPPED | OUTPATIENT
Start: 2023-09-15

## 2023-09-15 NOTE — ASSESSMENT & PLAN NOTE
Continue to follow with endo, continue current treatment as A1C is good.     Lab Results   Component Value Date    HGBA1C 7.6 (H) 08/15/2023

## 2023-09-15 NOTE — PROGRESS NOTES
Name: Tejal Medina      : 1965      MRN: [id number]  Encounter Provider: Ron Velez PA-C  Encounter Date: 9/15/2023   Encounter department: [address]     1. Allergic rhinitis, unspecified seasonality, unspecified trigger  Assessment & Plan:  Continue to use Flonase NS daily PRN. Orders:  -     fluticasone (FLONASE) 50 mcg/act nasal spray; 2 sprays into each nostril daily    2. Type 2 diabetes mellitus without complication, without long-term current use of insulin ([address])  Assessment & Plan:  Continue to follow with endo, continue current treatment as A1C is good. Lab Results   Component Value Date    HGBA1C 7.6 (H) 08/15/2023       Orders:  -     glucose blood (Accu-Chek Guide) test strip; Use as instructed  -     dulaglutide (Trulicity) 1.5 OR/5.9MB injection; Inject 0.5 mL (1.5 mg total) under the skin every 7 days  -     metFORMIN (GLUCOPHAGE) 1000 MG tablet; Take 1 tablet (1,000 mg total) by mouth 2 (two) times a day with meals    3. Primary hypertension  Assessment & Plan:  Relatively stable, however pt very stressed currently with work. Recommend monitor at home. Orders:  -     amLODIPine (NORVASC) 5 mg tablet; Take 1 tablet (5 mg total) by mouth daily  -     metoprolol succinate (TOPROL-XL) 25 mg 24 hr tablet; Take 1 tablet (25 mg total) by mouth daily  -     valsartan-hydrochlorothiazide (DIOVAN-HCT) 160-12.5 MG per tablet; Take 1 tablet by mouth daily    4. Healthcare maintenance  Assessment & Plan:  Pt to continue daily ASA. Orders:  -     aspirin (Adult Aspirin Regimen) 81 mg EC tablet; Take 1 tablet (81 mg total) by mouth daily  -     folic acid (FOLVITE) 635 MCG tablet; Take 0.5 tablets (400 mcg total) by mouth daily    5. Mixed hyperlipidemia  Assessment & Plan:  Continue taking atorvastatin daily. Pt to eat low fat low chol foods. Orders:  -     atorvastatin (LIPITOR) 20 mg tablet; Take 1 tablet (20 mg total) by mouth daily    6.  Refused influenza vaccine  Assessment & Plan:  Pt declines influenza vaccination today, will get later in the season. Depression Screening and Follow-up Plan: Patient was screened for depression during today's encounter. They screened negative with a PHQ-2 score of 0. Terry Moise is here today for 6 month follow up. She has been under stress at work but otherwise doing ok. She has an upcoming cruise to the [employer]. Review of Systems   Constitutional: Negative for chills and fever. HENT: Negative for ear pain and sore throat. Eyes: Negative for pain and visual disturbance. Respiratory: Negative for cough and shortness of breath. Cardiovascular: Negative for chest pain and palpitations. Gastrointestinal: Negative for abdominal pain and vomiting. Genitourinary: Negative for dysuria and hematuria. Musculoskeletal: Negative for arthralgias and back pain. Skin: Negative for color change and rash. Neurological: Negative for seizures and syncope. All other systems reviewed and are negative.       Current Outpatient Medications on File Prior to Visit   Medication Sig   • [DISCONTINUED] Accu-Chek Guide test strip TEST TWICE DAILY FOR FLUCTUATING BLOOD SUGARS   • [DISCONTINUED] amLODIPine (NORVASC) 5 mg tablet Take 1 tablet (5 mg total) by mouth daily   • [DISCONTINUED] aspirin (Adult Aspirin Regimen) 81 mg EC tablet Take 1 tablet (81 mg total) by mouth daily   • [DISCONTINUED] atorvastatin (LIPITOR) 20 mg tablet Take 1 tablet (20 mg total) by mouth daily   • [DISCONTINUED] dulaglutide (Trulicity) 1.5 DG/5.4DV injection Inject 0.5 mL (1.5 mg total) under the skin every 7 days   • [DISCONTINUED] fluticasone (FLONASE) 50 mcg/act nasal spray 2 sprays into each nostril daily   • [DISCONTINUED] folic acid (FOLVITE) 979 MCG tablet Take 0.5 tablets (400 mcg total) by mouth daily   • [DISCONTINUED] metFORMIN (GLUCOPHAGE) 1000 MG tablet Take 1 tablet (1,000 mg total) by mouth 2 (two) times a day with meals   • [DISCONTINUED] metoprolol succinate (TOPROL-XL) 25 mg 24 hr tablet Take 1 tablet (25 mg total) by mouth daily   • [DISCONTINUED] valsartan-hydrochlorothiazide (DIOVAN-HCT) 160-12.5 MG per tablet TAKE ONE TABLET BY MOUTH EVERY DAY       Objective     /90   Pulse (!) 116   Temp 98.3 °F (36.8 °C)   Ht 5' 11" (1.803 m)   Wt 93.5 kg (206 lb 3.2 oz)   SpO2 99%   BMI 28.76 kg/m²     Physical Exam  Vitals reviewed. Constitutional:       General: She is not in acute distress. Appearance: She is well-developed. She is not diaphoretic. HENT:      Head: Normocephalic and atraumatic. Right Ear: Hearing, tympanic membrane, ear canal and external ear normal.      Left Ear: Hearing, tympanic membrane, ear canal and external ear normal.      Mouth/Throat:      Pharynx: Uvula midline. No oropharyngeal exudate. Eyes:      General: No scleral icterus. Right eye: No discharge. Left eye: No discharge. Conjunctiva/sclera: Conjunctivae normal.   Neck:      Thyroid: No thyromegaly. Vascular: No carotid bruit. Cardiovascular:      Rate and Rhythm: Normal rate and regular rhythm. Heart sounds: Normal heart sounds. No murmur heard. Pulmonary:      Effort: Pulmonary effort is normal. No respiratory distress. Breath sounds: Normal breath sounds. No wheezing. Abdominal:      General: Bowel sounds are normal. There is no distension. Palpations: Abdomen is soft. There is no mass. Tenderness: There is no abdominal tenderness. There is no guarding or rebound. Musculoskeletal:         General: No tenderness. Normal range of motion. Cervical back: Neck supple. Lymphadenopathy:      Cervical: No cervical adenopathy. Skin:     General: Skin is warm and dry. Findings: No erythema or rash. Neurological:      Mental Status: She is alert and oriented to person, place, and time.    Psychiatric:         Behavior: Behavior normal. Thought Content:  Thought content normal.         Judgment: Judgment normal.       Axel Andrea PA-C

## 2023-10-06 ENCOUNTER — TELEPHONE (OUTPATIENT)
Dept: FAMILY MEDICINE CLINIC | Facility: CLINIC | Age: 58
End: 2023-10-06

## 2023-10-06 NOTE — TELEPHONE ENCOUNTER
[facility] called to report they need an order for mammogram left side due to abnormal lump found on imaging in April. Please generate order so we can fax.

## 2023-10-09 DIAGNOSIS — R92.8 ABNORMALITY OF LEFT BREAST ON SCREENING MAMMOGRAPHY: Primary | ICD-10-CM

## 2023-11-14 PROBLEM — Z00.00 HEALTHCARE MAINTENANCE: Status: RESOLVED | Noted: 2023-09-15 | Resolved: 2023-11-14

## 2023-11-16 ENCOUNTER — VBI (OUTPATIENT)
Dept: ADMINISTRATIVE | Facility: OTHER | Age: 58
End: 2023-11-16

## 2023-12-05 ENCOUNTER — VBI (OUTPATIENT)
Dept: ADMINISTRATIVE | Facility: OTHER | Age: 58
End: 2023-12-05

## 2023-12-06 ENCOUNTER — TELEPHONE (OUTPATIENT)
Dept: FAMILY MEDICINE CLINIC | Facility: CLINIC | Age: 58
End: 2023-12-06

## 2023-12-06 DIAGNOSIS — U07.1 COVID-19: Primary | ICD-10-CM

## 2023-12-06 RX ORDER — NIRMATRELVIR AND RITONAVIR 300-100 MG
3 KIT ORAL 2 TIMES DAILY
Qty: 30 TABLET | Refills: 0 | Status: SHIPPED | OUTPATIENT
Start: 2023-12-06 | End: 2023-12-11

## 2023-12-06 NOTE — TELEPHONE ENCOUNTER
Yes, I sent a script to the pharmacy. Please have patient hold her atorvastatin while taking the Paxlovid. She should continue symptomatic relief in addition to the Paxlovid. If symptoms do not improve or worsen, she should let us know.

## 2023-12-06 NOTE — TELEPHONE ENCOUNTER
Pt called stating she woke early this morning with   - chills  -fever of 100.8  -Nasal congestion  - slight cough     She did test + for covid asking if Paxlovid can be called to [facility] pharmacy Modified Advancement Flap Text: The defect edges were debeveled with a #15 scalpel blade.  Given the location of the defect, shape of the defect and the proximity to free margins a modified advancement flap was deemed most appropriate.  Using a sterile surgical marker, an appropriate advancement flap was drawn incorporating the defect and placing the expected incisions within the relaxed skin tension lines where possible.    The area thus outlined was incised deep to adipose tissue with a #15 scalpel blade.  The skin margins were undermined to an appropriate distance in all directions utilizing iris scissors.

## 2024-01-04 ENCOUNTER — VBI (OUTPATIENT)
Dept: ADMINISTRATIVE | Facility: OTHER | Age: 59
End: 2024-01-04

## 2024-01-31 ENCOUNTER — OFFICE VISIT (OUTPATIENT)
Dept: CARDIOLOGY CLINIC | Facility: HOSPITAL | Age: 59
End: 2024-01-31
Payer: COMMERCIAL

## 2024-01-31 VITALS
DIASTOLIC BLOOD PRESSURE: 90 MMHG | SYSTOLIC BLOOD PRESSURE: 170 MMHG | BODY MASS INDEX: 30.1 KG/M2 | HEIGHT: 71 IN | WEIGHT: 215 LBS | HEART RATE: 114 BPM

## 2024-01-31 DIAGNOSIS — E11.319 DIABETIC RETINOPATHY ASSOCIATED WITH TYPE 2 DIABETES MELLITUS, MACULAR EDEMA PRESENCE UNSPECIFIED, UNSPECIFIED LATERALITY, UNSPECIFIED RETINOPATHY SEVERITY (HCC): Primary | ICD-10-CM

## 2024-01-31 DIAGNOSIS — I10 PRIMARY HYPERTENSION: ICD-10-CM

## 2024-01-31 DIAGNOSIS — I25.10 CORONARY ARTERY CALCIFICATION SEEN ON CAT SCAN: ICD-10-CM

## 2024-01-31 DIAGNOSIS — I65.23 CAROTID ARTERY STENOSIS, ASYMPTOMATIC, BILATERAL: ICD-10-CM

## 2024-01-31 PROCEDURE — 3077F SYST BP >= 140 MM HG: CPT | Performed by: INTERNAL MEDICINE

## 2024-01-31 PROCEDURE — 3080F DIAST BP >= 90 MM HG: CPT | Performed by: INTERNAL MEDICINE

## 2024-01-31 PROCEDURE — 99214 OFFICE O/P EST MOD 30 MIN: CPT | Performed by: INTERNAL MEDICINE

## 2024-01-31 PROCEDURE — 93000 ELECTROCARDIOGRAM COMPLETE: CPT | Performed by: INTERNAL MEDICINE

## 2024-01-31 RX ORDER — VALSARTAN AND HYDROCHLOROTHIAZIDE 320; 25 MG/1; MG/1
1 TABLET, FILM COATED ORAL DAILY
Qty: 90 TABLET | Refills: 3 | Status: SHIPPED | OUTPATIENT
Start: 2024-01-31

## 2024-01-31 NOTE — PROGRESS NOTES
Korin Wilhelm  1965  [id number]  [facility] [address] [address] [phone]    1. Diabetic retinopathy associated with type 2 diabetes mellitus, macular edema presence unspecified, unspecified laterality, unspecified retinopathy severity (HCC)        2. Primary hypertension  POCT ECG    valsartan-hydrochlorothiazide (DIOVAN-HCT) 320-25 MG per tablet    Basic metabolic panel      3. Carotid artery stenosis, asymptomatic, bilateral  VAS carotid complete study      4. Coronary artery calcification seen on CAT scan            Discussion/Summary: Blood pressure was elevated manual recheck came down to 170/90.  I have increased her valsartan/hydrochlorothiazide to 3 20-25.  Check a BMP in 1 to 2 weeks for follow-up on kidney function sodium and potassium.  She does have significant underlying anxiety I will defer to her primary physician for management of this.  She has coronary artery calcifications on CT she is on statin and baby aspirin.  She also has mild bilateral carotid stenosis this needs to be followed up with formal Doppler this year.  I will bring her back in 6 months.      Interval History:  58-year-old female with a history of diabetes mellitus type 2, hypertension, hyperlipidemia, recent hospital admission for DKA secondary to reaction to diabetic medication.  She presents for new patient consultation on behalf of Abbie Campos.  Overall she has been feeling well since getting out of the hospital.  She leads a very active life does a fair amount of cleaning at home and for her work.      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  There is been no chest pain, shortness of breath, palpitations, lightheadedness, dizziness, or syncope.  Blood pressure does spike at times she feels a headache and flushing in her face.  He tends to get very upset  and easily agitated.  She does have some underlying anxiety and OCD.  Medical Problems       Problem List       Type 2 diabetes mellitus with hyperglycemia, without long-term current use of insulin (HCC)      Lab Results   Component Value Date    HGBA1C 7.6 (H) 08/15/2023         Myelolipoma of right adrenal gland    Hepatomegaly    Fatty liver    Elevated hemoglobin (HCC)    Diabetic ketoacidosis without coma associated with type 2 diabetes mellitus (HCC)      Lab Results   Component Value Date    HGBA1C 7.6 (H) 08/15/2023         Diarrhea    Elevated troponin level    Essential hypertension    Hypercholesterolemia    Coronary artery calcification seen on CAT scan        Past Medical History:   Diagnosis Date    Diabetes mellitus (HCC)     Ear problems Year and a half    High cholesterol     Hypertension     Sinusitis      Social History     Socioeconomic History    Marital status: /Civil Union     Spouse name: Not on file    Number of children: Not on file    Years of education: Not on file    Highest education level: Not on file   Occupational History    Not on file   Tobacco Use    Smoking status: Former     Current packs/day: 0.00     Average packs/day: 1.5 packs/day for 25.7 years (38.5 ttl pk-yrs)     Types: Cigarettes     Start date: 1984     Quit date: 4/10/2010     Years since quittin.8    Smokeless tobacco: Never   Vaping Use    Vaping status: Never Used   Substance and Sexual Activity    Alcohol use: Never    Drug use: Never    Sexual activity: Yes     Partners: Male     Birth control/protection: None   Other Topics Concern    Not on file   Social History Narrative    Not on file     Social Determinants of Health     Financial Resource Strain: Low Risk  (3/14/2023)    Overall Financial Resource Strain (CARDIA)     Difficulty of Paying Living Expenses: Not hard at all   Food Insecurity: No Food Insecurity (10/21/2022)    Hunger Vital Sign     Worried About Running Out of Food in the Last  Year: Never true     Ran Out of Food in the Last Year: Never true   Transportation Needs: No Transportation Needs (3/14/2023)    PRAPARE - Transportation     Lack of Transportation (Medical): No     Lack of Transportation (Non-Medical): No   Physical Activity: Not on file   Stress: Not on file   Social Connections: Not on file   Intimate Partner Violence: Not on file   Housing Stability: Low Risk  (10/21/2022)    Housing Stability Vital Sign     Unable to Pay for Housing in the Last Year: No     Number of Places Lived in the Last Year: 1     Unstable Housing in the Last Year: No      Family History   Problem Relation Age of Onset    Diabetes Mother     Diabetes Father     Hypertension Father      Past Surgical History:   Procedure Laterality Date    HYSTERECTOMY      TONSILLECTOMY  5 years opd       Current Outpatient Medications:     amLODIPine (NORVASC) 5 mg tablet, Take 1 tablet (5 mg total) by mouth daily, Disp: 90 tablet, Rfl: 1    aspirin (Adult Aspirin Regimen) 81 mg EC tablet, Take 1 tablet (81 mg total) by mouth daily, Disp: 90 tablet, Rfl: 1    atorvastatin (LIPITOR) 20 mg tablet, Take 1 tablet (20 mg total) by mouth daily, Disp: 90 tablet, Rfl: 3    dulaglutide (Trulicity) 1.5 MG/0.5ML injection, Inject 0.5 mL (1.5 mg total) under the skin every 7 days, Disp: 6 mL, Rfl: 1    fluticasone (FLONASE) 50 mcg/act nasal spray, 2 sprays into each nostril daily, Disp: 11.1 mL, Rfl: 0    folic acid (FOLVITE) 800 MCG tablet, Take 0.5 tablets (400 mcg total) by mouth daily, Disp: 90 tablet, Rfl: 1    glucose blood (Accu-Chek Guide) test strip, Use as instructed, Disp: 100 strip, Rfl: 3    metFORMIN (GLUCOPHAGE) 1000 MG tablet, Take 1 tablet (1,000 mg total) by mouth 2 (two) times a day with meals, Disp: 180 tablet, Rfl: 1    metoprolol succinate (TOPROL-XL) 25 mg 24 hr tablet, Take 1 tablet (25 mg total) by mouth daily, Disp: 90 tablet, Rfl: 1    valsartan-hydrochlorothiazide (DIOVAN-HCT) 320-25 MG per tablet, Take 1  "tablet by mouth daily, Disp: 90 tablet, Rfl: 3  Allergies   Allergen Reactions    Jardiance [Empagliflozin] GI Intolerance    Ozempic (0.25 Or 0.5 Mg-Dose) [Semaglutide(0.25 Or 0.5mg-Dos)] GI Intolerance       Labs:     Chemistry        Component Value Date/Time    K 4.0 08/15/2023 0703    K 4.2 10/15/2021 0704     08/15/2023 0703     10/15/2021 0704    CO2 25 08/15/2023 0703    CO2 21 (L) 10/15/2021 0704    BUN 23 08/15/2023 0703    BUN 21 10/15/2021 0704    CREATININE 0.75 08/15/2023 0703    CREATININE 0.75 10/15/2021 0704        Component Value Date/Time    CALCIUM 10.0 08/15/2023 0703    CALCIUM 9.8 10/15/2021 0704    ALKPHOS 56 10/22/2022 0451    AST 17 10/22/2022 0451    ALT 29 10/22/2022 0451            No results found for: \"CHOL\"  Lab Results   Component Value Date    HDL 64 02/01/2023     Lab Results   Component Value Date    LDLCALC 61 02/01/2023     Lab Results   Component Value Date    TRIG 122 02/01/2023     No results found for: \"CHOLHDL\"    Imaging: No results found.    ECG:        Review of Systems   Constitutional: Negative.   HENT: Negative.     Eyes: Negative.    Cardiovascular: Negative.    Respiratory: Negative.     Endocrine: Negative.    Hematologic/Lymphatic: Negative.    Skin: Negative.    Musculoskeletal: Negative.    Gastrointestinal: Negative.    Genitourinary: Negative.    Neurological: Negative.    Psychiatric/Behavioral: Negative.     All other systems reviewed and are negative.      Vitals:    01/31/24 1015   BP: 170/90   Pulse: (!) 114     Vitals:    01/31/24 1015   Weight: 97.5 kg (215 lb)     Height: 5' 11\" (180.3 cm)   Body mass index is 29.99 kg/m².    Physical Exam:  Vital signs reviewed  General:  Alert and cooperative, appears stated age, no acute distress  HEENT:  PERRLA, EOMI, no scleral icterus, no conjunctival pallor  Neck:  No lymphadenopathy, no thyromegaly, no carotid bruits, no elevated JVP  Heart:  Regular rate and rhythm, normal S1/S2, no S3/S4, no " murmur, rubs or gallops.  PMI nondisplaced  Lungs:  Clear to auscultation bilaterally, no wheezes rales or rhonchi  Abdomen:  Soft, non-tender, positive bowel sounds, no rebound or guarding,   no organomegaly   Extremities:  Normal range of motion.  No clubbing, cyanosis or edema   Vascular:  2+ pedal pulses  Skin:  No rashes or lesions on exposed skin  Neurologic:  Cranial nerves II-XII grossly intact without focal deficits  Psych:  Normal mood and affect

## 2024-02-14 ENCOUNTER — APPOINTMENT (OUTPATIENT)
Dept: LAB | Facility: HOSPITAL | Age: 59
End: 2024-02-14
Payer: COMMERCIAL

## 2024-02-14 ENCOUNTER — HOSPITAL ENCOUNTER (OUTPATIENT)
Dept: NON INVASIVE DIAGNOSTICS | Facility: HOSPITAL | Age: 59
Discharge: HOME | End: 2024-02-14
Attending: INTERNAL MEDICINE
Payer: COMMERCIAL

## 2024-02-14 DIAGNOSIS — E11.65 TYPE 2 DIABETES MELLITUS WITH HYPERGLYCEMIA, WITHOUT LONG-TERM CURRENT USE OF INSULIN (HCC): ICD-10-CM

## 2024-02-14 DIAGNOSIS — I65.23 CAROTID ARTERY STENOSIS, ASYMPTOMATIC, BILATERAL: ICD-10-CM

## 2024-02-14 DIAGNOSIS — I10 PRIMARY HYPERTENSION: ICD-10-CM

## 2024-02-14 LAB
ANION GAP SERPL CALCULATED.3IONS-SCNC: 10 MMOL/L
BUN SERPL-MCNC: 20 MG/DL (ref 5–25)
CALCIUM SERPL-MCNC: 9.9 MG/DL (ref 8.4–10.2)
CHLORIDE SERPL-SCNC: 102 MMOL/L (ref 96–108)
CO2 SERPL-SCNC: 26 MMOL/L (ref 21–32)
CREAT SERPL-MCNC: 0.73 MG/DL (ref 0.6–1.3)
GFR SERPL CREATININE-BSD FRML MDRD: 90 ML/MIN/1.73SQ M
GLUCOSE P FAST SERPL-MCNC: 176 MG/DL (ref 65–99)
POTASSIUM SERPL-SCNC: 3.9 MMOL/L (ref 3.5–5.3)
SODIUM SERPL-SCNC: 138 MMOL/L (ref 135–147)

## 2024-02-14 PROCEDURE — 93880 EXTRACRANIAL BILAT STUDY: CPT

## 2024-02-14 PROCEDURE — 36415 COLL VENOUS BLD VENIPUNCTURE: CPT

## 2024-02-14 PROCEDURE — 83036 HEMOGLOBIN GLYCOSYLATED A1C: CPT

## 2024-02-14 PROCEDURE — 93880 EXTRACRANIAL BILAT STUDY: CPT | Performed by: SURGERY

## 2024-02-14 PROCEDURE — 80048 BASIC METABOLIC PNL TOTAL CA: CPT

## 2024-02-15 LAB
EST. AVERAGE GLUCOSE BLD GHB EST-MCNC: 197 MG/DL
HBA1C MFR BLD: 8.5 %

## 2024-02-27 ENCOUNTER — HOSPITAL ENCOUNTER (EMERGENCY)
Facility: HOSPITAL | Age: 59
Discharge: HOME | End: 2024-02-27
Attending: EMERGENCY MEDICINE
Payer: COMMERCIAL

## 2024-02-27 VITALS
DIASTOLIC BLOOD PRESSURE: 81 MMHG | RESPIRATION RATE: 17 BRPM | HEART RATE: 88 BPM | SYSTOLIC BLOOD PRESSURE: 177 MMHG | OXYGEN SATURATION: 97 % | TEMPERATURE: 98 F

## 2024-02-27 DIAGNOSIS — I10 HYPERTENSION: Primary | ICD-10-CM

## 2024-02-27 PROCEDURE — 99283 EMERGENCY DEPT VISIT LOW MDM: CPT

## 2024-02-27 PROCEDURE — 93005 ELECTROCARDIOGRAM TRACING: CPT

## 2024-02-27 PROCEDURE — 99284 EMERGENCY DEPT VISIT MOD MDM: CPT | Performed by: EMERGENCY MEDICINE

## 2024-02-27 NOTE — ED PROVIDER NOTES
History  Chief Complaint   Patient presents with    Hypertension     Pt states she woke up with a pounding in her chest and it resolved. Pt states she went on with her normal morning when she started to get a headache and checked her BP, pt states her BP was 190/110. Pt denies chest pain or dizziness      Patient is a very pleasant 58-year-old female presenting with concern about elevated blood pressure.  Patient states she was recently at her cardiologist who increased her dose of hydrochlorothiazide.  Patient currently takes metoprolol 25 mg once daily, amlodipine 5 mg once daily as well as valsartan hydrochlorothiazide 320/25 mg once daily.  She states she been compliant with that medication regiment.  Denies any recent injury or illness.  Denies any fevers or chills, chest pain or shortness of breath.  She states she awoke this morning and felt a pounding sensation in her head and checked her blood pressure and was elevated in the systolic 190s region.  Patient states the pounding sensation in her head has resolved.  No focal motor or sensory neurological deficits.        Prior to Admission Medications   Prescriptions Last Dose Informant Patient Reported? Taking?   amLODIPine (NORVASC) 5 mg tablet 2024  No Yes   Sig: Take 1 tablet (5 mg total) by mouth daily   aspirin (Adult Aspirin Regimen) 81 mg EC tablet 2024  No Yes   Sig: Take 1 tablet (81 mg total) by mouth daily   atorvastatin (LIPITOR) 20 mg tablet 2024  No Yes   Sig: Take 1 tablet (20 mg total) by mouth daily   dulaglutide (Trulicity) 1.5 MG/0.5ML injection 2024  No Yes   Sig: Inject 0.5 mL (1.5 mg total) under the skin every 7 days   fluticasone (FLONASE) 50 mcg/act nasal spray 2024  No Yes   Si sprays into each nostril daily   folic acid (FOLVITE) 800 MCG tablet 2024  No Yes   Sig: Take 0.5 tablets (400 mcg total) by mouth daily   glucose blood (Accu-Chek Guide) test strip 2024  No Yes   Sig: Use as instructed    metFORMIN (GLUCOPHAGE) 1000 MG tablet 2024  No Yes   Sig: Take 1 tablet (1,000 mg total) by mouth 2 (two) times a day with meals   metoprolol succinate (TOPROL-XL) 25 mg 24 hr tablet 2024  No Yes   Sig: Take 1 tablet (25 mg total) by mouth daily   valsartan-hydrochlorothiazide (DIOVAN-HCT) 320-25 MG per tablet 2024  No Yes   Sig: Take 1 tablet by mouth daily      Facility-Administered Medications: None       Past Medical History:   Diagnosis Date    Diabetes mellitus (HCC)     Ear problems Year and a half    High cholesterol     Hypertension     Sinusitis        Past Surgical History:   Procedure Laterality Date    HYSTERECTOMY      TONSILLECTOMY  5 years opd       Family History   Problem Relation Age of Onset    Diabetes Mother     Diabetes Father     Hypertension Father      I have reviewed and agree with the history as documented.    E-Cigarette/Vaping    E-Cigarette Use Never User      E-Cigarette/Vaping Substances     Social History     Tobacco Use    Smoking status: Former     Current packs/day: 0.00     Average packs/day: 1.5 packs/day for 25.7 years (38.5 ttl pk-yrs)     Types: Cigarettes     Start date: 1984     Quit date: 4/10/2010     Years since quittin.8    Smokeless tobacco: Never   Vaping Use    Vaping status: Never Used   Substance Use Topics    Alcohol use: Never    Drug use: Never       Review of Systems   Constitutional:  Negative for appetite change, chills, fatigue, fever and unexpected weight change.   HENT:  Negative for congestion, ear pain, rhinorrhea and sore throat.    Eyes:  Negative for pain and visual disturbance.   Respiratory:  Negative for cough, chest tightness, shortness of breath and wheezing.    Cardiovascular:  Negative for chest pain, palpitations and leg swelling.   Gastrointestinal:  Negative for abdominal pain, constipation, diarrhea, nausea and vomiting.   Genitourinary:  Negative for difficulty urinating, dysuria, frequency, hematuria, menstrual  problem, pelvic pain, vaginal bleeding and vaginal discharge.   Musculoskeletal:  Negative for arthralgias, back pain and neck pain.   Skin:  Negative for color change and rash.   Neurological:  Positive for headaches. Negative for dizziness, seizures, syncope and light-headedness.        Headache described as a pounding sensation but no discomfort.   Psychiatric/Behavioral:  Negative for sleep disturbance.    All other systems reviewed and are negative.      Physical Exam  Physical Exam  Vitals and nursing note reviewed.   Constitutional:       General: She is not in acute distress.     Appearance: Normal appearance. She is well-developed and normal weight. She is not ill-appearing, toxic-appearing or diaphoretic.   HENT:      Head: Normocephalic and atraumatic.      Nose: Nose normal.      Mouth/Throat:      Mouth: Mucous membranes are moist.      Pharynx: Oropharynx is clear.   Eyes:      General: No scleral icterus.     Extraocular Movements: Extraocular movements intact.      Conjunctiva/sclera: Conjunctivae normal.   Neck:      Vascular: No carotid bruit.   Cardiovascular:      Rate and Rhythm: Normal rate and regular rhythm.      Pulses: Normal pulses.      Heart sounds: Normal heart sounds. No murmur heard.     No friction rub. No gallop.   Pulmonary:      Effort: Pulmonary effort is normal. No respiratory distress.      Breath sounds: Normal breath sounds. No wheezing or rales.   Chest:      Chest wall: No tenderness.   Abdominal:      General: Bowel sounds are normal. There is no distension.      Palpations: Abdomen is soft. There is no mass.      Tenderness: There is no abdominal tenderness. There is no right CVA tenderness, left CVA tenderness, guarding or rebound.      Hernia: No hernia is present.   Musculoskeletal:         General: No tenderness or deformity. Normal range of motion.      Cervical back: Normal range of motion and neck supple. No rigidity or tenderness.      Right lower leg: No edema.       Left lower leg: No edema.   Lymphadenopathy:      Cervical: No cervical adenopathy.   Skin:     General: Skin is warm and dry.      Capillary Refill: Capillary refill takes less than 2 seconds.      Coloration: Skin is not jaundiced or pale.      Findings: No bruising, erythema, lesion or rash.   Neurological:      General: No focal deficit present.      Mental Status: She is alert and oriented to person, place, and time. Mental status is at baseline.      Cranial Nerves: No cranial nerve deficit.      Motor: No weakness.      Gait: Gait normal.   Psychiatric:         Mood and Affect: Mood normal.         Behavior: Behavior normal.         Thought Content: Thought content normal.         Judgment: Judgment normal.         Vital Signs  ED Triage Vitals [02/27/24 0815]   Temperature Pulse Respirations Blood Pressure SpO2   98 °F (36.7 °C) 87 19 (!) 183/88 98 %      Temp Source Heart Rate Source Patient Position - Orthostatic VS BP Location FiO2 (%)   Temporal Monitor Sitting Right arm --      Pain Score       No Pain           Vitals:    02/27/24 0815 02/27/24 0900   BP: (!) 183/88 (!) 177/81   Pulse: 87 88   Patient Position - Orthostatic VS: Sitting Sitting         Visual Acuity      ED Medications  Medications - No data to display    Diagnostic Studies  Results Reviewed       None                   No orders to display              Procedures  ECG 12 Lead Documentation Only    Date/Time: 2/27/2024 9:44 AM    Performed by: Jorden Richardson DO  Authorized by: Jorden Richardson DO    Indications / Diagnosis:  HTN  ECG reviewed by me, the ED Provider: yes    Patient location:  ED  Previous ECG:     Comparison to cardiac monitor: Yes    Rate:     ECG rate:  98    ECG rate assessment: normal    Rhythm:     Rhythm: sinus rhythm    Ectopy:     Ectopy: none    QRS:     QRS axis:  Normal    QRS intervals:  Normal  Conduction:     Conduction: normal    ST segments:     ST segments:  Normal  T waves:     T  waves: normal             ED Course                               SBIRT 20yo+      Flowsheet Row Most Recent Value   Initial Alcohol Screen: US AUDIT-C     1. How often do you have a drink containing alcohol? 0 Filed at: 02/27/2024 0802   2. How many drinks containing alcohol do you have on a typical day you are drinking?  0 Filed at: 02/27/2024 0802   3a. Male UNDER 65: How often do you have five or more drinks on one occasion? 0 Filed at: 02/27/2024 0802   3b. FEMALE Any Age, or MALE 65+: How often do you have 4 or more drinks on one occassion? 0 Filed at: 02/27/2024 0802   Audit-C Score 0 Filed at: 02/27/2024 0802   HUMAIRA: How many times in the past year have you...    Used an illegal drug or used a prescription medication for non-medical reasons? Never Filed at: 02/27/2024 0802                      Medical Decision Making  Patient is a pleasant 58-year-old female presenting for evaluation of elevated blood pressure.    Problems Addressed:  Hypertension: chronic illness or injury    Amount and/or Complexity of Data Reviewed  External Data Reviewed: labs and notes.  Discussion of management or test interpretation with external provider(s): Discussed with cardiology - WILL follow up outpatient    Risk  Prescription drug management.  Risk Details: HTN improved without intervention. Continue current regimen.             Disposition  Final diagnoses:   Hypertension     Time reflects when diagnosis was documented in both MDM as applicable and the Disposition within this note       Time User Action Codes Description Comment    2/27/2024  9:57 AM Jorden Richardson Add [I10] Hypertension           ED Disposition       ED Disposition   Discharge    Condition   Stable    Date/Time   Tue Feb 27, 2024 0957    Comment   Korin Wilhelm discharge to home/self care.                   Follow-up Information       Follow up With Specialties Details Why Contact Info    Abbie Campos PA-C Physician Assistant Schedule an appointment  as soon as possible for a visit   [address] [facility] [address]  [phone]      Rodriguez Hahn DO Cardiology, Multidisciplinary Schedule an appointment as soon as possible for a visit   [address] [facility] [address]  [phone]              Discharge Medication List as of 2/27/2024  9:58 AM        CONTINUE these medications which have NOT CHANGED    Details   amLODIPine (NORVASC) 5 mg tablet Take 1 tablet (5 mg total) by mouth daily, Starting Fri 9/15/2023, Normal      aspirin (Adult Aspirin Regimen) 81 mg EC tablet Take 1 tablet (81 mg total) by mouth daily, Starting Fri 9/15/2023, Normal      atorvastatin (LIPITOR) 20 mg tablet Take 1 tablet (20 mg total) by mouth daily, Starting Fri 9/15/2023, Normal      dulaglutide (Trulicity) 1.5 MG/0.5ML injection Inject 0.5 mL (1.5 mg total) under the skin every 7 days, Starting Fri 9/15/2023, Normal      fluticasone (FLONASE) 50 mcg/act nasal spray 2 sprays into each nostril daily, Starting Fri 9/15/2023, Normal      folic acid (FOLVITE) 800 MCG tablet Take 0.5 tablets (400 mcg total) by mouth daily, Starting Fri 9/15/2023, Normal      glucose blood (Accu-Chek Guide) test strip Use as instructed, Normal      metFORMIN (GLUCOPHAGE) 1000 MG tablet Take 1 tablet (1,000 mg total) by mouth 2 (two) times a day with meals, Starting Fri 9/15/2023, Normal      metoprolol succinate (TOPROL-XL) 25 mg 24 hr tablet Take 1 tablet (25 mg total) by mouth daily, Starting Fri 9/15/2023, Normal      valsartan-hydrochlorothiazide (DIOVAN-HCT) 320-25 MG per tablet Take 1 tablet by mouth daily, Starting Wed 1/31/2024, Normal             No discharge procedures on file.    PDMP Review       None            ED Provider  Electronically Signed by             Jorden Richardson DO  02/27/24 [id number]

## 2024-02-27 NOTE — DISCHARGE INSTRUCTIONS
Continue current medications as directed.  Please monitor your blood pressure as directed by cardiology.  Please return if condition worsens.

## 2024-02-28 LAB
ATRIAL RATE: 98 BPM
P AXIS: 49 DEGREES
PR INTERVAL: 164 MS
QRS AXIS: 14 DEGREES
QRSD INTERVAL: 86 MS
QT INTERVAL: 366 MS
QTC INTERVAL: 467 MS
T WAVE AXIS: 53 DEGREES
VENTRICULAR RATE: 98 BPM

## 2024-02-28 PROCEDURE — 93010 ELECTROCARDIOGRAM REPORT: CPT | Performed by: INTERNAL MEDICINE

## 2024-03-05 ENCOUNTER — OFFICE VISIT (OUTPATIENT)
Dept: ENDOCRINOLOGY | Facility: CLINIC | Age: 59
End: 2024-03-05
Payer: COMMERCIAL

## 2024-03-05 VITALS
BODY MASS INDEX: 30.24 KG/M2 | SYSTOLIC BLOOD PRESSURE: 160 MMHG | DIASTOLIC BLOOD PRESSURE: 90 MMHG | WEIGHT: 216 LBS | HEIGHT: 71 IN | HEART RATE: 100 BPM

## 2024-03-05 DIAGNOSIS — E11.9 TYPE 2 DIABETES MELLITUS WITHOUT COMPLICATION, WITHOUT LONG-TERM CURRENT USE OF INSULIN (HCC): ICD-10-CM

## 2024-03-05 DIAGNOSIS — I10 PRIMARY HYPERTENSION: ICD-10-CM

## 2024-03-05 DIAGNOSIS — E78.2 MIXED HYPERLIPIDEMIA: ICD-10-CM

## 2024-03-05 DIAGNOSIS — E11.65 TYPE 2 DIABETES MELLITUS WITH HYPERGLYCEMIA, WITHOUT LONG-TERM CURRENT USE OF INSULIN (HCC): Primary | ICD-10-CM

## 2024-03-05 PROCEDURE — 99214 OFFICE O/P EST MOD 30 MIN: CPT | Performed by: STUDENT IN AN ORGANIZED HEALTH CARE EDUCATION/TRAINING PROGRAM

## 2024-03-05 RX ORDER — DULAGLUTIDE 1.5 MG/.5ML
1.5 INJECTION, SOLUTION SUBCUTANEOUS
Qty: 6 ML | Refills: 1 | Status: SHIPPED | OUTPATIENT
Start: 2024-03-05

## 2024-03-05 NOTE — ASSESSMENT & PLAN NOTE
Worsening. Fasting hyperglycemia noted. Discussed lifestyle and pharmacologic options including intensification of trulicity vs GLP1 class substitution vs basal insulin. I would consider trialing mounjaro again as her adverse reaction was likely a mild injection site reaction and not a systemic response. She may be open to that option as well. Korin shared some hesitancy in insulin initially, but was more open minded about its use following our conversation. Her goal is to await follow up testing of A1c prior to any decisions, as she feels she can better optimize her lifestyle plan. Will follow

## 2024-03-05 NOTE — PROGRESS NOTES
Korin Wilhelm 58 y.o. female MRN: [id number]    Encounter: [id number]      Assessment/Plan     Problem List Items Addressed This Visit     Type 2 diabetes mellitus without complication, without long-term current use of insulin (HCC)     Worsening. Fasting hyperglycemia noted. Discussed lifestyle and pharmacologic options including intensification of trulicity vs GLP1 class substitution vs basal insulin. I would consider trialing mounjaro again as her adverse reaction was likely a mild injection site reaction and not a systemic response. She may be open to that option as well. Korin shared some hesitancy in insulin initially, but was more open minded about its use following our conversation. Her goal is to await follow up testing of A1c prior to any decisions, as she feels she can better optimize her lifestyle plan. Will follow         Relevant Medications    dulaglutide (Trulicity) 1.5 MG/0.5ML injection    Primary hypertension     Uncontrolled today. Will monitor and adjust therapy as appropriate.          Mixed hyperlipidemia     Continue statin.         Other Visit Diagnoses     Type 2 diabetes mellitus with hyperglycemia, without long-term current use of insulin (HCC)    -  Primary    Relevant Medications    dulaglutide (Trulicity) 1.5 MG/0.5ML injection    Other Relevant Orders    Hemoglobin A1C    Albumin / creatinine urine ratio    Lipid Panel with Direct LDL reflex    Comprehensive metabolic panel          RTC 4-mo    CC: Diabetes    History of Present Illness     HPI:    Korin returns today in follow up of type 2 diabetes. She reports recent stress regarding evaluation of breast mass contributing to inconsistency with following diabetes diet plan. That has been resolved, and Korin feels more motivated now to improve her control on account of worsening of her A1c.     She is presently taking metformin 1g bid, trulicity 1.5 mg weekly.     She is frustrated by elevated fasting blood sugars,  typically exceeding 160-180. No symptoms.     She has concerns about insulin therapy. She has prior experience with mounjaro causing rash at injection site.     For hypertension she takes valsartan-hctz 160-12.5 mg daily, amlodipine 5 mg daily. BP remains elevated. Korin mentions family member on BB with good outcome.     For hyperlipidemia she takes lipitor 20 mg daily.      Review of Systems   Constitutional:  Negative for diaphoresis and unexpected weight change.   Gastrointestinal:  Negative for nausea and vomiting.   Endocrine: Negative for polydipsia and polyuria.   All other systems reviewed and are negative.      Historical Information   Past Medical History:   Diagnosis Date   • Diabetes mellitus (HCC)    • Ear problems Year and a half   • High cholesterol    • Hypertension    • Sinusitis      Past Surgical History:   Procedure Laterality Date   • HYSTERECTOMY     • TONSILLECTOMY  5 years opd     Social History   Social History     Substance and Sexual Activity   Alcohol Use Never     Social History     Substance and Sexual Activity   Drug Use Never     Social History     Tobacco Use   Smoking Status Former   • Current packs/day: 0.00   • Average packs/day: 1.5 packs/day for 25.7 years (38.5 ttl pk-yrs)   • Types: Cigarettes   • Start date: 1984   • Quit date: 4/10/2010   • Years since quittin.9   Smokeless Tobacco Never     Family History:   Family History   Problem Relation Age of Onset   • Diabetes Mother    • Diabetes Father    • Hypertension Father        Meds/Allergies   Current Outpatient Medications   Medication Sig Dispense Refill   • amLODIPine (NORVASC) 5 mg tablet Take 1 tablet (5 mg total) by mouth daily 90 tablet 1   • aspirin (Adult Aspirin Regimen) 81 mg EC tablet Take 1 tablet (81 mg total) by mouth daily 90 tablet 1   • atorvastatin (LIPITOR) 20 mg tablet Take 1 tablet (20 mg total) by mouth daily 90 tablet 3   • dulaglutide (Trulicity) 1.5 MG/0.5ML injection Inject 0.5 mL  "(1.5 mg total) under the skin every 7 days 6 mL 1   • fluticasone (FLONASE) 50 mcg/act nasal spray 2 sprays into each nostril daily 11.1 mL 0   • folic acid (FOLVITE) 800 MCG tablet Take 0.5 tablets (400 mcg total) by mouth daily 90 tablet 1   • glucose blood (Accu-Chek Guide) test strip Use as instructed 100 strip 3   • metFORMIN (GLUCOPHAGE) 1000 MG tablet Take 1 tablet (1,000 mg total) by mouth 2 (two) times a day with meals 180 tablet 1   • metoprolol succinate (TOPROL-XL) 25 mg 24 hr tablet Take 1 tablet (25 mg total) by mouth daily 90 tablet 1   • valsartan-hydrochlorothiazide (DIOVAN-HCT) 320-25 MG per tablet Take 1 tablet by mouth daily 90 tablet 3     No current facility-administered medications for this visit.     Allergies   Allergen Reactions   • Jardiance [Empagliflozin] GI Intolerance   • Ozempic (0.25 Or 0.5 Mg-Dose) [Semaglutide(0.25 Or 0.5mg-Dos)] GI Intolerance       Objective   Vitals: Blood pressure 160/90, pulse 100, height 5' 11\" (1.803 m), weight 98 kg (216 lb).    Physical Exam  Vitals reviewed.   Constitutional:       General: She is not in acute distress.     Appearance: Normal appearance.   HENT:      Head: Normocephalic and atraumatic.      Nose: Nose normal.   Eyes:      General: No scleral icterus.     Conjunctiva/sclera: Conjunctivae normal.   Cardiovascular:      Pulses: no weak pulses.           Dorsalis pedis pulses are 2+ on the right side and 2+ on the left side.   Pulmonary:      Effort: Pulmonary effort is normal. No respiratory distress.   Musculoskeletal:         General: Normal range of motion.   Feet:      Right foot:      Skin integrity: No ulcer, skin breakdown, erythema, warmth, callus or dry skin.      Left foot:      Skin integrity: No ulcer, skin breakdown, erythema, warmth, callus or dry skin.   Skin:     General: Skin is warm and dry.   Neurological:      General: No focal deficit present.      Mental Status: She is alert.   Psychiatric:         Mood and Affect: Mood " "normal.         Behavior: Behavior normal.     Diabetic Foot Exam    Patient's shoes and socks removed.    Right Foot/Ankle   Right Foot Inspection  Skin Exam: skin normal and skin intact. No dry skin, no warmth, no callus, no erythema, no maceration, no abnormal color, no pre-ulcer, no ulcer and no callus.     Sensory   Vibration: intact  Monofilament testing: intact    Vascular  The right DP pulse is 2+.     Left Foot/Ankle  Left Foot Inspection  Skin Exam: skin normal and skin intact. No dry skin, no warmth, no erythema, no maceration, normal color, no pre-ulcer, no ulcer and no callus.     Sensory   Vibration: intact  Monofilament testing: intact    Vascular  The left DP pulse is 2+.     Assign Risk Category  No deformity present  No loss of protective sensation  No weak pulses  Risk: 0      The history was obtained from the review of the chart, patient.    Lab Results:   Lab Results   Component Value Date/Time    Hemoglobin A1C 8.5 (H) 02/14/2024 06:33 AM    Hemoglobin A1C 7.6 (H) 08/15/2023 07:03 AM    Hemoglobin A1C 7.2 (H) 05/15/2023 07:09 AM    BUN 20 02/14/2024 06:33 AM    BUN 23 08/15/2023 07:03 AM    BUN 23 05/15/2023 07:09 AM    Potassium 3.9 02/14/2024 06:33 AM    Potassium 4.0 08/15/2023 07:03 AM    Potassium 4.0 05/15/2023 07:09 AM    Chloride 102 02/14/2024 06:33 AM    Chloride 106 08/15/2023 07:03 AM    Chloride 109 (H) 05/15/2023 07:09 AM    CO2 26 02/14/2024 06:33 AM    CO2 25 08/15/2023 07:03 AM    CO2 26 05/15/2023 07:09 AM    Creatinine 0.73 02/14/2024 06:33 AM    Creatinine 0.75 08/15/2023 07:03 AM    Creatinine 0.75 05/15/2023 07:09 AM       Imaging Studies: I have personally reviewed pertinent reports.      Portions of the record may have been created with voice recognition software. Occasional wrong word or \"sound a like\" substitutions may have occurred due to the inherent limitations of voice recognition software. Read the chart carefully and recognize, using context, where substitutions " have occurred.

## 2024-03-06 DIAGNOSIS — Z00.00 HEALTHCARE MAINTENANCE: ICD-10-CM

## 2024-03-06 RX ORDER — UREA 10 %
400 LOTION (ML) TOPICAL DAILY
Qty: 90 TABLET | Refills: 1 | Status: SHIPPED | OUTPATIENT
Start: 2024-03-06

## 2024-03-26 ENCOUNTER — RA CDI HCC (OUTPATIENT)
Dept: OTHER | Facility: HOSPITAL | Age: 59
End: 2024-03-26

## 2024-04-02 ENCOUNTER — OFFICE VISIT (OUTPATIENT)
Dept: FAMILY MEDICINE CLINIC | Facility: CLINIC | Age: 59
End: 2024-04-02
Payer: COMMERCIAL

## 2024-04-02 VITALS
HEIGHT: 71 IN | BODY MASS INDEX: 30.3 KG/M2 | SYSTOLIC BLOOD PRESSURE: 134 MMHG | WEIGHT: 216.4 LBS | OXYGEN SATURATION: 99 % | TEMPERATURE: 97.7 F | HEART RATE: 99 BPM | DIASTOLIC BLOOD PRESSURE: 82 MMHG

## 2024-04-02 DIAGNOSIS — E11.9 TYPE 2 DIABETES MELLITUS WITHOUT COMPLICATION, WITHOUT LONG-TERM CURRENT USE OF INSULIN (HCC): ICD-10-CM

## 2024-04-02 DIAGNOSIS — E78.2 MIXED HYPERLIPIDEMIA: ICD-10-CM

## 2024-04-02 DIAGNOSIS — I10 PRIMARY HYPERTENSION: ICD-10-CM

## 2024-04-02 DIAGNOSIS — Z12.31 ENCOUNTER FOR SCREENING MAMMOGRAM FOR MALIGNANT NEOPLASM OF BREAST: ICD-10-CM

## 2024-04-02 DIAGNOSIS — F41.9 ANXIETY: ICD-10-CM

## 2024-04-02 DIAGNOSIS — Z00.00 WELL ADULT EXAM: Primary | ICD-10-CM

## 2024-04-02 PROBLEM — Z53.20: Status: ACTIVE | Noted: 2024-04-02

## 2024-04-02 PROCEDURE — 99396 PREV VISIT EST AGE 40-64: CPT | Performed by: PHYSICIAN ASSISTANT

## 2024-04-02 PROCEDURE — 99213 OFFICE O/P EST LOW 20 MIN: CPT | Performed by: PHYSICIAN ASSISTANT

## 2024-04-02 RX ORDER — ESCITALOPRAM OXALATE 5 MG/1
5 TABLET ORAL DAILY
Qty: 30 TABLET | Refills: 0 | Status: SHIPPED | OUTPATIENT
Start: 2024-04-02

## 2024-04-02 RX ORDER — AMLODIPINE BESYLATE 5 MG/1
5 TABLET ORAL DAILY
Qty: 90 TABLET | Refills: 1 | Status: SHIPPED | OUTPATIENT
Start: 2024-04-02

## 2024-04-02 RX ORDER — VALSARTAN AND HYDROCHLOROTHIAZIDE 320; 25 MG/1; MG/1
1 TABLET, FILM COATED ORAL DAILY
Qty: 90 TABLET | Refills: 3 | Status: SHIPPED | OUTPATIENT
Start: 2024-04-02

## 2024-04-02 RX ORDER — ATORVASTATIN CALCIUM 20 MG/1
20 TABLET, FILM COATED ORAL DAILY
Qty: 90 TABLET | Refills: 3 | Status: SHIPPED | OUTPATIENT
Start: 2024-04-02

## 2024-04-02 RX ORDER — METOPROLOL SUCCINATE 25 MG/1
25 TABLET, EXTENDED RELEASE ORAL DAILY
Qty: 90 TABLET | Refills: 1 | Status: SHIPPED | OUTPATIENT
Start: 2024-04-02

## 2024-04-02 NOTE — PROGRESS NOTES
"ADULT ANNUAL PHYSICAL  [facility]    NAME: Korin Wilhelm  AGE: 58 y.o. SEX: female  : 1965     DATE: 2024     Assessment and Plan:     Problem List Items Addressed This Visit        Endocrine    Type 2 diabetes mellitus without complication, without long-term current use of insulin (HCC)     Pt has labs ordered by endo, reports that she is to have them drawn in August. Pt unable to urinate today for urine microalbumin, she will leave sample when has labs drawn.   Pt states that she has eye exam later this month, will have them send report.  Lab Results   Component Value Date    HGBA1C 8.5 (H) 2024          Relevant Orders    Albumin / creatinine urine ratio       Behavioral Health    Anxiety     Initiate treatment with lexapro 5 mg daily, pt to RTO 1 month or sooner PRN.         Relevant Medications    escitalopram (LEXAPRO) 5 mg tablet   Other Visit Diagnoses     Well adult exam    -  Primary    Encounter for screening mammogram for malignant neoplasm of breast        Relevant Orders    Mammo screening bilateral w 3d & cad          Immunizations and preventive care screenings were discussed with patient today. Appropriate education was printed on patient's after visit summary.    Counseling:  Dental Health: discussed importance of regular tooth brushing, flossing, and dental visits.      Depression Screening and Follow-up Plan: Patient was screened for depression during today's encounter. They screened negative with a PHQ-2 score of 0.        Return in about 4 weeks (around 2024).     Chief Complaint:     Chief Complaint   Patient presents with   • Annual Exam     Doing good      History of Present Illness:     Adult Annual Physical   Patient here for a comprehensive physical exam. The patient reports problems - anxiety/feeling \"high strung.\" Would like to start daily anti-anxiety medication .    Diet and Physical Activity  Diet/Nutrition: " well balanced diet.   Exercise: no formal exercise.      Depression Screening  PHQ-2/9 Depression Screening    Little interest or pleasure in doing things: 0 - not at all  Feeling down, depressed, or hopeless: 0 - not at all  PHQ-2 Score: 0  PHQ-2 Interpretation: Negative depression screen       General Health  Sleep: sleeps well.   Hearing: decreased - right. Pt to use debrox, will flush at next OV  Vision: goes for regular eye exams.   Dental: brushes teeth once daily.          Review of Systems:     Review of Systems   Constitutional:  Negative for activity change, appetite change, chills, diaphoresis, fatigue, fever and unexpected weight change.   HENT:  Negative for congestion, ear pain, postnasal drip, rhinorrhea, sinus pressure, sinus pain, sneezing, sore throat, tinnitus and voice change.    Eyes:  Negative for pain, redness and visual disturbance.   Respiratory:  Negative for cough, chest tightness, shortness of breath and wheezing.    Cardiovascular:  Negative for chest pain, palpitations and leg swelling.   Gastrointestinal:  Negative for abdominal pain, blood in stool, constipation, diarrhea, nausea and vomiting.   Genitourinary:  Negative for difficulty urinating, dysuria, frequency, hematuria and urgency.   Musculoskeletal:  Negative for arthralgias, back pain, gait problem, joint swelling, myalgias, neck pain and neck stiffness.   Skin:  Negative for color change, pallor, rash and wound.   Neurological:  Negative for dizziness, tremors, weakness, light-headedness and headaches.   Psychiatric/Behavioral:  Negative for dysphoric mood, self-injury, sleep disturbance and suicidal ideas. The patient is nervous/anxious.       Past Medical History:     Past Medical History:   Diagnosis Date   • Diabetes mellitus (HCC)    • Ear problems Year and a half   • High cholesterol    • Hypertension    • Sinusitis       Past Surgical History:     Past Surgical History:   Procedure Laterality Date   • HYSTERECTOMY      • TONSILLECTOMY  5 years opd      Social History:     Social History     Socioeconomic History   • Marital status: /Civil Union     Spouse name: None   • Number of children: None   • Years of education: None   • Highest education level: None   Occupational History   • None   Tobacco Use   • Smoking status: Former     Current packs/day: 0.00     Average packs/day: 1.5 packs/day for 25.7 years (38.5 ttl pk-yrs)     Types: Cigarettes     Start date: 1984     Quit date: 4/10/2010     Years since quittin.9   • Smokeless tobacco: Never   Vaping Use   • Vaping status: Never Used   Substance and Sexual Activity   • Alcohol use: Never   • Drug use: Never   • Sexual activity: Yes     Partners: Male     Birth control/protection: None   Other Topics Concern   • None   Social History Narrative   • None     Social Determinants of Health     Financial Resource Strain: Low Risk  (3/14/2023)    Overall Financial Resource Strain (CARDIA)    • Difficulty of Paying Living Expenses: Not hard at all   Food Insecurity: No Food Insecurity (10/21/2022)    Hunger Vital Sign    • Worried About Running Out of Food in the Last Year: Never true    • Ran Out of Food in the Last Year: Never true   Transportation Needs: No Transportation Needs (3/14/2023)    PRAPARE - Transportation    • Lack of Transportation (Medical): No    • Lack of Transportation (Non-Medical): No   Physical Activity: Not on file   Stress: Not on file   Social Connections: Not on file   Intimate Partner Violence: Not on file   Housing Stability: Low Risk  (10/21/2022)    Housing Stability Vital Sign    • Unable to Pay for Housing in the Last Year: No    • Number of Places Lived in the Last Year: 1    • Unstable Housing in the Last Year: No      Family History:     Family History   Problem Relation Age of Onset   • Diabetes Mother    • Diabetes Father    • Hypertension Father       Current Medications:     Current Outpatient Medications   Medication Sig  "Dispense Refill   • aspirin (Adult Aspirin Regimen) 81 mg EC tablet Take 1 tablet (81 mg total) by mouth daily 90 tablet 1   • dulaglutide (Trulicity) 1.5 MG/0.5ML injection Inject 0.5 mL (1.5 mg total) under the skin every 7 days 6 mL 1   • escitalopram (LEXAPRO) 5 mg tablet Take 1 tablet (5 mg total) by mouth daily 30 tablet 0   • fluticasone (FLONASE) 50 mcg/act nasal spray 2 sprays into each nostril daily 11.1 mL 0   • glucose blood (Accu-Chek Guide) test strip Use as instructed 100 strip 3   • amLODIPine (NORVASC) 5 mg tablet Take 1 tablet (5 mg total) by mouth daily 90 tablet 1   • atorvastatin (LIPITOR) 20 mg tablet Take 1 tablet (20 mg total) by mouth daily 90 tablet 3   • folic acid (FOLVITE) 800 MCG tablet TAKE ONE-HALF TABLET BY MOUTH EVERY DAY (Patient not taking: Reported on 4/2/2024) 90 tablet 1   • metFORMIN (GLUCOPHAGE) 1000 MG tablet Take 1 tablet (1,000 mg total) by mouth 2 (two) times a day with meals 180 tablet 1   • metoprolol succinate (TOPROL-XL) 25 mg 24 hr tablet Take 1 tablet (25 mg total) by mouth daily 90 tablet 1   • valsartan-hydrochlorothiazide (DIOVAN-HCT) 320-25 MG per tablet Take 1 tablet by mouth daily 90 tablet 3     No current facility-administered medications for this visit.      Allergies:     Allergies   Allergen Reactions   • Jardiance [Empagliflozin] GI Intolerance   • Ozempic (0.25 Or 0.5 Mg-Dose) [Semaglutide(0.25 Or 0.5mg-Dos)] GI Intolerance      Physical Exam:     /82   Pulse 99   Temp 97.7 °F (36.5 °C)   Ht 5' 11\" (1.803 m)   Wt 98.2 kg (216 lb 6.4 oz)   SpO2 99%   BMI 30.18 kg/m²     Physical Exam  Vitals and nursing note reviewed.   Constitutional:       General: She is not in acute distress.     Appearance: She is well-developed.   HENT:      Head: Normocephalic and atraumatic.   Eyes:      Conjunctiva/sclera: Conjunctivae normal.   Cardiovascular:      Rate and Rhythm: Normal rate and regular rhythm.      Heart sounds: No murmur heard.  Pulmonary:      " Effort: Pulmonary effort is normal. No respiratory distress.      Breath sounds: Normal breath sounds.   Abdominal:      Palpations: Abdomen is soft.      Tenderness: There is no abdominal tenderness.   Musculoskeletal:         General: No swelling.      Cervical back: Neck supple.   Skin:     General: Skin is warm and dry.      Capillary Refill: Capillary refill takes less than 2 seconds.   Neurological:      Mental Status: She is alert.   Psychiatric:         Mood and Affect: Mood normal.          Abbie Campos PA-C  [facility]

## 2024-04-02 NOTE — ASSESSMENT & PLAN NOTE
Pt has labs ordered by endo, reports that she is to have them drawn in August. Pt unable to urinate today for urine microalbumin, she will leave sample when has labs drawn.   Pt states that she has eye exam later this month, will have them send report.  Lab Results   Component Value Date    HGBA1C 8.5 (H) 02/14/2024

## 2024-04-19 ENCOUNTER — VBI (OUTPATIENT)
Dept: ADMINISTRATIVE | Facility: OTHER | Age: 59
End: 2024-04-19

## 2024-04-21 DIAGNOSIS — Z00.00 HEALTHCARE MAINTENANCE: ICD-10-CM

## 2024-04-21 DIAGNOSIS — I10 PRIMARY HYPERTENSION: ICD-10-CM

## 2024-04-22 DIAGNOSIS — Z12.31 ENCOUNTER FOR SCREENING MAMMOGRAM FOR MALIGNANT NEOPLASM OF BREAST: ICD-10-CM

## 2024-04-22 RX ORDER — ASPIRIN 81 MG/1
81 TABLET, COATED ORAL DAILY
Qty: 90 TABLET | Refills: 1 | Status: SHIPPED | OUTPATIENT
Start: 2024-04-22

## 2024-04-22 RX ORDER — VALSARTAN AND HYDROCHLOROTHIAZIDE 160; 12.5 MG/1; MG/1
1 TABLET, FILM COATED ORAL DAILY
Qty: 90 TABLET | Refills: 1 | Status: SHIPPED | OUTPATIENT
Start: 2024-04-22

## 2024-04-27 DIAGNOSIS — F41.9 ANXIETY: ICD-10-CM

## 2024-04-28 RX ORDER — ESCITALOPRAM OXALATE 5 MG/1
5 TABLET ORAL DAILY
Qty: 30 TABLET | Refills: 5 | Status: SHIPPED | OUTPATIENT
Start: 2024-04-28

## 2024-05-28 ENCOUNTER — TELEPHONE (OUTPATIENT)
Age: 59
End: 2024-05-28

## 2024-05-28 ENCOUNTER — OFFICE VISIT (OUTPATIENT)
Dept: URGENT CARE | Facility: CLINIC | Age: 59
End: 2024-05-28
Payer: COMMERCIAL

## 2024-05-28 VITALS — HEART RATE: 120 BPM | TEMPERATURE: 98.7 F | RESPIRATION RATE: 18 BRPM | OXYGEN SATURATION: 97 %

## 2024-05-28 DIAGNOSIS — U07.1 COVID-19: Primary | ICD-10-CM

## 2024-05-28 PROCEDURE — 99213 OFFICE O/P EST LOW 20 MIN: CPT | Performed by: PHYSICIAN ASSISTANT

## 2024-05-28 RX ORDER — NIRMATRELVIR AND RITONAVIR 300-100 MG
3 KIT ORAL 2 TIMES DAILY
Qty: 30 TABLET | Refills: 0 | Status: SHIPPED | OUTPATIENT
Start: 2024-05-28 | End: 2024-06-02

## 2024-05-28 NOTE — PROGRESS NOTES
[facility]        NAME: Korin Wilhelm is a 58 y.o. female  : 1965    MRN: [id number]  DATE: May 28, 2024  TIME: 9:19 PM    Assessment and Plan   COVID-19 [U07.1]  1. COVID-19  nirmatrelvir & ritonavir (Paxlovid, 300/100,) tablet therapy pack            Patient Instructions   There are no Patient Instructions on file for this visit.      Follow up with PCP in 3-5 days.  Proceed to  ER if symptoms worsen.    Chief Complaint     Chief Complaint   Patient presents with    Fatigue     Had positive at home covid test today seeking paxlovid          History of Present Illness       The patient presents the clinic for fatigue, cough, and runny nose for the past 2 days.  She did recently return from a cruise.  She states that she took 2 home COVID test which were both positive.  She is requesting to be treated for Paxlovid given her history of hypertension and type 2 diabetes.  She currently is on Diovan and a statin for cholesterol.  She does not have any other medications which would be contraindications to taking Paxlovid.  Currently denies chest pain or significant shortness of breath.        Review of Systems   Review of Systems   Constitutional:  Positive for chills. Negative for fever.   HENT:  Positive for congestion. Negative for ear pain and sore throat.    Eyes:  Negative for pain and visual disturbance.   Respiratory:  Positive for cough. Negative for shortness of breath, wheezing and stridor.    Cardiovascular:  Negative for chest pain and palpitations.   Gastrointestinal:  Negative for abdominal pain and vomiting.   Genitourinary:  Negative for dysuria and hematuria.   Musculoskeletal:  Negative for arthralgias and back pain.   Skin:  Negative for color change and rash.   Neurological:  Positive for headaches. Negative for seizures and syncope.   All other systems reviewed and are negative.        Current Medications       Current Outpatient Medications:     nirmatrelvir & ritonavir  (Paxlovid, 300/100,) tablet therapy pack, Take 3 tablets by mouth 2 (two) times a day for 5 days Take 2 nirmatrelvir tablets + 1 ritonavir tablet together per dose, Disp: 30 tablet, Rfl: 0    amLODIPine (NORVASC) 5 mg tablet, Take 1 tablet (5 mg total) by mouth daily, Disp: 90 tablet, Rfl: 1    Aspirin Low Dose 81 MG EC tablet, TAKE ONE TABLET BY MOUTH EVERY DAY, Disp: 90 tablet, Rfl: 1    dulaglutide (Trulicity) 1.5 MG/0.5ML injection, Inject 0.5 mL (1.5 mg total) under the skin every 7 days, Disp: 6 mL, Rfl: 1    escitalopram (LEXAPRO) 5 mg tablet, TAKE ONE TABLET BY MOUTH EVERY DAY, Disp: 30 tablet, Rfl: 5    fluticasone (FLONASE) 50 mcg/act nasal spray, 2 sprays into each nostril daily, Disp: 11.1 mL, Rfl: 0    glucose blood (Accu-Chek Guide) test strip, Use as instructed, Disp: 100 strip, Rfl: 3    metFORMIN (GLUCOPHAGE) 1000 MG tablet, Take 1 tablet (1,000 mg total) by mouth 2 (two) times a day with meals, Disp: 180 tablet, Rfl: 1    metoprolol succinate (TOPROL-XL) 25 mg 24 hr tablet, Take 1 tablet (25 mg total) by mouth daily, Disp: 90 tablet, Rfl: 1    Current Allergies     Allergies as of 05/28/2024 - Reviewed 05/28/2024   Allergen Reaction Noted    Jardiance [empagliflozin] GI Intolerance 10/25/2022    Ozempic (0.25 or 0.5 mg-dose) [semaglutide(0.25 or 0.5mg-dos)] GI Intolerance 09/16/2022            The following portions of the patient's history were reviewed and updated as appropriate: allergies, current medications, past family history, past medical history, past social history, past surgical history and problem list.     Past Medical History:   Diagnosis Date    Diabetes mellitus (HCC)     Ear problems Year and a half    High cholesterol     Hypertension     Sinusitis        Past Surgical History:   Procedure Laterality Date    HYSTERECTOMY      TONSILLECTOMY  5 years opd       Family History   Problem Relation Age of Onset    Diabetes Mother     Diabetes Father     Hypertension Father           Medications have been verified.        Objective   Pulse (!) 120   Temp 98.7 °F (37.1 °C)   Resp 18   SpO2 97%        Physical Exam     Physical Exam  Constitutional:       Appearance: She is well-developed. She is not diaphoretic.   HENT:      Head: Normocephalic.      Nose: Congestion and rhinorrhea present.   Eyes:      General:         Right eye: No discharge.         Left eye: No discharge.      Pupils: Pupils are equal, round, and reactive to light.   Neck:      Thyroid: No thyromegaly.   Cardiovascular:      Rate and Rhythm: Normal rate.      Heart sounds: No murmur heard.  Pulmonary:      Effort: Pulmonary effort is normal. No respiratory distress.      Breath sounds: Rhonchi present. No wheezing or rales.   Chest:      Chest wall: No tenderness.   Abdominal:      General: There is no distension.      Palpations: Abdomen is soft.      Tenderness: There is no abdominal tenderness. There is no guarding or rebound.   Musculoskeletal:         General: Normal range of motion.      Cervical back: Normal range of motion.   Lymphadenopathy:      Cervical: No cervical adenopathy.   Skin:     General: Skin is warm.   Neurological:      Mental Status: She is alert and oriented to person, place, and time.               -She was instructed to hold her valsartan and statin while on the Paxlovid.  Patient was instructed to closely follow-up with her PCP or go to the ER if symptoms worsen.

## 2024-05-28 NOTE — TELEPHONE ENCOUNTER
Was on a cruise ship came Friday having symptoms stuffing nose cough tired no fever no body aches took a covid test was positive can the doctor send medication to [facility] in [facility] the office call when done [phone]

## 2024-06-18 ENCOUNTER — HOSPITAL ENCOUNTER (EMERGENCY)
Facility: HOSPITAL | Age: 59
Discharge: HOME | End: 2024-06-18
Attending: EMERGENCY MEDICINE
Payer: COMMERCIAL

## 2024-06-18 ENCOUNTER — APPOINTMENT (EMERGENCY)
Dept: CT IMAGING | Facility: HOSPITAL | Age: 59
End: 2024-06-18
Payer: COMMERCIAL

## 2024-06-18 VITALS
DIASTOLIC BLOOD PRESSURE: 67 MMHG | HEART RATE: 92 BPM | RESPIRATION RATE: 16 BRPM | TEMPERATURE: 97.3 F | SYSTOLIC BLOOD PRESSURE: 133 MMHG | OXYGEN SATURATION: 98 %

## 2024-06-18 DIAGNOSIS — E83.42 HYPOMAGNESEMIA: ICD-10-CM

## 2024-06-18 DIAGNOSIS — R10.9 ABDOMINAL PAIN: ICD-10-CM

## 2024-06-18 DIAGNOSIS — R19.7 DIARRHEA: Primary | ICD-10-CM

## 2024-06-18 LAB
ALBUMIN SERPL BCP-MCNC: 4.6 G/DL (ref 3.5–5)
ALP SERPL-CCNC: 54 U/L (ref 34–104)
ALT SERPL W P-5'-P-CCNC: 26 U/L (ref 7–52)
ANION GAP SERPL CALCULATED.3IONS-SCNC: 14 MMOL/L (ref 4–13)
AST SERPL W P-5'-P-CCNC: 17 U/L (ref 13–39)
BASOPHILS # BLD AUTO: 0.05 THOUSANDS/ÂΜL (ref 0–0.1)
BASOPHILS NFR BLD AUTO: 0 % (ref 0–1)
BILIRUB SERPL-MCNC: 0.57 MG/DL (ref 0.2–1)
BILIRUB UR QL STRIP: NEGATIVE
BUN SERPL-MCNC: 10 MG/DL (ref 5–25)
CALCIUM SERPL-MCNC: 9.8 MG/DL (ref 8.4–10.2)
CARDIAC TROPONIN I PNL SERPL HS: 3 NG/L (ref 8–18)
CHLORIDE SERPL-SCNC: 100 MMOL/L (ref 96–108)
CLARITY UR: CLEAR
CO2 SERPL-SCNC: 21 MMOL/L (ref 21–32)
COLOR UR: COLORLESS
CREAT SERPL-MCNC: 0.69 MG/DL (ref 0.6–1.3)
EOSINOPHIL # BLD AUTO: 0.02 THOUSAND/ÂΜL (ref 0–0.61)
EOSINOPHIL NFR BLD AUTO: 0 % (ref 0–6)
ERYTHROCYTE [DISTWIDTH] IN BLOOD BY AUTOMATED COUNT: 11.9 % (ref 11.6–15.1)
GFR SERPL CREATININE-BSD FRML MDRD: 95 ML/MIN/1.73SQ M
GLUCOSE SERPL-MCNC: 215 MG/DL (ref 65–140)
GLUCOSE UR STRIP-MCNC: ABNORMAL MG/DL
HCT VFR BLD AUTO: 41.6 % (ref 34.8–46.1)
HGB BLD-MCNC: 14.2 G/DL (ref 11.5–15.4)
HGB UR QL STRIP.AUTO: NEGATIVE
IMM GRANULOCYTES # BLD AUTO: 0.04 THOUSAND/UL (ref 0–0.2)
IMM GRANULOCYTES NFR BLD AUTO: 0 % (ref 0–2)
KETONES UR STRIP-MCNC: NEGATIVE MG/DL
LEUKOCYTE ESTERASE UR QL STRIP: NEGATIVE
LIPASE SERPL-CCNC: 41 U/L (ref 11–82)
LYMPHOCYTES # BLD AUTO: 2.62 THOUSANDS/ÂΜL (ref 0.6–4.47)
LYMPHOCYTES NFR BLD AUTO: 22 % (ref 14–44)
MAGNESIUM SERPL-MCNC: 1.4 MG/DL (ref 1.9–2.7)
MCH RBC QN AUTO: 32.1 PG (ref 26.8–34.3)
MCHC RBC AUTO-ENTMCNC: 34.1 G/DL (ref 31.4–37.4)
MCV RBC AUTO: 94 FL (ref 82–98)
MONOCYTES # BLD AUTO: 0.79 THOUSAND/ÂΜL (ref 0.17–1.22)
MONOCYTES NFR BLD AUTO: 7 % (ref 4–12)
NEUTROPHILS # BLD AUTO: 8.25 THOUSANDS/ÂΜL (ref 1.85–7.62)
NEUTS SEG NFR BLD AUTO: 71 % (ref 43–75)
NITRITE UR QL STRIP: NEGATIVE
NRBC BLD AUTO-RTO: 0 /100 WBCS
PH UR STRIP.AUTO: 5.5 [PH]
PLATELET # BLD AUTO: 321 THOUSANDS/UL (ref 149–390)
PMV BLD AUTO: 10.6 FL (ref 8.9–12.7)
POTASSIUM SERPL-SCNC: 3.7 MMOL/L (ref 3.5–5.3)
PROT SERPL-MCNC: 7 G/DL (ref 6.4–8.4)
PROT UR STRIP-MCNC: NEGATIVE MG/DL
RBC # BLD AUTO: 4.42 MILLION/UL (ref 3.81–5.12)
SODIUM SERPL-SCNC: 135 MMOL/L (ref 135–147)
SP GR UR STRIP.AUTO: <1.005 (ref 1–1.03)
UROBILINOGEN UR STRIP-ACNC: <2 MG/DL
WBC # BLD AUTO: 11.77 THOUSAND/UL (ref 4.31–10.16)

## 2024-06-18 PROCEDURE — 96361 HYDRATE IV INFUSION ADD-ON: CPT

## 2024-06-18 PROCEDURE — 99284 EMERGENCY DEPT VISIT MOD MDM: CPT

## 2024-06-18 PROCEDURE — 83735 ASSAY OF MAGNESIUM: CPT | Performed by: PHYSICIAN ASSISTANT

## 2024-06-18 PROCEDURE — 93005 ELECTROCARDIOGRAM TRACING: CPT

## 2024-06-18 PROCEDURE — 85025 COMPLETE CBC W/AUTO DIFF WBC: CPT | Performed by: PHYSICIAN ASSISTANT

## 2024-06-18 PROCEDURE — 96374 THER/PROPH/DIAG INJ IV PUSH: CPT

## 2024-06-18 PROCEDURE — 84484 ASSAY OF TROPONIN QUANT: CPT | Performed by: PHYSICIAN ASSISTANT

## 2024-06-18 PROCEDURE — 36415 COLL VENOUS BLD VENIPUNCTURE: CPT | Performed by: PHYSICIAN ASSISTANT

## 2024-06-18 PROCEDURE — 99285 EMERGENCY DEPT VISIT HI MDM: CPT | Performed by: PHYSICIAN ASSISTANT

## 2024-06-18 PROCEDURE — 80053 COMPREHEN METABOLIC PANEL: CPT | Performed by: PHYSICIAN ASSISTANT

## 2024-06-18 PROCEDURE — 74177 CT ABD & PELVIS W/CONTRAST: CPT

## 2024-06-18 PROCEDURE — 83690 ASSAY OF LIPASE: CPT | Performed by: PHYSICIAN ASSISTANT

## 2024-06-18 RX ORDER — MAGNESIUM SULFATE HEPTAHYDRATE 40 MG/ML
2 INJECTION, SOLUTION INTRAVENOUS ONCE
Status: COMPLETED | OUTPATIENT
Start: 2024-06-18 | End: 2024-06-18

## 2024-06-18 RX ORDER — DICYCLOMINE HCL 20 MG
20 TABLET ORAL 2 TIMES DAILY
Qty: 20 TABLET | Refills: 0 | Status: SHIPPED | OUTPATIENT
Start: 2024-06-18

## 2024-06-18 RX ADMIN — IOHEXOL 100 ML: 350 INJECTION, SOLUTION INTRAVENOUS at 11:47

## 2024-06-18 RX ADMIN — SODIUM CHLORIDE 1000 ML: 0.9 INJECTION, SOLUTION INTRAVENOUS at 10:46

## 2024-06-18 RX ADMIN — MAGNESIUM SULFATE HEPTAHYDRATE 2 G: 40 INJECTION, SOLUTION INTRAVENOUS at 11:32

## 2024-06-18 NOTE — ED PROVIDER NOTES
History  Chief Complaint   Patient presents with    Diarrhea     Patient presents with right upper abdominal pain with diarrhea and nausea for two weeks. Denies fevers/vomiting. Recent COVID positive 24     This is a 58-year-old female presenting to the emergency department today for evaluation of abdominal pain.  Patient states that she contracted COVID about 3 weeks ago from a cruise ship vacation.  She did take Paxlovid, since the last day of her Paxlovid therapy she began with diarrhea.  She states it is watery brown.  She has had diarrhea now for 2 weeks.  Denies any black or red contents.  She also admits to right upper quadrant abdominal pain radiates to the epigastrium over the same timeframe.  Otherwise she feels well no chest pain shortness of breath she is active well-appearing here at bedside she is mildly hypertensive that she relates to feeling nervous about today's visit.        Prior to Admission Medications   Prescriptions Last Dose Informant Patient Reported? Taking?   Aspirin Low Dose 81 MG EC tablet 2024  No Yes   Sig: TAKE ONE TABLET BY MOUTH EVERY DAY   amLODIPine (NORVASC) 5 mg tablet 2024  No Yes   Sig: Take 1 tablet (5 mg total) by mouth daily   dulaglutide (Trulicity) 1.5 MG/0.5ML injection Past Week  No Yes   Sig: Inject 0.5 mL (1.5 mg total) under the skin every 7 days   escitalopram (LEXAPRO) 5 mg tablet 2024  No Yes   Sig: TAKE ONE TABLET BY MOUTH EVERY DAY   fluticasone (FLONASE) 50 mcg/act nasal spray   No No   Si sprays into each nostril daily   glucose blood (Accu-Chek Guide) test strip   No No   Sig: Use as instructed   metFORMIN (GLUCOPHAGE) 1000 MG tablet 2024  No Yes   Sig: Take 1 tablet (1,000 mg total) by mouth 2 (two) times a day with meals   metoprolol succinate (TOPROL-XL) 25 mg 24 hr tablet 2024  No Yes   Sig: Take 1 tablet (25 mg total) by mouth daily      Facility-Administered Medications: None       Past Medical History:   Diagnosis  Date    Diabetes mellitus (HCC)     Ear problems Year and a half    High cholesterol     Hypertension     Sinusitis        Past Surgical History:   Procedure Laterality Date    HYSTERECTOMY      TONSILLECTOMY  5 years opd       Family History   Problem Relation Age of Onset    Diabetes Mother     Diabetes Father     Hypertension Father      I have reviewed and agree with the history as documented.    E-Cigarette/Vaping    E-Cigarette Use Never User      E-Cigarette/Vaping Substances     Social History     Tobacco Use    Smoking status: Former     Current packs/day: 0.00     Average packs/day: 1.5 packs/day for 25.7 years (38.5 ttl pk-yrs)     Types: Cigarettes     Start date: 1984     Quit date: 4/10/2010     Years since quittin.2    Smokeless tobacco: Never   Vaping Use    Vaping status: Never Used   Substance Use Topics    Alcohol use: Never    Drug use: Never       Review of Systems   Constitutional:  Negative for chills and fever.   HENT:  Negative for ear pain and sore throat.    Eyes:  Negative for pain and visual disturbance.   Respiratory:  Negative for cough and shortness of breath.    Cardiovascular:  Negative for chest pain and palpitations.   Gastrointestinal:  Positive for abdominal pain and diarrhea. Negative for vomiting.   Genitourinary:  Negative for dysuria and hematuria.   Musculoskeletal:  Negative for arthralgias and back pain.   Skin:  Negative for color change and rash.   Neurological:  Negative for seizures and syncope.   All other systems reviewed and are negative.      Physical Exam  Physical Exam  Constitutional:       Appearance: She is well-developed. She is not ill-appearing.   HENT:      Right Ear: External ear normal. No swelling. Tympanic membrane is not bulging.      Left Ear: External ear normal. No swelling. Tympanic membrane is not bulging.      Nose: Nose normal.      Mouth/Throat:      Pharynx: No oropharyngeal exudate.   Eyes:      General: Lids are normal.       Conjunctiva/sclera: Conjunctivae normal.      Pupils: Pupils are equal, round, and reactive to light.   Neck:      Thyroid: No thyromegaly.      Vascular: No JVD.      Trachea: No tracheal deviation.   Cardiovascular:      Rate and Rhythm: Normal rate and regular rhythm.      Pulses: Normal pulses.      Heart sounds: Normal heart sounds. No murmur heard.     No friction rub. No gallop.   Pulmonary:      Effort: Pulmonary effort is normal. No respiratory distress.      Breath sounds: Normal breath sounds. No stridor. No wheezing or rales.   Chest:      Chest wall: No tenderness.   Abdominal:      General: Bowel sounds are normal. There is no distension.      Palpations: Abdomen is soft. There is no mass.      Tenderness: There is no abdominal tenderness. There is no guarding or rebound.      Hernia: No hernia is present.   Musculoskeletal:         General: Normal range of motion.      Cervical back: Normal range of motion and neck supple. No edema. Normal range of motion.   Lymphadenopathy:      Cervical: No cervical adenopathy.   Skin:     General: Skin is warm and dry.      Coloration: Skin is not pale.      Findings: No erythema or rash.   Neurological:      Mental Status: She is alert and oriented to person, place, and time.      GCS: GCS eye subscore is 4. GCS verbal subscore is 5. GCS motor subscore is 6.      Cranial Nerves: No cranial nerve deficit.      Sensory: No sensory deficit.      Deep Tendon Reflexes: Reflexes are normal and symmetric.   Psychiatric:         Speech: Speech normal.         Behavior: Behavior normal.         Vital Signs  ED Triage Vitals [06/18/24 1019]   Temperature Pulse Respirations Blood Pressure SpO2   (!) 97.3 °F (36.3 °C) 103 16 (!) 194/91 98 %      Temp Source Heart Rate Source Patient Position - Orthostatic VS BP Location FiO2 (%)   Temporal Monitor Sitting Right arm --      Pain Score       5           Vitals:    06/18/24 1019 06/18/24 1130   BP: (!) 194/91 133/67   Pulse: 103  92   Patient Position - Orthostatic VS: Sitting Sitting         Visual Acuity      ED Medications  Medications   sodium chloride 0.9 % bolus 1,000 mL (1,000 mL Intravenous New Bag 6/18/24 1046)   magnesium sulfate 2 g/50 mL IVPB (premix) 2 g (0 g Intravenous Stopped 6/18/24 1135)   iohexol (OMNIPAQUE) 350 MG/ML injection (MULTI-DOSE) 100 mL (100 mL Intravenous Given 6/18/24 1147)       Diagnostic Studies  Results Reviewed       Procedure Component Value Units Date/Time    UA (URINE) with reflex to Scope [[id number]]  (Abnormal) Collected: 06/18/24 1151    Lab Status: Final result Specimen: Urine, Clean Catch Updated: 06/18/24 1159     Color, UA Colorless     Clarity, UA Clear     Specific Gravity, UA <1.005     pH, UA 5.5     Leukocytes, UA Negative     Nitrite, UA Negative     Protein, UA Negative mg/dl      Glucose, UA 30 (3/100%) mg/dl      Ketones, UA Negative mg/dl      Urobilinogen, UA <2.0 mg/dl      Bilirubin, UA Negative     Occult Blood, UA Negative    High Sensitivity Troponin I Random [[id number]]  (Abnormal) Collected: 06/18/24 1045    Lab Status: Final result Specimen: Blood from Arm, Left Updated: 06/18/24 1120     HS TnI random 3 ng/L     Lipase [[id number]]  (Normal) Collected: 06/18/24 1045    Lab Status: Final result Specimen: Blood from Arm, Left Updated: 06/18/24 1116     Lipase 41 u/L     Comprehensive metabolic panel [[id number]]  (Abnormal) Collected: 06/18/24 1045    Lab Status: Final result Specimen: Blood from Arm, Left Updated: 06/18/24 1116     Sodium 135 mmol/L      Potassium 3.7 mmol/L      Chloride 100 mmol/L      CO2 21 mmol/L      ANION GAP 14 mmol/L      BUN 10 mg/dL      Creatinine 0.69 mg/dL      Glucose 215 mg/dL      Calcium 9.8 mg/dL      AST 17 U/L      ALT 26 U/L      Alkaline Phosphatase 54 U/L      Total Protein 7.0 g/dL      Albumin 4.6 g/dL      Total Bilirubin 0.57 mg/dL      eGFR 95 ml/min/1.73sq m     Narrative:      National Kidney Disease Foundation guidelines for  Chronic Kidney Disease (CKD):     Stage 1 with normal or high GFR (GFR > 90 mL/min/1.73 square meters)    Stage 2 Mild CKD (GFR = 60-89 mL/min/1.73 square meters)    Stage 3A Moderate CKD (GFR = 45-59 mL/min/1.73 square meters)    Stage 3B Moderate CKD (GFR = 30-44 mL/min/1.73 square meters)    Stage 4 Severe CKD (GFR = 15-29 mL/min/1.73 square meters)    Stage 5 End Stage CKD (GFR <15 mL/min/1.73 square meters)  Note: GFR calculation is accurate only with a steady state creatinine    Magnesium [[id number]]  (Abnormal) Collected: 06/18/24 1045    Lab Status: Final result Specimen: Blood from Arm, Left Updated: 06/18/24 1116     Magnesium 1.4 mg/dL     CBC and differential [[id number]]  (Abnormal) Collected: 06/18/24 1045    Lab Status: Final result Specimen: Blood from Arm, Left Updated: 06/18/24 1054     WBC 11.77 Thousand/uL      RBC 4.42 Million/uL      Hemoglobin 14.2 g/dL      Hematocrit 41.6 %      MCV 94 fL      MCH 32.1 pg      MCHC 34.1 g/dL      RDW 11.9 %      MPV 10.6 fL      Platelets 321 Thousands/uL      nRBC 0 /100 WBCs      Segmented % 71 %      Immature Grans % 0 %      Lymphocytes % 22 %      Monocytes % 7 %      Eosinophils Relative 0 %      Basophils Relative 0 %      Absolute Neutrophils 8.25 Thousands/µL      Absolute Immature Grans 0.04 Thousand/uL      Absolute Lymphocytes 2.62 Thousands/µL      Absolute Monocytes 0.79 Thousand/µL      Eosinophils Absolute 0.02 Thousand/µL      Basophils Absolute 0.05 Thousands/µL                    CT abdomen pelvis with contrast   Final Result by Evert Barbour MD (06/18 1228)      No acute findings in the abdomen or pelvis.      Colonic diverticulosis      Hepatomegaly with steatosis.         Workstation performed: [id number]                    Procedures  ECG 12 Lead Documentation Only    Date/Time: 6/18/2024 10:57 AM    Performed by: Cory Walsh PA-C  Authorized by: Cory Walsh PA-C    Indications / Diagnosis:  Gastric pain  ECG  reviewed by me, the ED Provider: yes    Patient location:  ED  Interpretation:     Interpretation: normal    Rate:     ECG rate:  97    ECG rate assessment: normal    Rhythm:     Rhythm: sinus rhythm    Ectopy:     Ectopy: none    QRS:     QRS axis:  Normal    QRS intervals:  Normal  Conduction:     Conduction: normal    ST segments:     ST segments:  Normal  T waves:     T waves: normal             ED Course  ED Course as of 06/18/24 1239   Tue Jun 18, 2024   1104 WBC(!): 11.77   1104 Hemoglobin: 14.2   1104 Platelet Count: 321  CBC reviewed there is mild leukocytosis but no thrombocytopenia or anemia   1237 IMPRESSION:     No acute findings in the abdomen or pelvis.     Colonic diverticulosis     Hepatomegaly with steatosis.               HEART Risk Score      Flowsheet Row Most Recent Value   Heart Score Risk Calculator    History 0 Filed at: 06/18/2024 1057   ECG 0 Filed at: 06/18/2024 1057   Age 1 Filed at: 06/18/2024 1057   Risk Factors 1 Filed at: 06/18/2024 1057   Troponin 0 Filed at: 06/18/2024 1057   HEART Score 2 Filed at: 06/18/2024 1057                          SBIRT 22yo+      Flowsheet Row Most Recent Value   Initial Alcohol Screen: US AUDIT-C     1. How often do you have a drink containing alcohol? 0 Filed at: 06/18/2024 1019   2. How many drinks containing alcohol do you have on a typical day you are drinking?  0 Filed at: 06/18/2024 1019   3a. Male UNDER 65: How often do you have five or more drinks on one occasion? 0 Filed at: 06/18/2024 1019   3b. FEMALE Any Age, or MALE 65+: How often do you have 4 or more drinks on one occassion? 0 Filed at: 06/18/2024 1019   Audit-C Score 0 Filed at: 06/18/2024 1019   HUMAIRA: How many times in the past year have you...    Used an illegal drug or used a prescription medication for non-medical reasons? Never Filed at: 06/18/2024 1019                      Medical Decision Making  58-year-old female history of hepatomegaly presents for right upper quadrant pain  diarrhea after completing Paxil bid from a COVID diagnosis 2 to 3 weeks ago.  Well-appearing here mildly hypertensive differential diagnosis includes pancreatitis, hepatitis, cholangitis, cholecystitis, appendicitis, bowel obstruction, enterocolitis evaluation is complete CT scan of the abdomen and pelvis is with out acute abnormality she has known.    Fatty liver disease.  No acute cholecystitis pancreatitis noted troponin normal she has a moderately low magnesium level which was repleted via IV therapy.  She is overall well-appearing vital signs reviewed within normal limit she is stable for discharge home we will add Bentyl therapy for the diarrhea.    Amount and/or Complexity of Data Reviewed  Labs: ordered. Decision-making details documented in ED Course.  Radiology: ordered.    Risk  Prescription drug management.             Disposition  Final diagnoses:   Diarrhea   Hypomagnesemia   Abdominal pain     Time reflects when diagnosis was documented in both MDM as applicable and the Disposition within this note       Time User Action Codes Description Comment    6/18/2024 11:26 AM Cory Walsh Add [R19.7] Diarrhea     6/18/2024 11:26 AM Cory Walsh Add [E83.42] Hypomagnesemia     6/18/2024 12:38 PM Cory Walsh Add [R10.9] Abdominal pain           ED Disposition       ED Disposition   Discharge    Condition   Stable    Date/Time   Tue Jun 18, 2024 1238    Comment   Korin Wilhelm discharge to home/self care.                   Follow-up Information       Follow up With Specialties Details Why Contact Info    Abbie Campos PA-C Physician Assistant Schedule an appointment as soon as possible for a visit  As needed [address] [facility] [address]  [phone]              Patient's Medications   Discharge Prescriptions    DICYCLOMINE (BENTYL) 20 MG TABLET    Take 1 tablet (20 mg total) by mouth 2 (two) times a day       Start Date: 6/18/2024 End Date: --       Order Dose: 20 mg       Quantity:  20 tablet    Refills: 0       No discharge procedures on file.    PDMP Review       None            ED Provider  Electronically Signed by             Cory Walsh PA-C  06/18/24 7863

## 2024-06-24 LAB
ATRIAL RATE: 97 BPM
P AXIS: 61 DEGREES
PR INTERVAL: 158 MS
QRS AXIS: 32 DEGREES
QRSD INTERVAL: 82 MS
QT INTERVAL: 370 MS
QTC INTERVAL: 469 MS
T WAVE AXIS: 50 DEGREES
VENTRICULAR RATE: 97 BPM

## 2024-06-24 PROCEDURE — 93010 ELECTROCARDIOGRAM REPORT: CPT | Performed by: INTERNAL MEDICINE

## 2024-08-01 ENCOUNTER — OFFICE VISIT (OUTPATIENT)
Dept: OTOLARYNGOLOGY | Facility: CLINIC | Age: 59
End: 2024-08-01

## 2024-08-01 VITALS
DIASTOLIC BLOOD PRESSURE: 90 MMHG | OXYGEN SATURATION: 99 % | WEIGHT: 216 LBS | RESPIRATION RATE: 16 BRPM | TEMPERATURE: 98.1 F | BODY MASS INDEX: 30.24 KG/M2 | HEIGHT: 71 IN | SYSTOLIC BLOOD PRESSURE: 142 MMHG | HEART RATE: 57 BPM

## 2024-08-01 DIAGNOSIS — Z97.4 WEARS HEARING AID IN BOTH EARS: ICD-10-CM

## 2024-08-01 DIAGNOSIS — J31.0 NONALLERGIC RHINITIS: ICD-10-CM

## 2024-08-01 DIAGNOSIS — R09.82 PND (POST-NASAL DRIP): ICD-10-CM

## 2024-08-01 DIAGNOSIS — K21.9 GASTROESOPHAGEAL REFLUX DISEASE WITHOUT ESOPHAGITIS: ICD-10-CM

## 2024-08-01 DIAGNOSIS — H90.3 SENSORINEURAL HEARING LOSS (SNHL) OF BOTH EARS: Primary | ICD-10-CM

## 2024-08-01 DIAGNOSIS — J30.9 ALLERGIC RHINITIS, UNSPECIFIED SEASONALITY, UNSPECIFIED TRIGGER: ICD-10-CM

## 2024-08-01 RX ORDER — FOLIC ACID 0.8 MG
400 TABLET ORAL DAILY
COMMUNITY
Start: 2024-06-07

## 2024-08-01 RX ORDER — VALSARTAN AND HYDROCHLOROTHIAZIDE 160; 12.5 MG/1; MG/1
1 TABLET, FILM COATED ORAL DAILY
COMMUNITY
Start: 2024-07-23

## 2024-08-01 RX ORDER — FLUTICASONE PROPIONATE 50 MCG
2 SPRAY, SUSPENSION (ML) NASAL DAILY
Qty: 16 G | Refills: 11 | Status: SHIPPED | OUTPATIENT
Start: 2024-08-01

## 2024-08-01 RX ORDER — IPRATROPIUM BROMIDE 21 UG/1
2 SPRAY, METERED NASAL 3 TIMES DAILY
Qty: 30 ML | Refills: 3 | Status: SHIPPED | OUTPATIENT
Start: 2024-08-01

## 2024-08-01 RX ORDER — FAMOTIDINE 40 MG/1
40 TABLET, FILM COATED ORAL 2 TIMES DAILY
Qty: 30 TABLET | Refills: 4 | Status: SHIPPED | OUTPATIENT
Start: 2024-08-01

## 2024-08-01 NOTE — PROGRESS NOTES
[facility] Otolaryngology New Patient visit      Korin Wilhelm is a 58 y.o. 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, started having some facial pressure  Rhinorrhea   Chronic PND   Congestion     Took a Zyrtec   Has Flonase but hasn't been using it     History of reflux  Occasional heartburn  Belching   No longer on meds    No dysphagia   No pain   No otalgia  No otorrhea  No hearing changes  No tinnitus     Review of any relevant imaging: images from any scan reviewed personally  Scans:   Labs:   Notes:     Review of Systems:  As above    PMHx:  Past Medical History:   Diagnosis Date    Diabetes mellitus (HCC)     Ear problems Year and a half    High cholesterol     Hypertension     Sinusitis         FAMHx:  Family History   Problem Relation Age of Onset    Diabetes Mother     Diabetes Father     Hypertension Father        SOCHx:  Social History     Socioeconomic History    Marital status: /Civil Union     Spouse name: Not on file    Number of children: Not on file    Years of education: Not on file    Highest education level: Not on file   Occupational History    Not on file   Tobacco Use    Smoking status: Former     Current packs/day: 0.00     Average packs/day: 1.5 packs/day for 25.7 years (38.5 ttl pk-yrs)     Types: Cigarettes     Start date: 1984     Quit date: 4/10/2010     Years since quittin.3    Smokeless tobacco: Never   Vaping Use    Vaping status: Never Used   Substance and Sexual Activity    Alcohol use: Never    Drug use: Never    Sexual activity: Yes     Partners: Male     Birth control/protection: None   Other Topics Concern  "   Not on file   Social History Narrative    Not on file     Social Determinants of Health     Financial Resource Strain: Low Risk  (3/14/2023)    Overall Financial Resource Strain (CARDIA)     Difficulty of Paying Living Expenses: Not hard at all   Food Insecurity: No Food Insecurity (10/21/2022)    Hunger Vital Sign     Worried About Running Out of Food in the Last Year: Never true     Ran Out of Food in the Last Year: Never true   Transportation Needs: No Transportation Needs (3/14/2023)    PRAPARE - Transportation     Lack of Transportation (Medical): No     Lack of Transportation (Non-Medical): No   Physical Activity: Not on file   Stress: Not on file   Social Connections: Not on file   Intimate Partner Violence: Not on file   Housing Stability: Low Risk  (10/21/2022)    Housing Stability Vital Sign     Unable to Pay for Housing in the Last Year: No     Number of Places Lived in the Last Year: 1     Unstable Housing in the Last Year: No       Allergies:  Allergies   Allergen Reactions    Jardiance [Empagliflozin] GI Intolerance    Ozempic (0.25 Or 0.5 Mg-Dose) [Semaglutide(0.25 Or 0.5mg-Dos)] GI Intolerance        MEDS:  Reviewed      Physical exam: (abnormal findings appear in bold and supercede any conflicting normal findings listed below)    /90 (BP Location: Left arm, Patient Position: Sitting, Cuff Size: Large)   Pulse 57   Temp 98.1 °F (36.7 °C) (Temporal)   Resp 16   Ht 5' 11\" (1.803 m)   Wt 98 kg (216 lb)   SpO2 99%   BMI 30.13 kg/m²     Constitutional:  Well developed, well nourished and groomed, in no acute distress.     Eyes:  Extra-ocular movements intact, pupils equally round and reactive to light and accommodation, the lids and conjunctivae are normal in appearance.    Head: Atraumatic, normocephalic, no visible scalp lesions, bony palpation unremarkable without stepoffs, parotid and submandibular salivary glands non-tender to palpation and without masses bilaterally.     Ears:  " Auricles normal in appearance bilaterally, mastoid prominence non-tender, external auditory canals clear bilaterally, tympanic membranes intact bilaterally without evidence of middle ear effusion or masses, normal appearing ossicles. Binaural BTE hearing aids. No wax impaction.    Nose/Sinuses:  External appearance unremarkable, no maxillary or frontal sinus tenderness to palpation bilaterally. Anterior rhinoscopy demonstrates pink mucosa. No polyps or other masses identified. Turbinates are non-edematous. No evidence of purulent drainage. DNS-Right. Wet mucosa    Oral Cavity:  Moist mucus membranes, gums and dentition unremarkable, no oral mucosal masses or lesions, floor of mouth soft, tongue mobile without masses or lesions.     Oropharynx:  Base of tongue soft and without masses, tonsils bilaterally unremarkable, soft palate mucosa unremarkable.     Neck:  No visible or palpable cervical lesions or lymphadenopathy, thyroid gland is normal in size and symmetry and without masses, normal laryngeal elevation with swallowing.     Cardiovascular:  Normal rate and rhythm, no palpable thrills, no jugulovenous distension observed.  Respiratory:  Normal respiratory effort without evidence of retractions or use of accessory muscles.  Integument:  Normal appearing without observed masses or lesions.  Neurologic:  Cranial nerves II-XII intact bilaterally.  Psychiatric:  Alert and oriented to time, place and person, normal affect.      Procedure:   Laryngoscopy          Performed by Bernarda Vera PA-C      Authorized by Bernarda Vera PA-C        Consent: Verbal consent obtained.  Consent given by: patient  Patient understanding: patient states understanding of the procedure being performed    Due to the patient's complaints related to PND. I recommended flexible laryngoscopy to completely evaluate the airway due to inability to be visualized with a mirror.  Patient was in agreement and gave verbal consent        Local  anesthesia used: yes      Anesthesia    Local anesthesia used: yes  Local Anesthetic: topical anesthetic      Sedation    Patient sedated: no           Culture: no   Procedure Details    Procedure Notes:  Nasopharynx unremarkable, with normal eustachian tube orifices and normal Fossa of Rosenmuller bilaterally. Posterior nasopharyngeal and oropharyngeal walls normal. Oropharyngeal mucosa moist, no masses or lesions. Tongue base normal without masses or lesions. Vallecula and pyriform sinuses clear, without pooling of secretions. Epiglottis, aryepiglottic folds and remainder of supraglottis well-appearing. True vocal folds mobile, without masses or lesions, normal glottic chink.     Other findings: moderate clear secretions. No mucopus or polyps. small right BOT cyst.    Patient tolerance: Patient tolerated the procedure well with no immediate complications             Audiometry:  Defers repeat audiometry.     Audiogram last visit reviewed. Mild sloping to moderately severe high frequency SNHL. Word recognition left, 92% at 80 dB, right 100 % at 70 dB. Tympanograms type A bilaterally.        Assessment:  1. Sensorineural hearing loss (SNHL) of both ears        2. Wears hearing aid in both ears        3. PND (post-nasal drip)  famotidine (PEPCID) 40 MG tablet      4. Nonallergic rhinitis  ipratropium (ATROVENT) 0.03 % nasal spray      5. Gastroesophageal reflux disease without esophagitis  famotidine (PEPCID) 40 MG tablet      6. Allergic rhinitis, unspecified seasonality, unspecified trigger  fluticasone (FLONASE) 50 mcg/act nasal spray            Plan:  1. Korin Wilhelm is a 58 y.o. female with acute and chronic problems as above who presents for re-evaluation of multiple concerns    Sensorineural hearing loss (SNHL) of both ears   Hearing is stable. She has a h/o  Mild sloping to moderately severe high frequency SNHL. She reports having a repeat hearing test recently at [facility] and it was stable. Defers  "repeat audiometry today. Patient was encouraged to continue wearing hearing amplification and undergo regular hearing aid maintenance visits to maintain optimal ear health.    PND (post-nasal drip)   Discussed differential diagnosis that commonly causes sensation of PND which may be multi-factorial including age-related change, chronic rhinitis, allergic rhinitis, sinusitis, vs. reflux. We discussed options for reflux treatment vs targeting the nose. Discussed observation, Flonase, saline irrigation, XHANCE, repeat allergy testing, PPI, or H2 blockers.     Nonallergic rhinitis   Discussed KANDI and treatment options. She has moderate rhinorrhea and clear secretions on scope. Agrees to Atrovent 0.03% trial. May use no more than TID.  Also discussed saline irrigation to wash away dust, dirt, and debris from the nose after environmental allergen exposure.     Gastroesophageal reflux disease without esophagitis   H/o GERD. May also be contributing to the PND. Was on medications in the past. Will have her start Pepcid BID.  Right base of tongue cyst on FFL. Yellow in color. Reassured of benign nature. No intervention required.    Allergic rhinitis  Continue Flonase and Zyrtec.         F/u 6 months       ** Please Note: Portions of the record may have been created with voice recognition software. Occasional wrong word or \"sound a like\" substitutions may have occurred due to the inherent limitations of voice recognition software. There may also be notations and random deletions of words or characters from malfunctioning software. Read the chart carefully and recognize, using context, where substitutions/deletions have occurred.**    "

## 2024-09-02 ENCOUNTER — APPOINTMENT (EMERGENCY)
Dept: RADIOLOGY | Facility: HOSPITAL | Age: 59
End: 2024-09-02
Payer: COMMERCIAL

## 2024-09-02 ENCOUNTER — HOSPITAL ENCOUNTER (EMERGENCY)
Facility: HOSPITAL | Age: 59
Discharge: HOME | End: 2024-09-02
Attending: EMERGENCY MEDICINE | Admitting: EMERGENCY MEDICINE
Payer: COMMERCIAL

## 2024-09-02 ENCOUNTER — APPOINTMENT (EMERGENCY)
Dept: CT IMAGING | Facility: HOSPITAL | Age: 59
End: 2024-09-02
Payer: COMMERCIAL

## 2024-09-02 VITALS
WEIGHT: 217.59 LBS | HEART RATE: 102 BPM | TEMPERATURE: 98.3 F | DIASTOLIC BLOOD PRESSURE: 76 MMHG | RESPIRATION RATE: 20 BRPM | SYSTOLIC BLOOD PRESSURE: 142 MMHG | OXYGEN SATURATION: 97 % | BODY MASS INDEX: 30.35 KG/M2

## 2024-09-02 DIAGNOSIS — R07.9 CHEST PAIN NOT DUE TO ACUTE CORONARY SYNDROME: Primary | ICD-10-CM

## 2024-09-02 DIAGNOSIS — K21.9 GERD (GASTROESOPHAGEAL REFLUX DISEASE): ICD-10-CM

## 2024-09-02 LAB
2HR DELTA HS TROPONIN: 0 NG/L
ALBUMIN SERPL BCG-MCNC: 4.9 G/DL (ref 3.5–5)
ALP SERPL-CCNC: 59 U/L (ref 34–104)
ALT SERPL W P-5'-P-CCNC: 29 U/L (ref 7–52)
ANION GAP SERPL CALCULATED.3IONS-SCNC: 13 MMOL/L (ref 4–13)
APTT PPP: 24 SECONDS (ref 23–34)
AST SERPL W P-5'-P-CCNC: 18 U/L (ref 13–39)
BASOPHILS # BLD AUTO: 0.05 THOUSANDS/ÂΜL (ref 0–0.1)
BASOPHILS NFR BLD AUTO: 1 % (ref 0–1)
BILIRUB DIRECT SERPL-MCNC: 0.18 MG/DL (ref 0–0.2)
BILIRUB SERPL-MCNC: 0.72 MG/DL (ref 0.2–1)
BUN SERPL-MCNC: 11 MG/DL (ref 5–25)
CALCIUM SERPL-MCNC: 10.1 MG/DL (ref 8.4–10.2)
CARDIAC TROPONIN I PNL SERPL HS: 4 NG/L
CARDIAC TROPONIN I PNL SERPL HS: 4 NG/L
CHLORIDE SERPL-SCNC: 99 MMOL/L (ref 96–108)
CO2 SERPL-SCNC: 24 MMOL/L (ref 21–32)
CREAT SERPL-MCNC: 0.77 MG/DL (ref 0.6–1.3)
EOSINOPHIL # BLD AUTO: 0.06 THOUSAND/ÂΜL (ref 0–0.61)
EOSINOPHIL NFR BLD AUTO: 1 % (ref 0–6)
ERYTHROCYTE [DISTWIDTH] IN BLOOD BY AUTOMATED COUNT: 12.1 % (ref 11.6–15.1)
FLUAV RNA RESP QL NAA+PROBE: NEGATIVE
FLUBV RNA RESP QL NAA+PROBE: NEGATIVE
GFR SERPL CREATININE-BSD FRML MDRD: 84 ML/MIN/1.73SQ M
GLUCOSE SERPL-MCNC: 284 MG/DL (ref 65–140)
HCT VFR BLD AUTO: 44.7 % (ref 34.8–46.1)
HGB BLD-MCNC: 15 G/DL (ref 11.5–15.4)
IMM GRANULOCYTES # BLD AUTO: 0.01 THOUSAND/UL (ref 0–0.2)
IMM GRANULOCYTES NFR BLD AUTO: 0 % (ref 0–2)
INR PPP: 0.92 (ref 0.85–1.19)
LACTATE SERPL-SCNC: 1.9 MMOL/L (ref 0.5–2)
LACTATE SERPL-SCNC: 2.4 MMOL/L (ref 0.5–2)
LIPASE SERPL-CCNC: 38 U/L (ref 11–82)
LYMPHOCYTES # BLD AUTO: 2.7 THOUSANDS/ÂΜL (ref 0.6–4.47)
LYMPHOCYTES NFR BLD AUTO: 30 % (ref 14–44)
MAGNESIUM SERPL-MCNC: 1.9 MG/DL (ref 1.9–2.7)
MCH RBC QN AUTO: 31.4 PG (ref 26.8–34.3)
MCHC RBC AUTO-ENTMCNC: 33.6 G/DL (ref 31.4–37.4)
MCV RBC AUTO: 94 FL (ref 82–98)
MONOCYTES # BLD AUTO: 0.95 THOUSAND/ÂΜL (ref 0.17–1.22)
MONOCYTES NFR BLD AUTO: 10 % (ref 4–12)
NEUTROPHILS # BLD AUTO: 5.39 THOUSANDS/ÂΜL (ref 1.85–7.62)
NEUTS SEG NFR BLD AUTO: 58 % (ref 43–75)
NRBC BLD AUTO-RTO: 0 /100 WBCS
PLATELET # BLD AUTO: 306 THOUSANDS/UL (ref 149–390)
PMV BLD AUTO: 10.5 FL (ref 8.9–12.7)
POTASSIUM SERPL-SCNC: 3.5 MMOL/L (ref 3.5–5.3)
PROT SERPL-MCNC: 7.5 G/DL (ref 6.4–8.4)
PROTHROMBIN TIME: 12.9 SECONDS (ref 12.3–15)
RBC # BLD AUTO: 4.77 MILLION/UL (ref 3.81–5.12)
RSV RNA RESP QL NAA+PROBE: NEGATIVE
SARS-COV-2 RNA RESP QL NAA+PROBE: NEGATIVE
SODIUM SERPL-SCNC: 136 MMOL/L (ref 135–147)
WBC # BLD AUTO: 9.16 THOUSAND/UL (ref 4.31–10.16)

## 2024-09-02 PROCEDURE — 85610 PROTHROMBIN TIME: CPT | Performed by: EMERGENCY MEDICINE

## 2024-09-02 PROCEDURE — 85025 COMPLETE CBC W/AUTO DIFF WBC: CPT | Performed by: EMERGENCY MEDICINE

## 2024-09-02 PROCEDURE — 80076 HEPATIC FUNCTION PANEL: CPT | Performed by: EMERGENCY MEDICINE

## 2024-09-02 PROCEDURE — 71045 X-RAY EXAM CHEST 1 VIEW: CPT

## 2024-09-02 PROCEDURE — 74177 CT ABD & PELVIS W/CONTRAST: CPT

## 2024-09-02 PROCEDURE — 80048 BASIC METABOLIC PNL TOTAL CA: CPT | Performed by: EMERGENCY MEDICINE

## 2024-09-02 PROCEDURE — 84484 ASSAY OF TROPONIN QUANT: CPT | Performed by: EMERGENCY MEDICINE

## 2024-09-02 PROCEDURE — 99285 EMERGENCY DEPT VISIT HI MDM: CPT

## 2024-09-02 PROCEDURE — 0241U HB NFCT DS VIR RESP RNA 4 TRGT: CPT | Performed by: EMERGENCY MEDICINE

## 2024-09-02 PROCEDURE — 83735 ASSAY OF MAGNESIUM: CPT | Performed by: EMERGENCY MEDICINE

## 2024-09-02 PROCEDURE — 83605 ASSAY OF LACTIC ACID: CPT | Performed by: EMERGENCY MEDICINE

## 2024-09-02 PROCEDURE — 93005 ELECTROCARDIOGRAM TRACING: CPT

## 2024-09-02 PROCEDURE — 36415 COLL VENOUS BLD VENIPUNCTURE: CPT | Performed by: EMERGENCY MEDICINE

## 2024-09-02 PROCEDURE — 85730 THROMBOPLASTIN TIME PARTIAL: CPT | Performed by: EMERGENCY MEDICINE

## 2024-09-02 PROCEDURE — 83690 ASSAY OF LIPASE: CPT | Performed by: EMERGENCY MEDICINE

## 2024-09-02 PROCEDURE — 96374 THER/PROPH/DIAG INJ IV PUSH: CPT

## 2024-09-02 PROCEDURE — 99285 EMERGENCY DEPT VISIT HI MDM: CPT | Performed by: EMERGENCY MEDICINE

## 2024-09-02 RX ORDER — ONDANSETRON 2 MG/ML
4 INJECTION INTRAMUSCULAR; INTRAVENOUS ONCE
Status: COMPLETED | OUTPATIENT
Start: 2024-09-02 | End: 2024-09-02

## 2024-09-02 RX ORDER — MAGNESIUM HYDROXIDE/ALUMINUM HYDROXICE/SIMETHICONE 120; 1200; 1200 MG/30ML; MG/30ML; MG/30ML
30 SUSPENSION ORAL ONCE
Status: COMPLETED | OUTPATIENT
Start: 2024-09-02 | End: 2024-09-02

## 2024-09-02 RX ADMIN — IOHEXOL 100 ML: 350 INJECTION, SOLUTION INTRAVENOUS at 16:05

## 2024-09-02 RX ADMIN — ONDANSETRON 4 MG: 2 INJECTION INTRAMUSCULAR; INTRAVENOUS at 15:13

## 2024-09-02 RX ADMIN — ALUMINUM HYDROXIDE, MAGNESIUM HYDROXIDE, AND SIMETHICONE 30 ML: 1200; 120; 1200 SUSPENSION ORAL at 15:13

## 2024-09-02 NOTE — ED PROVIDER NOTES
History  Chief Complaint   Patient presents with    Abdominal Pain     Pt states that she has been having increased upper abdominal pain. Pt complains of vomiting, nausea and vomiting. Denies any fevers. Pt thinks it is just acid reflux but wants to get checked out.        Abdominal Pain  Associated symptoms: diarrhea and vomiting    Associated symptoms: no chest pain, no chills, no cough, no dysuria, no fever, no hematuria, no shortness of breath and no sore throat      This is a 59-year-old female past medical history includes HTN, HLD, T2DM, hepatomegaly, hepatic steatosis, history of COVID infection in June, with history of GERD started on Pepcid twice daily at follow-up visit 8/1/2024 with ENT for unrelated concerns who presents to the ER for evaluation of upper abdominal pain nausea and vomiting.    Patient reports that she has bloating gas belching and heartburn chronically.  She reports in the last week or so she has been having intermittent right upper quadrant abdominal pain and epigastric pain.  She reports the symptoms do not always follow meals and can be random.  She denies a history of cholecystectomy or known gallbladder problems but questions whether this could be contributing to symptoms.  She also reports that today she ate a banana and thereafter had acute onset of nausea and vomiting of food contents and also vomiting of bilious emesis.  She reports her symptoms resolved after vomiting.  She denies any significant pain at this time.  She reports it was the worst episode of heartburn that she had ever felt.  She did not lose consciousness nor was she diaphoretic at the time.  Pain did not radiate elsewhere.  Denies any pain now.  Wanted to get evaluated given the severity of the episode to make sure there was nothing wrong with her gallbladder or other causes.  Denies a history of angina or previous MI.  Reports she recently started magnesium supplementation and questions whether that could be  disagreeing with her stomach leading to these worsened symptoms lately.  Prior to Admission Medications   Prescriptions Last Dose Informant Patient Reported? Taking?   Aspirin Low Dose 81 MG EC tablet  Self No No   Sig: TAKE ONE TABLET BY MOUTH EVERY DAY   amLODIPine (NORVASC) 5 mg tablet  Self No No   Sig: Take 1 tablet (5 mg total) by mouth daily   dicyclomine (BENTYL) 20 mg tablet  Self No No   Sig: Take 1 tablet (20 mg total) by mouth 2 (two) times a day   dulaglutide (Trulicity) 1.5 MG/0.5ML injection  Self No No   Sig: Inject 0.5 mL (1.5 mg total) under the skin every 7 days   escitalopram (LEXAPRO) 5 mg tablet  Self No No   Sig: TAKE ONE TABLET BY MOUTH EVERY DAY   famotidine (PEPCID) 40 MG tablet   No No   Sig: Take 1 tablet (40 mg total) by mouth 2 (two) times a day   fluticasone (FLONASE) 50 mcg/act nasal spray   No No   Si sprays into each nostril daily   folic acid (FOLVITE) 800 MCG tablet   Yes No   Sig: Take 400 mcg by mouth daily   glucose blood (Accu-Chek Guide) test strip  Self No No   Sig: Use as instructed   ipratropium (ATROVENT) 0.03 % nasal spray   No No   Si sprays into each nostril 3 (three) times a day   metFORMIN (GLUCOPHAGE) 1000 MG tablet  Self No No   Sig: Take 1 tablet (1,000 mg total) by mouth 2 (two) times a day with meals   metoprolol succinate (TOPROL-XL) 25 mg 24 hr tablet  Self No No   Sig: Take 1 tablet (25 mg total) by mouth daily   valsartan-hydrochlorothiazide (DIOVAN-HCT) 160-12.5 MG per tablet   Yes No   Sig: Take 1 tablet by mouth daily      Facility-Administered Medications: None       Past Medical History:   Diagnosis Date    Diabetes mellitus (HCC)     Ear problems Year and a half    High cholesterol     Hypertension     Sinusitis        Past Surgical History:   Procedure Laterality Date    HYSTERECTOMY      TONSILLECTOMY  5 years opd       Family History   Problem Relation Age of Onset    Diabetes Mother     Diabetes Father     Hypertension Father      I have  reviewed and agree with the history as documented.    E-Cigarette/Vaping    E-Cigarette Use Never User      E-Cigarette/Vaping Substances     Social History     Tobacco Use    Smoking status: Former     Current packs/day: 0.00     Average packs/day: 1.5 packs/day for 25.7 years (38.5 ttl pk-yrs)     Types: Cigarettes     Start date: 1984     Quit date: 4/10/2010     Years since quittin.4    Smokeless tobacco: Never   Vaping Use    Vaping status: Never Used   Substance Use Topics    Alcohol use: Never    Drug use: Never       Review of Systems   Constitutional:  Negative for chills and fever.   HENT:  Negative for ear pain and sore throat.    Eyes:  Negative for pain and visual disturbance.   Respiratory:  Negative for cough and shortness of breath.    Cardiovascular:  Negative for chest pain and palpitations.   Gastrointestinal:  Positive for abdominal pain, diarrhea and vomiting.   Genitourinary:  Negative for dysuria and hematuria.   Musculoskeletal:  Negative for arthralgias and back pain.   Skin:  Negative for color change and rash.   Neurological:  Negative for seizures and syncope.   All other systems reviewed and are negative.      Physical Exam  Physical Exam  Vitals and nursing note reviewed. Exam conducted with a chaperone present.   Constitutional:       General: She is not in acute distress.     Appearance: She is well-developed.   HENT:      Head: Normocephalic and atraumatic.   Eyes:      Conjunctiva/sclera: Conjunctivae normal.   Cardiovascular:      Rate and Rhythm: Normal rate and regular rhythm.      Heart sounds: No murmur heard.  Pulmonary:      Effort: Pulmonary effort is normal. No respiratory distress.      Breath sounds: Normal breath sounds.   Abdominal:      General: There is no distension.      Palpations: Abdomen is soft.      Tenderness: There is no abdominal tenderness. There is no right CVA tenderness, left CVA tenderness, guarding or rebound. Negative signs include Paredes's  sign and McBurney's sign.   Musculoskeletal:         General: No swelling.      Cervical back: Neck supple.   Skin:     General: Skin is warm and dry.      Capillary Refill: Capillary refill takes less than 2 seconds.   Neurological:      Mental Status: She is alert.   Psychiatric:         Mood and Affect: Mood normal.         Vital Signs  ED Triage Vitals [09/02/24 1450]   Temperature Pulse Respirations Blood Pressure SpO2   98.3 °F (36.8 °C) 102 18 (!) 200/104 97 %      Temp Source Heart Rate Source Patient Position - Orthostatic VS BP Location FiO2 (%)   Temporal Monitor Lying Right arm --      Pain Score       5           Vitals:    09/02/24 1500 09/02/24 1530 09/02/24 1630 09/02/24 1700   BP: 168/79 144/77 159/81 134/81   Pulse: 98 103     Patient Position - Orthostatic VS:             Visual Acuity      ED Medications  Medications   ondansetron (ZOFRAN) injection 4 mg (4 mg Intravenous Given 9/2/24 1513)   aluminum-magnesium hydroxide-simethicone (MAALOX) oral suspension 30 mL (30 mL Oral Given 9/2/24 1513)   iohexol (OMNIPAQUE) 350 MG/ML injection (MULTI-DOSE) 100 mL (100 mL Intravenous Given 9/2/24 1605)       Diagnostic Studies  Results Reviewed       Procedure Component Value Units Date/Time    Lactic acid 2 Hours [[id number]]  (Normal) Collected: 09/02/24 1711    Lab Status: Final result Specimen: Blood from Arm, Left Updated: 09/02/24 1800     LACTIC ACID 1.9 mmol/L     Narrative:      Result may be elevated if tourniquet was used during collection.    HS Troponin I 2hr [[id number]]  (Normal) Collected: 09/02/24 1711    Lab Status: Final result Specimen: Blood from Arm, Left Updated: 09/02/24 1800     hs TnI 2hr 4 ng/L      Delta 2hr hsTnI 0 ng/L     FLU/RSV/COVID - if FLU/RSV clinically relevant [[id number]]  (Normal) Collected: 09/02/24 1513    Lab Status: Final result Specimen: Nares from Nose Updated: 09/02/24 1607     SARS-CoV-2 Negative     INFLUENZA A PCR Negative     INFLUENZA B PCR Negative     RSV  PCR Negative    Narrative:      This test has been performed using the CoV-2/Flu/RSV plus assay on the [employer] Gene[employer]pert platform. This test has been validated by the  and verified by the performing laboratory.     This test is designed to amplify and detect the following: nucleocapsid (N), envelope (E), and RNA-dependent RNA polymerase (RdRP) genes of the SARS-CoV-2 genome; matrix (M), basic polymerase (PB2), and acidic protein (PA) segments of the influenza A genome; matrix (M) and non-structural protein (NS) segments of the influenza B genome, and the nucleocapsid genes of RSV A and RSV B.     Positive results are indicative of the presence of Flu A, Flu B, RSV, and/or SARS-CoV-2 RNA. Positive results for SARS-CoV-2 or suspected novel influenza should be reported to state, local, or federal health departments according to local reporting requirements.      All results should be assessed in conjunction with clinical presentation and other laboratory markers for clinical management.     FOR PEDIATRIC PATIENTS - copy/paste COVID Guidelines URL to browser: [email or web address]       HS Troponin I 4hr [[id number]]     Lab Status: No result Specimen: Blood     HS Troponin 0hr (reflex protocol) [[id number]]  (Normal) Collected: 09/02/24 1513    Lab Status: Final result Specimen: Blood from Arm, Left Updated: 09/02/24 1550     hs TnI 0hr 4 ng/L     Lactic acid, plasma (w/reflex if result > 2.0) [[id number]]  (Abnormal) Collected: 09/02/24 1513    Lab Status: Final result Specimen: Blood from Arm, Left Updated: 09/02/24 1544     LACTIC ACID 2.4 mmol/L     Narrative:      Result may be elevated if tourniquet was used during collection.    Basic metabolic panel [[id number]]  (Abnormal) Collected: 09/02/24 1513    Lab Status: Final result Specimen: Blood from Arm, Left Updated: 09/02/24 1543     Sodium 136 mmol/L      Potassium 3.5 mmol/L      Chloride 99 mmol/L       CO2 24 mmol/L      ANION GAP 13 mmol/L      BUN 11 mg/dL      Creatinine 0.77 mg/dL      Glucose 284 mg/dL      Calcium 10.1 mg/dL      eGFR 84 ml/min/1.73sq m     Narrative:      National Kidney Disease Foundation guidelines for Chronic Kidney Disease (CKD):     Stage 1 with normal or high GFR (GFR > 90 mL/min/1.73 square meters)    Stage 2 Mild CKD (GFR = 60-89 mL/min/1.73 square meters)    Stage 3A Moderate CKD (GFR = 45-59 mL/min/1.73 square meters)    Stage 3B Moderate CKD (GFR = 30-44 mL/min/1.73 square meters)    Stage 4 Severe CKD (GFR = 15-29 mL/min/1.73 square meters)    Stage 5 End Stage CKD (GFR <15 mL/min/1.73 square meters)  Note: GFR calculation is accurate only with a steady state creatinine    Hepatic function panel [[id number]]  (Normal) Collected: 09/02/24 1513    Lab Status: Final result Specimen: Blood from Arm, Left Updated: 09/02/24 1543     Total Bilirubin 0.72 mg/dL      Bilirubin, Direct 0.18 mg/dL      Alkaline Phosphatase 59 U/L      AST 18 U/L      ALT 29 U/L      Total Protein 7.5 g/dL      Albumin 4.9 g/dL     Lipase [[id number]]  (Normal) Collected: 09/02/24 1513    Lab Status: Final result Specimen: Blood from Arm, Left Updated: 09/02/24 1543     Lipase 38 u/L     Magnesium [[id number]]  (Normal) Collected: 09/02/24 1513    Lab Status: Final result Specimen: Blood from Arm, Left Updated: 09/02/24 1543     Magnesium 1.9 mg/dL     Protime-INR [[id number]]  (Normal) Collected: 09/02/24 1513    Lab Status: Final result Specimen: Blood from Arm, Left Updated: 09/02/24 1538     Protime 12.9 seconds      INR 0.92    Narrative:      INR Therapeutic Range    Indication                                             INR Range      Atrial Fibrillation                                               2.0-3.0  Hypercoagulable State                                    2.0.2.3  Left Ventricular Asist Device                            2.0-3.0  Mechanical Heart Valve                                  -     Aortic(with afib, MI, embolism, HF, LA enlargement,    and/or coagulopathy)                                     2.0-3.0 (2.5-3.5)     Mitral                                                             2.5-3.5  Prosthetic/Bioprosthetic Heart Valve               2.0-3.0  Venous thromboembolism (VTE: VT, PE        2.0-3.0    APTT [[id number]]  (Normal) Collected: 09/02/24 1513    Lab Status: Final result Specimen: Blood from Arm, Left Updated: 09/02/24 1538     PTT 24 seconds     CBC and differential [[id number]] Collected: 09/02/24 1513    Lab Status: Final result Specimen: Blood from Arm, Left Updated: 09/02/24 1527     WBC 9.16 Thousand/uL      RBC 4.77 Million/uL      Hemoglobin 15.0 g/dL      Hematocrit 44.7 %      MCV 94 fL      MCH 31.4 pg      MCHC 33.6 g/dL      RDW 12.1 %      MPV 10.5 fL      Platelets 306 Thousands/uL      nRBC 0 /100 WBCs      Segmented % 58 %      Immature Grans % 0 %      Lymphocytes % 30 %      Monocytes % 10 %      Eosinophils Relative 1 %      Basophils Relative 1 %      Absolute Neutrophils 5.39 Thousands/µL      Absolute Immature Grans 0.01 Thousand/uL      Absolute Lymphocytes 2.70 Thousands/µL      Absolute Monocytes 0.95 Thousand/µL      Eosinophils Absolute 0.06 Thousand/µL      Basophils Absolute 0.05 Thousands/µL                    CT abdomen pelvis with contrast   ED Interpretation by Ankush Schafer DO (09/02 1638)   CT abdomen/pelvis with contrast interpreted by myself pending official radiology report.  No acute intra-abdominal process identified by myself.  No pneumoperitoneum.  Unremarkable gallbladder.  No bowel obstruction.      Final Result by Dar Zaman MD (09/02 1833)      No acute findings in the abdomen or pelvis.      Scattered colonic diverticulosis with no inflammatory changes present to suggest acute diverticulitis.      No evidence of large or small bowel obstruction.      Hepatic steatosis and hepatomegaly.         Workstation performed:  [id number]         XR chest 1 view portable    (Results Pending)              Procedures  Procedures         ED Course  ED Course as of 09/02/24 1848   Mon Sep 02, 2024   1517 EKG interpreted by myself.  EKG dated 9/2/2024 1514 demonstrates sinus tachycardia 106 bpm, normal CA, QRS, QTc durations, no STEMI.               HEART Risk Score      Flowsheet Row Most Recent Value   Heart Score Risk Calculator    History 0 Filed at: 09/02/2024 1846   ECG 0 Filed at: 09/02/2024 1846   Age 1 Filed at: 09/02/2024 1846   Risk Factors 0 Filed at: 09/02/2024 1846   Troponin 0 Filed at: 09/02/2024 1846   HEART Score 1 Filed at: 09/02/2024 1846                          SBIRT 20yo+      Flowsheet Row Most Recent Value   Initial Alcohol Screen: US AUDIT-C     1. How often do you have a drink containing alcohol? 0 Filed at: 09/02/2024 1451   2. How many drinks containing alcohol do you have on a typical day you are drinking?  0 Filed at: 09/02/2024 1451   3a. Male UNDER 65: How often do you have five or more drinks on one occasion? 0 Filed at: 09/02/2024 1451   3b. FEMALE Any Age, or MALE 65+: How often do you have 4 or more drinks on one occassion? 0 Filed at: 09/02/2024 1451   Audit-C Score 0 Filed at: 09/02/2024 1451   HUMAIRA: How many times in the past year have you...    Used an illegal drug or used a prescription medication for non-medical reasons? Never Filed at: 09/02/2024 1451                      Medical Decision Making  This is a 59-year-old female who presents for an episode of chest pain.  She denies pain at this time.  She thinks it was heartburn but describes as the worst heartburn she is ever had.  She also notes some recurrent/intermittent right upper quadrant abdominal pain.  On arrival here she is well-appearing she does not have exertional component to her symptoms did not always follow meals.  Differential diagnosis includes ACS, GERD, gastritis, esophagitis, acute cholecystitis, choledocholithiasis,  pancreatitis.    Workup including labs EKG CT abdomen pelvis cardiac monitoring unremarkable.  Treated with Mylanta and Zofran no further return of symptoms.  Will proceed with outpatient follow-up/management continue antacid.    Problems Addressed:  Chest pain not due to acute coronary syndrome: acute illness or injury  GERD (gastroesophageal reflux disease): acute illness or injury    Amount and/or Complexity of Data Reviewed  Labs: ordered.  Radiology: ordered and independent interpretation performed.    Risk  OTC drugs.  Prescription drug management.                 Disposition  Final diagnoses:   Chest pain not due to acute coronary syndrome   GERD (gastroesophageal reflux disease)     Time reflects when diagnosis was documented in both MDM as applicable and the Disposition within this note       Time User Action Codes Description Comment    9/2/2024  6:45 PM Ankush Schafer [R07.9] Chest pain not due to acute coronary syndrome     9/2/2024  6:45 PM Ankush Shcafer [K21.9] GERD (gastroesophageal reflux disease)           ED Disposition       ED Disposition   Discharge    Condition   Stable    Date/Time   Mon Sep 2, 2024 1844    Comment   Korin Wilhelm discharge to home/self care.                   Follow-up Information       Follow up With Specialties Details Why Contact Info Additional Information    Abbie Campos PA-C Physician Assistant Schedule an appointment as soon as possible for a visit   [address] [facility] [address]  [phone]       [facility] Emergency Department Emergency Medicine Go to  If symptoms worsen [address] [facility] [address]  [phone] [facility] Emergency Department, [address], [place], [address]            Patient's Medications   Discharge Prescriptions    No medications on file       No discharge procedures on file.    PDMP Review       None            ED Provider  Electronically Signed  by             Ankush Schafer,   09/02/24 1848

## 2024-09-02 NOTE — DISCHARGE INSTRUCTIONS
Thank you for visiting the Emergency Department today.    No acute findings on workup.  No evidence of acute gallbladder problems.  Normal heart enzymes and EKG no signs of heart damage.  Normal rhythm on cardiac monitor.  Suspect that this may have been a severe case of gastritis or heartburn.  Recommend continuing your antacid medication follow-up with your primary care provider as needed return here for severe recurrent or otherwise concerning symptoms.

## 2024-09-03 LAB
ATRIAL RATE: 106 BPM
P AXIS: 56 DEGREES
PR INTERVAL: 172 MS
QRS AXIS: 2 DEGREES
QRSD INTERVAL: 76 MS
QT INTERVAL: 356 MS
QTC INTERVAL: 472 MS
T WAVE AXIS: 40 DEGREES
VENTRICULAR RATE: 106 BPM

## 2024-09-03 PROCEDURE — 93010 ELECTROCARDIOGRAM REPORT: CPT | Performed by: INTERNAL MEDICINE

## 2024-09-29 ENCOUNTER — APPOINTMENT (EMERGENCY)
Dept: CT IMAGING | Facility: HOSPITAL | Age: 59
DRG: 720 | End: 2024-09-29
Payer: COMMERCIAL

## 2024-09-29 ENCOUNTER — HOSPITAL ENCOUNTER (INPATIENT)
Facility: HOSPITAL | Age: 59
LOS: 3 days | Discharge: HOME | DRG: 720 | End: 2024-10-02
Attending: EMERGENCY MEDICINE | Admitting: INTERNAL MEDICINE
Payer: COMMERCIAL

## 2024-09-29 DIAGNOSIS — B96.20 BACTEREMIA DUE TO ESCHERICHIA COLI: ICD-10-CM

## 2024-09-29 DIAGNOSIS — A41.9 SEPSIS SECONDARY TO UTI  (HCC): ICD-10-CM

## 2024-09-29 DIAGNOSIS — E11.9 TYPE 2 DIABETES MELLITUS WITHOUT COMPLICATION, WITHOUT LONG-TERM CURRENT USE OF INSULIN (HCC): ICD-10-CM

## 2024-09-29 DIAGNOSIS — A41.9 SEPSIS (HCC): ICD-10-CM

## 2024-09-29 DIAGNOSIS — R79.89 ELEVATED LACTIC ACID LEVEL: ICD-10-CM

## 2024-09-29 DIAGNOSIS — R78.81 BACTEREMIA DUE TO ESCHERICHIA COLI: ICD-10-CM

## 2024-09-29 DIAGNOSIS — N12 PYELONEPHRITIS: Primary | ICD-10-CM

## 2024-09-29 DIAGNOSIS — E83.42 HYPOMAGNESEMIA: ICD-10-CM

## 2024-09-29 DIAGNOSIS — K57.30 DIVERTICULOSIS OF COLON: ICD-10-CM

## 2024-09-29 DIAGNOSIS — E87.6 HYPOKALEMIA: ICD-10-CM

## 2024-09-29 DIAGNOSIS — R09.82 PND (POST-NASAL DRIP): ICD-10-CM

## 2024-09-29 DIAGNOSIS — K21.9 GASTROESOPHAGEAL REFLUX DISEASE WITHOUT ESOPHAGITIS: ICD-10-CM

## 2024-09-29 DIAGNOSIS — N39.0 SEPSIS SECONDARY TO UTI  (HCC): ICD-10-CM

## 2024-09-29 DIAGNOSIS — R78.89 ELEVATED BETA-HYDROXYBUTYRATE: ICD-10-CM

## 2024-09-29 DIAGNOSIS — R78.81 BACTEREMIA: ICD-10-CM

## 2024-09-29 DIAGNOSIS — E11.65 HYPERGLYCEMIA DUE TO TYPE 2 DIABETES MELLITUS (HCC): ICD-10-CM

## 2024-09-29 LAB
ALBUMIN SERPL BCG-MCNC: 4.5 G/DL (ref 3.5–5)
ALP SERPL-CCNC: 65 U/L (ref 34–104)
ALT SERPL W P-5'-P-CCNC: 33 U/L (ref 7–52)
ANION GAP SERPL CALCULATED.3IONS-SCNC: 15 MMOL/L (ref 4–13)
AST SERPL W P-5'-P-CCNC: 19 U/L (ref 13–39)
B-OH-BUTYR SERPL-MCNC: 0.59 MMOL/L (ref 0.02–0.27)
BACTERIA UR QL AUTO: ABNORMAL /HPF
BASE EX.OXY STD BLDV CALC-SCNC: 88.2 % (ref 60–80)
BASE EXCESS BLDV CALC-SCNC: -0.1 MMOL/L
BASOPHILS # BLD AUTO: 0.07 THOUSANDS/ÂΜL (ref 0–0.1)
BASOPHILS NFR BLD AUTO: 1 % (ref 0–1)
BILIRUB DIRECT SERPL-MCNC: 0.12 MG/DL (ref 0–0.2)
BILIRUB SERPL-MCNC: 0.65 MG/DL (ref 0.2–1)
BILIRUB UR QL STRIP: NEGATIVE
BUN SERPL-MCNC: 19 MG/DL (ref 5–25)
CALCIUM SERPL-MCNC: 10.1 MG/DL (ref 8.4–10.2)
CHLORIDE SERPL-SCNC: 95 MMOL/L (ref 96–108)
CK SERPL-CCNC: 53 U/L (ref 26–192)
CLARITY UR: ABNORMAL
CO2 SERPL-SCNC: 25 MMOL/L (ref 21–32)
COLOR UR: COLORLESS
CREAT SERPL-MCNC: 0.84 MG/DL (ref 0.6–1.3)
EOSINOPHIL # BLD AUTO: 0.11 THOUSAND/ÂΜL (ref 0–0.61)
EOSINOPHIL NFR BLD AUTO: 1 % (ref 0–6)
ERYTHROCYTE [DISTWIDTH] IN BLOOD BY AUTOMATED COUNT: 11.8 % (ref 11.6–15.1)
EST. AVERAGE GLUCOSE BLD GHB EST-MCNC: 283 MG/DL
GFR SERPL CREATININE-BSD FRML MDRD: 75 ML/MIN/1.73SQ M
GLUCOSE SERPL-MCNC: 264 MG/DL (ref 65–140)
GLUCOSE SERPL-MCNC: 272 MG/DL (ref 65–140)
GLUCOSE SERPL-MCNC: 331 MG/DL (ref 65–140)
GLUCOSE UR STRIP-MCNC: ABNORMAL MG/DL
HBA1C MFR BLD: 11.5 %
HCO3 BLDV-SCNC: 21.9 MMOL/L (ref 24–30)
HCT VFR BLD AUTO: 46.2 % (ref 34.8–46.1)
HGB BLD-MCNC: 15.7 G/DL (ref 11.5–15.4)
HGB UR QL STRIP.AUTO: ABNORMAL
IMM GRANULOCYTES # BLD AUTO: 0.04 THOUSAND/UL (ref 0–0.2)
IMM GRANULOCYTES NFR BLD AUTO: 0 % (ref 0–2)
INR PPP: 0.95 (ref 0.85–1.19)
KETONES UR STRIP-MCNC: ABNORMAL MG/DL
LACTATE SERPL-SCNC: 1.7 MMOL/L (ref 0.5–2)
LACTATE SERPL-SCNC: 3 MMOL/L (ref 0.5–2)
LEUKOCYTE ESTERASE UR QL STRIP: ABNORMAL
LIPASE SERPL-CCNC: 52 U/L (ref 11–82)
LYMPHOCYTES # BLD AUTO: 2.31 THOUSANDS/ÂΜL (ref 0.6–4.47)
LYMPHOCYTES NFR BLD AUTO: 22 % (ref 14–44)
MAGNESIUM SERPL-MCNC: 1.5 MG/DL (ref 1.9–2.7)
MCH RBC QN AUTO: 32.1 PG (ref 26.8–34.3)
MCHC RBC AUTO-ENTMCNC: 34 G/DL (ref 31.4–37.4)
MCV RBC AUTO: 95 FL (ref 82–98)
MONOCYTES # BLD AUTO: 1.4 THOUSAND/ÂΜL (ref 0.17–1.22)
MONOCYTES NFR BLD AUTO: 13 % (ref 4–12)
NEUTROPHILS # BLD AUTO: 6.62 THOUSANDS/ÂΜL (ref 1.85–7.62)
NEUTS SEG NFR BLD AUTO: 63 % (ref 43–75)
NITRITE UR QL STRIP: NEGATIVE
NON-SQ EPI CELLS URNS QL MICRO: ABNORMAL /HPF
NRBC BLD AUTO-RTO: 0 /100 WBCS
O2 CT BLDV-SCNC: 19.5 ML/DL
PCO2 BLDV: 29.6 MM HG (ref 42–50)
PH BLDV: 7.49 [PH] (ref 7.3–7.4)
PH UR STRIP.AUTO: 5.5 [PH]
PLATELET # BLD AUTO: 296 THOUSANDS/UL (ref 149–390)
PLATELET # BLD AUTO: 319 THOUSANDS/UL (ref 149–390)
PMV BLD AUTO: 10.2 FL (ref 8.9–12.7)
PMV BLD AUTO: 10.4 FL (ref 8.9–12.7)
PO2 BLDV: 53.2 MM HG (ref 35–45)
POTASSIUM SERPL-SCNC: 3.5 MMOL/L (ref 3.5–5.3)
PROCALCITONIN SERPL-MCNC: 0.11 NG/ML
PROT SERPL-MCNC: 7.5 G/DL (ref 6.4–8.4)
PROT UR STRIP-MCNC: ABNORMAL MG/DL
PROTHROMBIN TIME: 13.2 SECONDS (ref 12.3–15)
RBC # BLD AUTO: 4.89 MILLION/UL (ref 3.81–5.12)
RBC #/AREA URNS AUTO: ABNORMAL /HPF
SODIUM SERPL-SCNC: 135 MMOL/L (ref 135–147)
SP GR UR STRIP.AUTO: <1.005 (ref 1–1.03)
TSH SERPL DL<=0.05 MIU/L-ACNC: 0.75 UIU/ML (ref 0.45–4.5)
UROBILINOGEN UR STRIP-ACNC: <2 MG/DL
WBC # BLD AUTO: 10.55 THOUSAND/UL (ref 4.31–10.16)
WBC #/AREA URNS AUTO: ABNORMAL /HPF

## 2024-09-29 PROCEDURE — 87181 SC STD AGAR DILUTION PER AGT: CPT | Performed by: EMERGENCY MEDICINE

## 2024-09-29 PROCEDURE — 83690 ASSAY OF LIPASE: CPT | Performed by: EMERGENCY MEDICINE

## 2024-09-29 PROCEDURE — 84443 ASSAY THYROID STIM HORMONE: CPT

## 2024-09-29 PROCEDURE — 85049 AUTOMATED PLATELET COUNT: CPT

## 2024-09-29 PROCEDURE — 87154 CUL TYP ID BLD PTHGN 6+ TRGT: CPT | Performed by: EMERGENCY MEDICINE

## 2024-09-29 PROCEDURE — 82550 ASSAY OF CK (CPK): CPT | Performed by: EMERGENCY MEDICINE

## 2024-09-29 PROCEDURE — 99285 EMERGENCY DEPT VISIT HI MDM: CPT | Performed by: EMERGENCY MEDICINE

## 2024-09-29 PROCEDURE — 82010 KETONE BODYS QUAN: CPT | Performed by: EMERGENCY MEDICINE

## 2024-09-29 PROCEDURE — 96367 TX/PROPH/DG ADDL SEQ IV INF: CPT

## 2024-09-29 PROCEDURE — 36415 COLL VENOUS BLD VENIPUNCTURE: CPT | Performed by: EMERGENCY MEDICINE

## 2024-09-29 PROCEDURE — 87186 SC STD MICRODIL/AGAR DIL: CPT | Performed by: EMERGENCY MEDICINE

## 2024-09-29 PROCEDURE — 81001 URINALYSIS AUTO W/SCOPE: CPT | Performed by: EMERGENCY MEDICINE

## 2024-09-29 PROCEDURE — 87040 BLOOD CULTURE FOR BACTERIA: CPT | Performed by: EMERGENCY MEDICINE

## 2024-09-29 PROCEDURE — 99223 1ST HOSP IP/OBS HIGH 75: CPT | Performed by: INTERNAL MEDICINE

## 2024-09-29 PROCEDURE — 99285 EMERGENCY DEPT VISIT HI MDM: CPT

## 2024-09-29 PROCEDURE — 82805 BLOOD GASES W/O2 SATURATION: CPT | Performed by: EMERGENCY MEDICINE

## 2024-09-29 PROCEDURE — 85025 COMPLETE CBC W/AUTO DIFF WBC: CPT | Performed by: EMERGENCY MEDICINE

## 2024-09-29 PROCEDURE — 82948 REAGENT STRIP/BLOOD GLUCOSE: CPT

## 2024-09-29 PROCEDURE — 87086 URINE CULTURE/COLONY COUNT: CPT | Performed by: EMERGENCY MEDICINE

## 2024-09-29 PROCEDURE — 80076 HEPATIC FUNCTION PANEL: CPT | Performed by: EMERGENCY MEDICINE

## 2024-09-29 PROCEDURE — 83605 ASSAY OF LACTIC ACID: CPT | Performed by: EMERGENCY MEDICINE

## 2024-09-29 PROCEDURE — 74177 CT ABD & PELVIS W/CONTRAST: CPT

## 2024-09-29 PROCEDURE — 83735 ASSAY OF MAGNESIUM: CPT | Performed by: EMERGENCY MEDICINE

## 2024-09-29 PROCEDURE — 96365 THER/PROPH/DIAG IV INF INIT: CPT

## 2024-09-29 PROCEDURE — 87077 CULTURE AEROBIC IDENTIFY: CPT | Performed by: EMERGENCY MEDICINE

## 2024-09-29 PROCEDURE — 96368 THER/DIAG CONCURRENT INF: CPT

## 2024-09-29 PROCEDURE — 82043 UR ALBUMIN QUANTITATIVE: CPT

## 2024-09-29 PROCEDURE — 84145 PROCALCITONIN (PCT): CPT | Performed by: EMERGENCY MEDICINE

## 2024-09-29 PROCEDURE — 83036 HEMOGLOBIN GLYCOSYLATED A1C: CPT

## 2024-09-29 PROCEDURE — 80048 BASIC METABOLIC PNL TOTAL CA: CPT | Performed by: EMERGENCY MEDICINE

## 2024-09-29 PROCEDURE — 82570 ASSAY OF URINE CREATININE: CPT

## 2024-09-29 PROCEDURE — 85610 PROTHROMBIN TIME: CPT | Performed by: EMERGENCY MEDICINE

## 2024-09-29 RX ORDER — ESCITALOPRAM OXALATE 10 MG/1
5 TABLET ORAL DAILY
Status: DISCONTINUED | OUTPATIENT
Start: 2024-09-30 | End: 2024-10-02 | Stop reason: HOSPADM

## 2024-09-29 RX ORDER — CEFTRIAXONE 1 G/50ML
1000 INJECTION, SOLUTION INTRAVENOUS EVERY 24 HOURS
Status: DISCONTINUED | OUTPATIENT
Start: 2024-09-30 | End: 2024-09-30

## 2024-09-29 RX ORDER — INSULIN LISPRO 100 [IU]/ML
1-6 INJECTION, SOLUTION INTRAVENOUS; SUBCUTANEOUS
Status: DISCONTINUED | OUTPATIENT
Start: 2024-09-29 | End: 2024-09-30

## 2024-09-29 RX ORDER — SODIUM CHLORIDE, SODIUM GLUCONATE, SODIUM ACETATE, POTASSIUM CHLORIDE, MAGNESIUM CHLORIDE, SODIUM PHOSPHATE, DIBASIC, AND POTASSIUM PHOSPHATE .53; .5; .37; .037; .03; .012; .00082 G/100ML; G/100ML; G/100ML; G/100ML; G/100ML; G/100ML; G/100ML
2200 INJECTION, SOLUTION INTRAVENOUS ONCE
Status: COMPLETED | OUTPATIENT
Start: 2024-09-29 | End: 2024-09-29

## 2024-09-29 RX ORDER — METOPROLOL SUCCINATE 25 MG/1
25 TABLET, EXTENDED RELEASE ORAL DAILY
Status: DISCONTINUED | OUTPATIENT
Start: 2024-09-30 | End: 2024-10-02 | Stop reason: HOSPADM

## 2024-09-29 RX ORDER — FAMOTIDINE 20 MG/1
40 TABLET, FILM COATED ORAL 2 TIMES DAILY
Status: DISCONTINUED | OUTPATIENT
Start: 2024-09-29 | End: 2024-10-02 | Stop reason: HOSPADM

## 2024-09-29 RX ORDER — FLUTICASONE PROPIONATE 50 MCG
2 SPRAY, SUSPENSION (ML) NASAL DAILY
Status: DISCONTINUED | OUTPATIENT
Start: 2024-09-30 | End: 2024-10-02 | Stop reason: HOSPADM

## 2024-09-29 RX ORDER — LOSARTAN POTASSIUM 50 MG/1
100 TABLET ORAL DAILY
Status: DISCONTINUED | OUTPATIENT
Start: 2024-09-30 | End: 2024-09-29

## 2024-09-29 RX ORDER — AMLODIPINE BESYLATE 5 MG/1
5 TABLET ORAL DAILY
Status: DISCONTINUED | OUTPATIENT
Start: 2024-09-30 | End: 2024-10-02 | Stop reason: HOSPADM

## 2024-09-29 RX ORDER — CEFTRIAXONE 1 G/50ML
1000 INJECTION, SOLUTION INTRAVENOUS ONCE
Status: COMPLETED | OUTPATIENT
Start: 2024-09-29 | End: 2024-09-29

## 2024-09-29 RX ORDER — HYDROCHLOROTHIAZIDE 12.5 MG/1
12.5 TABLET ORAL DAILY
Status: DISCONTINUED | OUTPATIENT
Start: 2024-09-30 | End: 2024-09-29

## 2024-09-29 RX ORDER — LANOLIN ALCOHOL/MO/W.PET/CERES
400 CREAM (GRAM) TOPICAL DAILY
Status: DISCONTINUED | OUTPATIENT
Start: 2024-09-30 | End: 2024-10-02 | Stop reason: HOSPADM

## 2024-09-29 RX ORDER — DICYCLOMINE HCL 20 MG
20 TABLET ORAL 2 TIMES DAILY
Status: DISCONTINUED | OUTPATIENT
Start: 2024-09-30 | End: 2024-10-02 | Stop reason: HOSPADM

## 2024-09-29 RX ORDER — ATORVASTATIN CALCIUM 10 MG/1
10 TABLET, FILM COATED ORAL
Status: DISCONTINUED | OUTPATIENT
Start: 2024-09-29 | End: 2024-09-29

## 2024-09-29 RX ORDER — ACETAMINOPHEN 325 MG/1
650 TABLET ORAL EVERY 6 HOURS PRN
Status: DISCONTINUED | OUTPATIENT
Start: 2024-09-29 | End: 2024-10-02 | Stop reason: HOSPADM

## 2024-09-29 RX ORDER — IPRATROPIUM BROMIDE 42 UG/1
2 SPRAY, METERED NASAL 3 TIMES DAILY
Status: DISCONTINUED | OUTPATIENT
Start: 2024-09-29 | End: 2024-10-02 | Stop reason: HOSPADM

## 2024-09-29 RX ORDER — ONDANSETRON 2 MG/ML
4 INJECTION INTRAMUSCULAR; INTRAVENOUS EVERY 6 HOURS PRN
Status: DISCONTINUED | OUTPATIENT
Start: 2024-09-29 | End: 2024-10-02 | Stop reason: HOSPADM

## 2024-09-29 RX ORDER — ATORVASTATIN CALCIUM 40 MG/1
80 TABLET, FILM COATED ORAL
Status: DISCONTINUED | OUTPATIENT
Start: 2024-09-29 | End: 2024-09-29

## 2024-09-29 RX ORDER — SODIUM CHLORIDE 9 MG/ML
50 INJECTION, SOLUTION INTRAVENOUS CONTINUOUS
Status: DISCONTINUED | OUTPATIENT
Start: 2024-09-29 | End: 2024-10-02

## 2024-09-29 RX ORDER — ENOXAPARIN SODIUM 100 MG/ML
40 INJECTION SUBCUTANEOUS DAILY
Status: DISCONTINUED | OUTPATIENT
Start: 2024-09-29 | End: 2024-10-02 | Stop reason: HOSPADM

## 2024-09-29 RX ORDER — MAGNESIUM SULFATE HEPTAHYDRATE 40 MG/ML
2 INJECTION, SOLUTION INTRAVENOUS
Status: COMPLETED | OUTPATIENT
Start: 2024-09-29 | End: 2024-09-30

## 2024-09-29 RX ORDER — ASPIRIN 81 MG/1
81 TABLET, CHEWABLE ORAL DAILY
Status: DISCONTINUED | OUTPATIENT
Start: 2024-09-30 | End: 2024-10-02 | Stop reason: HOSPADM

## 2024-09-29 RX ADMIN — INSULIN LISPRO 4 UNITS: 100 INJECTION, SOLUTION INTRAVENOUS; SUBCUTANEOUS at 17:16

## 2024-09-29 RX ADMIN — SODIUM CHLORIDE, SODIUM GLUCONATE, SODIUM ACETATE, POTASSIUM CHLORIDE, MAGNESIUM CHLORIDE, SODIUM PHOSPHATE, DIBASIC, AND POTASSIUM PHOSPHATE 2200 ML: .53; .5; .37; .037; .03; .012; .00082 INJECTION, SOLUTION INTRAVENOUS at 12:31

## 2024-09-29 RX ADMIN — FAMOTIDINE 40 MG: 20 TABLET, FILM COATED ORAL at 17:16

## 2024-09-29 RX ADMIN — IOHEXOL 100 ML: 350 INJECTION, SOLUTION INTRAVENOUS at 12:43

## 2024-09-29 RX ADMIN — IPRATROPIUM BROMIDE 2 SPRAY: 42 SPRAY, METERED NASAL at 20:43

## 2024-09-29 RX ADMIN — MAGNESIUM SULFATE HEPTAHYDRATE 2 G: 40 INJECTION, SOLUTION INTRAVENOUS at 12:37

## 2024-09-29 RX ADMIN — ENOXAPARIN SODIUM 40 MG: 40 INJECTION SUBCUTANEOUS at 17:16

## 2024-09-29 RX ADMIN — CEFTRIAXONE 1000 MG: 1 INJECTION, SOLUTION INTRAVENOUS at 12:10

## 2024-09-29 RX ADMIN — MAGNESIUM SULFATE HEPTAHYDRATE 2 G: 40 INJECTION, SOLUTION INTRAVENOUS at 14:10

## 2024-09-29 RX ADMIN — SODIUM CHLORIDE 100 ML/HR: 0.9 INJECTION, SOLUTION INTRAVENOUS at 17:15

## 2024-09-29 NOTE — H&P
H&P - Hospitalist   Name: Korin Wilhelm 59 y.o. female I MRN: [id number]  Unit/Bed#: 421-01 I Date of Admission: 9/29/2024   Date of Service: 9/29/2024 I Hospital Day: 0     Assessment & Plan  Sepsis secondary to UTI  (HCC)  Patient currently managed for urosepsis, after presenting with 15 days intermittent history of burning micturition, frequency,hesitancy and suprapubic heaviness while on cruise trip to [place]. Initially managed with 1 week course of ciprofloxacin but symptoms returned again yesterday along with hematuria which prompted a visit to the ED. Patient denies any fever, vomiting or flank pain   Examination reveals dry coated tongue, mild tenderness in hypogastric region on deep palpation, no rebound, no guarding no CVA tenderness.  CBC; wbc 10.55 with predominant neutrophils, CMP normal, Mg 1.5  Urine RE : positive leucocytes, RBCs and LA, urine culture awaited  Initial lactic acid 3, then 1.7(N)---- likely due to metformin   Pro Ji : normal   CT scan : IMPRESSION: 1. No acute inflammatory or infectious process. 2. Diverticulosis coli without diverticulitis.  Managed with 1 dose of ceftriaxone and fluids in ED    Plan:  Ceftriaxone 1000 mg once a day till culture results   IVF NS @ 100ml hr  Encourage fluids per oral   Acetaminophen for pain/ fever PRN    Ondansetron for nausea/Vomiting PRN    F/U Urine culture, Trend Q-CRP daily       Type 2 diabetes mellitus without complication, without long-term current use of insulin ([facility])  Lab Results   Component Value Date    HGBA1C 8.5 (H) 02/14/2024     Known Diabetic since last 10 years   Managed with Metformin 1000 mg BID and Trulicity once a week at home   , VBG PH: 7.488, Anion gap 15, bicarbonate 22, urine ketones positive, (likely dehydration vs mild DKA in context of underlying UTI)  Diabetic education per nutritionist   Insulin sliding scale 3 times a day   Fluid boluses in ED, encourage fluids orally now   F/U blood sugars, Hb A1c,  albumin to creatinine ration and lipid panel  Consider high dose statin tomorrow      Blood Sugar Average: Last 72 hrs: 331 mg/dl    Primary hypertension  Chronic and well controlled per patient   Continue home medication amlodipine   Withhold Valsartan/HCTZ till dehydration subsides     VTE Pharmacologic Prophylaxis: VTE Score: 4 Moderate Risk (Score 3-4) - Pharmacological DVT Prophylaxis Ordered: enoxaparin (Lovenox).  Code Status: Level 1 - Full Code   Discussion with family: Updated  (significant other) via phone.    Anticipated Length of Stay: Patient will be admitted on an inpatient basis with an anticipated length of stay of greater than 2 midnights secondary to urosepsis.    History of Present Illness   Chief Complaint: burning micturition with hematuria for 15 days on/off    Korin Wilhelm is a 59 y.o. female with a PMH of DM/HTN/Seasonal allergies who presents with 15 day history of burning micturition and hematuria intermittently. According to the patient she developed these symptoms while on a cruise ship in [place] where she had to walk long distances and had less water intake. Initially it started with burning micturition, increasing frequency, pressure in hypogastrium and hesitancy later progressed to hematuria which prompted a visit to doctor on cruise. He prescribed her a course of ciprofloxacin which the patient was compliant with. The hematuria subsided but burning micturition persisted, she again developed hematuria yesterday prompting a visit to the ED. Patient denies any fever, nausea, vomiting or flank pain.   Patient up to date with vaccination and denies any recent sick contact     Review of Systems   Constitutional:  Negative for appetite change, fatigue and fever.   HENT: Negative.     Eyes: Negative.    Respiratory: Negative.     Cardiovascular: Negative.    Gastrointestinal:  Negative for abdominal distention, abdominal pain, nausea and vomiting.   Genitourinary:   Positive for decreased urine volume, dysuria, flank pain, frequency, hematuria and pelvic pain.   Musculoskeletal:  Negative for back pain.   Neurological:  Positive for dizziness and headaches. Negative for weakness.   Psychiatric/Behavioral:  Positive for confusion. Negative for agitation. The patient is not nervous/anxious.        I have reviewed the patient's PMH, PSH, Social History, Family History, Meds, and Allergies  Historical Information   Past Medical History:   Diagnosis Date    Diabetes mellitus (HCC)     Ear problems Year and a half    High cholesterol     Hypertension     Sinusitis      Past Surgical History:   Procedure Laterality Date    HYSTERECTOMY      TONSILLECTOMY  5 years opd     Social History     Tobacco Use    Smoking status: Former     Current packs/day: 0.00     Average packs/day: 1.5 packs/day for 25.7 years (38.5 ttl pk-yrs)     Types: Cigarettes     Start date: 1984     Quit date: 4/10/2010     Years since quittin.4    Smokeless tobacco: Never   Vaping Use    Vaping status: Never Used   Substance and Sexual Activity    Alcohol use: Never    Drug use: Never    Sexual activity: Yes     Partners: Male     Birth control/protection: None     E-Cigarette/Vaping    E-Cigarette Use Never User      E-Cigarette/Vaping Substances     Family History   Problem Relation Age of Onset    Diabetes Mother     Diabetes Father     Hypertension Father      Social History     Tobacco Use    Smoking status: Former     Current packs/day: 0.00     Average packs/day: 1.5 packs/day for 25.7 years (38.5 ttl pk-yrs)     Types: Cigarettes     Start date: 1984     Quit date: 4/10/2010     Years since quittin.4    Smokeless tobacco: Never   Vaping Use    Vaping status: Never Used   Substance and Sexual Activity    Alcohol use: Never    Drug use: Never    Sexual activity: Yes     Partners: Male     Birth control/protection: None       Current Facility-Administered Medications:     acetaminophen  (TYLENOL) tablet 650 mg, Q6H PRN    [START ON 2024] amLODIPine (NORVASC) tablet 5 mg, Daily    [START ON 2024] aspirin chewable tablet 81 mg, Daily    [START ON 2024] cefTRIAXone (ROCEPHIN) IVPB (premix in dextrose) 1,000 mg 50 mL, Q24H    [START ON 2024] dicyclomine (BENTYL) tablet 20 mg, BID    enoxaparin (LOVENOX) subcutaneous injection 40 mg, Daily    [START ON 2024] escitalopram (LEXAPRO) tablet 5 mg, Daily    famotidine (PEPCID) tablet 40 mg, BID    [START ON 2024] fluticasone (FLONASE) 50 mcg/act nasal spray 2 spray, Daily    [START ON 2024] folic acid (FOLVITE) tablet 400 mcg, Daily    insulin lispro (HumALOG/ADMELOG) 100 units/mL subcutaneous injection 1-6 Units, 4 times day **AND** Fingerstick Glucose (POCT), 4 times day    ipratropium (ATROVENT) 0.06 % nasal spray 2 spray, TID    [START ON 2024] metoprolol succinate (TOPROL-XL) 24 hr tablet 25 mg, Daily    ondansetron (ZOFRAN) injection 4 mg, Q6H PRN  Prior to Admission Medications   Prescriptions Last Dose Informant Patient Reported? Taking?   Aspirin Low Dose 81 MG EC tablet 2024 Self No Yes   Sig: TAKE ONE TABLET BY MOUTH EVERY DAY   amLODIPine (NORVASC) 5 mg tablet 2024 Self No Yes   Sig: Take 1 tablet (5 mg total) by mouth daily   dicyclomine (BENTYL) 20 mg tablet 2024 Self No Yes   Sig: Take 1 tablet (20 mg total) by mouth 2 (two) times a day   dulaglutide (Trulicity) 1.5 MG/0.5ML injection Past Week Self No Yes   Sig: Inject 0.5 mL (1.5 mg total) under the skin every 7 days   escitalopram (LEXAPRO) 5 mg tablet 2024 Self No Yes   Sig: TAKE ONE TABLET BY MOUTH EVERY DAY   famotidine (PEPCID) 40 MG tablet 2024  No Yes   Sig: Take 1 tablet (40 mg total) by mouth 2 (two) times a day   fluticasone (FLONASE) 50 mcg/act nasal spray 2024  No Yes   Si sprays into each nostril daily   folic acid (FOLVITE) 800 MCG tablet 2024  Yes Yes   Sig: Take 400 mcg by mouth daily   glucose  "blood (Accu-Chek Guide) test strip  Self No No   Sig: Use as instructed   ipratropium (ATROVENT) 0.03 % nasal spray   No No   Si sprays into each nostril 3 (three) times a day   metFORMIN (GLUCOPHAGE) 1000 MG tablet 2024 Self No Yes   Sig: Take 1 tablet (1,000 mg total) by mouth 2 (two) times a day with meals   metoprolol succinate (TOPROL-XL) 25 mg 24 hr tablet 2024 Self No Yes   Sig: Take 1 tablet (25 mg total) by mouth daily   valsartan-hydrochlorothiazide (DIOVAN-HCT) 160-12.5 MG per tablet 2024  Yes Yes   Sig: Take 1 tablet by mouth daily      Facility-Administered Medications: None     Jardiance [empagliflozin] and Ozempic (0.25 or 0.5 mg-dose) [semaglutide(0.25 or 0.5mg-dos)]  Social History:  Marital Status: /Civil Union   Occupation: Retired   Patient Pre-hospital Living Situation: Home  Patient Pre-hospital Level of Mobility: walks  Patient Pre-hospital Diet Restrictions: Diabetic diet    Objective   Vitals:   Blood Pressure: 144/83 (24 1450)  Pulse: (!) 119 (24 1450)  Temperature: 98.8 °F (37.1 °C) (24 1450)  Temp Source: Temporal (24 1115)  Respirations: 17 (24 1450)  Height: 5' 11\" (180.3 cm) (24 1450)  Weight - Scale: 97.9 kg (215 lb 13.3 oz) (24 1450)  SpO2: 98 % (24 1450)    Physical Exam  Constitutional:       Appearance: She is obese.   HENT:      Head: Normocephalic and atraumatic.      Nose: Nose normal.   Eyes:      Extraocular Movements: Extraocular movements intact.      Conjunctiva/sclera: Conjunctivae normal.      Pupils: Pupils are equal, round, and reactive to light.   Cardiovascular:      Rate and Rhythm: Normal rate and regular rhythm.      Pulses: Normal pulses.      Heart sounds: Normal heart sounds.   Pulmonary:      Effort: Pulmonary effort is normal.      Breath sounds: Normal breath sounds.   Abdominal:      General: There is no distension.      Tenderness: There is abdominal tenderness in the suprapubic " area. There is no right CVA tenderness, left CVA tenderness, guarding or rebound.   Musculoskeletal:         General: Normal range of motion.      Cervical back: Normal range of motion and neck supple.   Skin:     General: Skin is warm and dry.      Capillary Refill: Capillary refill takes less than 2 seconds.   Neurological:      General: No focal deficit present.      Mental Status: She is alert and oriented to person, place, and time.   Psychiatric:         Mood and Affect: Mood normal.         Behavior: Behavior normal.         Thought Content: Thought content normal.         Judgment: Judgment normal.           Additional Data:   Lab Results: I have reviewed the following results:   Results from last 7 days   Lab Units 09/29/24  1555 09/29/24  1150   WBC Thousand/uL  --  10.55*   HEMOGLOBIN g/dL  --  15.7*   HEMATOCRIT %  --  46.2*   PLATELETS Thousands/uL 296 319   SEGS PCT %  --  63   LYMPHO PCT %  --  22   MONO PCT %  --  13*   EOS PCT %  --  1     Results from last 7 days   Lab Units 09/29/24  1150 09/29/24  1136   SODIUM mmol/L 135  --    POTASSIUM mmol/L 3.5  --    CHLORIDE mmol/L 95*  --    CO2 mmol/L 25  --    BUN mg/dL 19  --    CREATININE mg/dL 0.84  --    ANION GAP mmol/L 15*  --    CALCIUM mg/dL 10.1  --    ALBUMIN g/dL  --  4.5   TOTAL BILIRUBIN mg/dL  --  0.65   ALK PHOS U/L  --  65   ALT U/L  --  33   AST U/L  --  19   GLUCOSE RANDOM mg/dL 331*  --      Results from last 7 days   Lab Units 09/29/24  1334   INR  0.95         Lab Results   Component Value Date    HGBA1C 8.5 (H) 02/14/2024    HGBA1C 7.6 (H) 08/15/2023    HGBA1C 7.2 (H) 05/15/2023     Results from last 7 days   Lab Units 09/29/24  1408 09/29/24  1150 09/29/24  1136   LACTIC ACID mmol/L 1.7 3.0*  --    PROCALCITONIN ng/ml  --   --  0.11       Imaging Review: Reviewed radiology reports from this admission including: CT abdomen/pelvis.  Other Studies: No additional pertinent studies reviewed.    Administrative Statements   I have spent  a total time of 60 minutes in caring for this patient on the day of the visit/encounter including Diagnostic results, Prognosis, Patient and family education, Documenting in the medical record, Reviewing / ordering tests, medicine, procedures  , Obtaining or reviewing history  , and Communicating with other healthcare professionals .    ** Please Note: This note has been constructed using a voice recognition system. **

## 2024-09-29 NOTE — PLAN OF CARE
Problem: PAIN - ADULT  Goal: Verbalizes/displays adequate comfort level or baseline comfort level  Description: Interventions:  - Encourage patient to monitor pain and request assistance  - Assess pain using appropriate pain scale  - Administer analgesics based on type and severity of pain and evaluate response  - Implement non-pharmacological measures as appropriate and evaluate response  - Consider cultural and social influences on pain and pain management  - Notify physician/advanced practitioner if interventions unsuccessful or patient reports new pain  Outcome: Progressing     Problem: INFECTION - ADULT  Goal: Absence or prevention of progression during hospitalization  Description: INTERVENTIONS:  - Assess and monitor for signs and symptoms of infection  - Monitor lab/diagnostic results  - Monitor all insertion sites, i.e. indwelling lines, tubes, and drains  - Monitor endotracheal if appropriate and nasal secretions for changes in amount and color  - [place] appropriate cooling/warming therapies per order  - Administer medications as ordered  - Instruct and encourage patient and family to use good hand hygiene technique  - Identify and instruct in appropriate isolation precautions for identified infection/condition  Outcome: Progressing     Problem: DISCHARGE PLANNING  Goal: Discharge to home or other facility with appropriate resources  Description: INTERVENTIONS:  - Identify barriers to discharge w/patient and caregiver  - Arrange for needed discharge resources and transportation as appropriate  - Identify discharge learning needs (meds, wound care, etc.)  - Arrange for interpretive services to assist at discharge as needed  - Refer to Case Management Department for coordinating discharge planning if the patient needs post-hospital services based on physician/advanced practitioner order or complex needs related to functional status, cognitive ability, or social support system  Outcome: Progressing      Problem: Knowledge Deficit  Goal: Patient/family/caregiver demonstrates understanding of disease process, treatment plan, medications, and discharge instructions  Description: Complete learning assessment and assess knowledge base.  Interventions:  - Provide teaching at level of understanding  - Provide teaching via preferred learning methods  Outcome: Progressing     Problem: GENITOURINARY - ADULT  Goal: Maintains or returns to baseline urinary function  Description: INTERVENTIONS:  - Assess urinary function  - Encourage oral fluids to ensure adequate hydration if ordered  - Administer IV fluids as ordered to ensure adequate hydration  - Administer ordered medications as needed  - Offer frequent toileting  - Follow urinary retention protocol if ordered  Outcome: Progressing

## 2024-09-29 NOTE — ASSESSMENT & PLAN NOTE
Lab Results   Component Value Date    HGBA1C 8.5 (H) 02/14/2024     Known Diabetic since last 10 years   Managed with Metformin 1000 mg BID and Trulicity once a week at home   , VBG PH: 7.488, Anion gap 15, bicarbonate 22, urine ketones positive, (likely dehydration vs mild DKA in context of underlying UTI)  Diabetic education per nutritionist   Insulin sliding scale 3 times a day   Fluid boluses in ED, encourage fluids orally now   F/U blood sugars, Hb A1c, albumin to creatinine ration and lipid panel  Consider high dose statin tomorrow      Blood Sugar Average: Last 72 hrs: 331 mg/dl

## 2024-09-29 NOTE — ASSESSMENT & PLAN NOTE
Patient currently managed for urosepsis, after presenting with 15 days intermittent history of burning micturition, frequency,hesitancy and suprapubic heaviness while on cruise trip to [place]. Initially managed with 1 week course of ciprofloxacin but symptoms returned again yesterday along with hematuria which prompted a visit to the ED. Patient denies any fever, vomiting or flank pain   Examination reveals dry coated tongue, mild tenderness in hypogastric region on deep palpation, no rebound, no guarding no CVA tenderness.  CBC; wbc 10.55 with predominant neutrophils, CMP normal, Mg 1.5  Urine RE : positive leucocytes, RBCs and LA, urine culture awaited  Initial lactic acid 3, then 1.7(N)---- likely due to metformin   Pro Ji : normal   CT scan : IMPRESSION: 1. No acute inflammatory or infectious process. 2. Diverticulosis coli without diverticulitis.  Managed with 1 dose of ceftriaxone and fluids in ED    Plan:  Ceftriaxone 1000 mg once a day till culture results   IVF NS @ 100ml hr  Encourage fluids per oral   Acetaminophen for pain/ fever PRN    Ondansetron for nausea/Vomiting PRN    F/U Urine culture, Trend Q-CRP daily

## 2024-09-29 NOTE — ED PROVIDER NOTES
Final diagnoses:   Pyelonephritis   Hypomagnesemia   Elevated lactic acid level   Elevated beta-hydroxybutyrate   Hyperglycemia due to type 2 diabetes mellitus (HCC)   Sepsis (HCC)     ED Disposition       ED Disposition   Admit    Condition   Stable    Date/Time   Sun Sep 29, 2024  2:33 PM    Comment   Case was discussed with Dr Simental and the patient's admission status was agreed to be Admission Status: inpatient status to the service of Dr. Simental.               Assessment & Plan       Medical Decision Making  Recent lower urinary tract infection with completion of reasonable antibiotic therapy but with some symptoms suggestive of recurrent or continued infection.  She is demonstrating significant resting tachycardia concerning for a systemic reaction/SIRS reaction in the setting of infection.  Check CBC/BMP/lactic acid.  Check CK as it may have been elevated given her recent activity, although the timeframe since then would almost certainly suggest a return to normal value.  UA.  Imaging depending upon results of workup.  Disposition accordingly.    Amount and/or Complexity of Data Reviewed  Labs: ordered. Decision-making details documented in ED Course.  Radiology: ordered. Decision-making details documented in ED Course.    Risk  Prescription drug management.  Decision regarding hospitalization.        ED Course as of 09/29/24 1541   Sun Sep 29, 2024   1142 UA w Reflex to Microscopic w Reflex to Culture(!)  Dilute sample.  Large blood/leukocytes with trace ketones and protein.  Glucose urea.   1150 Several urine cultures from 2022/2023 showed 10K-39K CFU/mL of mixed contaminants with no speciation identified at any point.   1152 Urine Microscopic(!)   1158 CBC and differential(!)  Mild leukocytosis.  Hemoglobin/hematocrit elevated, likely hemoconcentrated.  Platelets normal.   1205 Sufficient suspicion for infectious source that I will provide empiric antibiotic therapy while workup is ongoing.  Will give IV  ceftriaxone.   1225 Lactic acid, plasma (w/reflex if result > 2.0)(!)  Significantly elevated; repeat after IV fluid administration   1225 Magnesium(!)  Hypomagnesemia; replete intravenously   1225 Basic metabolic panel(!)  Elevated anion gap.  Hyperglycemia   1225 CK  Normal   1226 The 30ml/kg fluid bolus was not given to the patient despite hypotension and/or significantly elevated lactate of = 4 and/or presence of septic shock due to: Concern for fluid/volume overload. The patient will be administered 2.2 L of crystalloid fluid instead. Orders for this have been placed in [facility]. The patient may receive additional colloid or crystalloid fluids thereafter based on clinical condition.     Virgil Flowers, DO     1227 Multiple electrolyte disturbances and findings concerning for early DKA in setting of urinary tract infection.  Will obtain CT of the abdomen/pelvis and check beta hydroxybutyrate/VBG to better characterize acid-base status.  Will need additional IV fluid administration and recheck of lactic acid.  Low threshold for hospitalization.   1248 Blood gas, venous(!)  Respiratory alkalosis with metabolic compensation  Hyperoxia   1249 Beta Hydroxybutyrate(!)  Elevated   1250 CT completed and awaiting interpretation.  Per my review of CT, no hepatic/splenic/bowel/pancreatic abnormality.  Bilateral perinephric stranding without obstructive uropathy.  No nephrolithiasis or ureterolithiasis.  No free air or free fluid.  No bowel obstruction.  Discussed with patient my recommendation for hospitalization in setting of sepsis.  She was agreeable to this.  Will await CT report and then plan to admit.   1405 Protime-INR  Normal   1407 Contacted radiology reading room regarding CT interpretation   1419 CT abdomen pelvis with contrast  FINDINGS:     ABDOMEN     LOWER CHEST: No clinically significant abnormality in the visualized lower chest.     LIVER/BILIARY TREE: Enlarged fatty liver noted.     GALLBLADDER: No calcified  gallstones. No pericholecystic inflammatory change.     SPLEEN: Unremarkable.     PANCREAS: Unremarkable.     ADRENAL GLANDS: Unremarkable.     KIDNEYS/URETERS: Unremarkable. No hydronephrosis.     STOMACH AND BOWEL: Colonic diverticulosis without findings of acute diverticulitis.     APPENDIX: Normal.     ABDOMINOPELVIC CAVITY: No ascites. No pneumoperitoneum. No lymphadenopathy.     VESSELS: Unremarkable for patient's age.     PELVIS     REPRODUCTIVE ORGANS: Unremarkable for patient's age.     URINARY BLADDER: Unremarkable.     ABDOMINAL WALL/INGUINAL REGIONS: Unremarkable.     BONES: No acute fracture or suspicious osseous lesion.     IMPRESSION:  1. No acute inflammatory or infectious process.  2. Diverticulosis coli without diverticulitis.        Workstation performed: [id number]     1419 CT read as showing no acute pathology.  There is no evidence of alternate pathology for patient symptoms.  Will proceed with admission as previously discussed.   1421 Procalcitonin   1421 Lipase   1421 Hepatic function panel   1421 Normal hepatic function/lipase/procalcitonin.  Secure message sent to internal medicine to discuss admission.   1437 Lactic acid 2 Hours  Normalized after IV fluid administration     D/w Dr Simental who accepted patient in inpatient admission med/surg bed.    Medications   magnesium sulfate 2 g/50 mL IVPB (premix) 2 g (2 g Intravenous New Bag 9/29/24 1410)   cefTRIAXone (ROCEPHIN) IVPB (premix in dextrose) 1,000 mg 50 mL (0 mg Intravenous Stopped 9/29/24 1254)   multi-electrolyte (ISOLYTE-S PH 7.4) bolus 2,200 mL (0 mL Intravenous Stopped 9/29/24 1408)   iohexol (OMNIPAQUE) 350 MG/ML injection (MULTI-DOSE) 100 mL (100 mL Intravenous Given 9/29/24 1243)       ED Risk Strat Scores               SBIRT 22yo+      Flowsheet Row Most Recent Value   Initial Alcohol Screen: US AUDIT-C     1. How often do you have a drink containing alcohol? 0 Filed at: 09/29/2024 1115   2. How many drinks containing alcohol do  you have on a typical day you are drinking?  0 Filed at: 2024 1115   3a. Male UNDER 65: How often do you have five or more drinks on one occasion? 0 Filed at: 2024 1115   3b. FEMALE Any Age, or MALE 65+: How often do you have 4 or more drinks on one occassion? 0 Filed at: 2024 1115   Audit-C Score 0 Filed at: 2024 1115   HUMAIRA: How many times in the past year have you...    Used an illegal drug or used a prescription medication for non-medical reasons? Never Filed at: 2024 1115                            History of Present Illness       Chief Complaint   Patient presents with    Possible UTI     Patient presents with lower abdominal pressure and vaginal bleeding. States she recently had a UTI, finished abx yesterday but feels it returned       Past Medical History:   Diagnosis Date    Diabetes mellitus (HCC)     Ear problems Year and a half    High cholesterol     Hypertension     Sinusitis       Past Surgical History:   Procedure Laterality Date    HYSTERECTOMY      TONSILLECTOMY  5 years opd      Family History   Problem Relation Age of Onset    Diabetes Mother     Diabetes Father     Hypertension Father       Social History     Tobacco Use    Smoking status: Former     Current packs/day: 0.00     Average packs/day: 1.5 packs/day for 25.7 years (38.5 ttl pk-yrs)     Types: Cigarettes     Start date: 1984     Quit date: 4/10/2010     Years since quittin.4    Smokeless tobacco: Never   Vaping Use    Vaping status: Never Used   Substance Use Topics    Alcohol use: Never    Drug use: Never      E-Cigarette/Vaping    E-Cigarette Use Never User       E-Cigarette/Vaping Substances      I have reviewed and agree with the history as documented.     58 y/o F with noted past medical hx presents to the emergency department with hematuria developing since yesterday.  She recently went on a trip to [place] followed by a cruise; on Saturday, , she walked around [employer]  for an extended period of time, covering a much greater distance with typical for her (about 65490 steps per her pedometer).  She reports drinking only 1 bottle of water that entire day.  She reports her urine was quite dark thereafter although with no visible hematuria and no dysuria. She then embarked on a 1 week cruise, during which time she significantly increased her water intake by her report. She had mild urgency/frequency without dysuria for most of the week. Toward the end of that week, she developed dysuria and small clots in the urine.  Was seen by ship medical staff on Saturday 21 September for symptoms and diagnosed with lower urinary tract infection for which she was prescribed a 7-day course of ciprofloxacin which she completed.  She had resolution of the hematuria within about 3 days of starting antibiotic therapy.  She has had some mild dysuria since that time however with recurrence of hematuria (again small clots when voiding) since yesterday.  No vaginal bleeding.  No prior bladder/urinary/kidney surgery.  Has had prior hysterectomy.  Does not have history of recurrent or frequent urinary infections.  No fevers or chills.  No nausea/vomiting.  No lightheadedness/syncopal sensation.        History provided by:  Patient and medical records      Review of Systems   Constitutional:  Negative for chills, fatigue and fever.   Respiratory: Negative.     Cardiovascular: Negative.    Gastrointestinal: Negative.  Negative for abdominal distention, abdominal pain, diarrhea, nausea and vomiting.   Genitourinary:  Positive for difficulty urinating, dysuria, frequency, hematuria and pelvic pain. Negative for flank pain and urgency.   Musculoskeletal: Negative.    Hematological: Negative.            Objective       ED Triage Vitals [09/29/24 1115]   Temperature Pulse Blood Pressure Respirations SpO2 Patient Position - Orthostatic VS   97.6 °F (36.4 °C) (!) 115 (!) 180/88 16 97 % Sitting      Temp Source Heart  Rate Source BP Location FiO2 (%) Pain Score    Temporal Monitor Right arm -- No Pain      Vitals      Date and Time Temp Pulse SpO2 Resp BP Pain Score FACES Pain Rating User   09/29/24 1429 -- 117 95 % 16 142/67 -- -- [facility]   09/29/24 1300 -- 116 98 % 16 157/84 -- -- [facility]   09/29/24 1254 -- 117 97 % 16 138/93 -- -- [facility]   09/29/24 1157 -- 120 97 % 16 134/77 -- -- [facility]   09/29/24 1115 97.6 °F (36.4 °C) 115 97 % 16 180/88 No Pain -- [facility]            Physical Exam  Vitals and nursing note reviewed.   Constitutional:       General: She is awake. She is not in acute distress.     Appearance: Normal appearance. She is well-developed.   HENT:      Head: Normocephalic and atraumatic.      Right Ear: Hearing and external ear normal.      Left Ear: Hearing and external ear normal.   Neck:      Trachea: Trachea and phonation normal.   Cardiovascular:      Rate and Rhythm: Regular rhythm. Tachycardia present.      Pulses:           Radial pulses are 2+ on the right side and 2+ on the left side.        Dorsalis pedis pulses are 2+ on the right side and 2+ on the left side.        Posterior tibial pulses are 2+ on the right side and 2+ on the left side.      Heart sounds: Normal heart sounds, S1 normal and S2 normal. No murmur heard.     No friction rub. No gallop.   Pulmonary:      Effort: Pulmonary effort is normal. No respiratory distress.      Breath sounds: Normal breath sounds. No stridor. No decreased breath sounds, wheezing, rhonchi or rales.   Abdominal:      General: There is no distension.      Palpations: There is no mass.      Tenderness: There is no abdominal tenderness. There is no right CVA tenderness, left CVA tenderness, guarding or rebound.   Skin:     General: Skin is warm and dry.   Neurological:      Mental Status: She is alert and oriented to person, place, and time.      GCS: GCS eye subscore is 4. GCS verbal subscore is 5. GCS motor subscore is 6.      Cranial Nerves: No cranial nerve deficit.      Sensory: No  sensory deficit.      Motor: No abnormal muscle tone.      Comments: PERRLA; EOMI. Sensation intact to light touch over face in V1-V3 distribution bilaterally. Facial expressions symmetric. Tongue/uvula midline. Shoulder shrug equal bilaterally. Strength 5/5 in UE/LE bilaterally. Sensation intact to light touch in UE/LE bilaterally.         Results Reviewed       Procedure Component Value Units Date/Time    Lactic acid 2 Hours [[id number]]  (Normal) Collected: 09/29/24 1408    Lab Status: Final result Specimen: Blood from Arm, Right Updated: 09/29/24 1433     LACTIC ACID 1.7 mmol/L     Narrative:      Result may be elevated if tourniquet was used during collection.    Protime-INR [[id number]]  (Normal) Collected: 09/29/24 1334    Lab Status: Final result Specimen: Blood from Arm, Right Updated: 09/29/24 1353     Protime 13.2 seconds      INR 0.95    Narrative:      INR Therapeutic Range    Indication                                             INR Range      Atrial Fibrillation                                               2.0-3.0  Hypercoagulable State                                    2.0.2.3  Left Ventricular Asist Device                            2.0-3.0  Mechanical Heart Valve                                  -    Aortic(with afib, MI, embolism, HF, LA enlargement,    and/or coagulopathy)                                     2.0-3.0 (2.5-3.5)     Mitral                                                             2.5-3.5  Prosthetic/Bioprosthetic Heart Valve               2.0-3.0  Venous thromboembolism (VTE: VT, PE        2.0-3.0    Procalcitonin [[id number]]  (Normal) Collected: 09/29/24 1136    Lab Status: Final result Specimen: Blood Updated: 09/29/24 1349     Procalcitonin 0.11 ng/ml     Blood culture #2 [[id number]] Collected: 09/29/24 1334    Lab Status: In process Specimen: Blood from Hand, Right Updated: 09/29/24 1340    Blood culture #1 [[id number]] Collected: 09/29/24 1334    Lab Status: In process  Specimen: Blood from Arm, Right Updated: 09/29/24 1340    Hepatic function panel [[id number]]  (Normal) Collected: 09/29/24 1136    Lab Status: Final result Specimen: Blood Updated: 09/29/24 1321     Total Bilirubin 0.65 mg/dL      Bilirubin, Direct 0.12 mg/dL      Alkaline Phosphatase 65 U/L      AST 19 U/L      ALT 33 U/L      Total Protein 7.5 g/dL      Albumin 4.5 g/dL     Lipase [[id number]]  (Normal) Collected: 09/29/24 1136    Lab Status: Final result Specimen: Blood Updated: 09/29/24 1321     Lipase 52 u/L     Blood gas, venous [[id number]]  (Abnormal) Collected: 09/29/24 1235    Lab Status: Final result Specimen: Blood from Arm, Left Updated: 09/29/24 1248     pH, Frandy 7.488     pCO2, Frandy 29.6 mm Hg      pO2, Frandy 53.2 mm Hg      HCO3, Frandy 21.9 mmol/L      Base Excess, Frandy -0.1 mmol/L      O2 Content, Frandy 19.5 ml/dL      O2 HGB, VENOUS 88.2 %     Beta Hydroxybutyrate [[id number]]  (Abnormal) Collected: 09/29/24 1136    Lab Status: Final result Specimen: Blood Updated: 09/29/24 1243     Beta- Hydroxybutyrate 0.59 mmol/L     Basic metabolic panel [[id number]]  (Abnormal) Collected: 09/29/24 1150    Lab Status: Final result Specimen: Blood from Arm, Left Updated: 09/29/24 1210     Sodium 135 mmol/L      Potassium 3.5 mmol/L      Chloride 95 mmol/L      CO2 25 mmol/L      ANION GAP 15 mmol/L      BUN 19 mg/dL      Creatinine 0.84 mg/dL      Glucose 331 mg/dL      Calcium 10.1 mg/dL      eGFR 75 ml/min/1.73sq m     Narrative:      National Kid[employer] Disease Foundation guidelines for Chronic Kidney Disease (CKD):     Stage 1 with normal or high GFR (GFR > 90 mL/min/1.73 square meters)    Stage 2 Mild CKD (GFR = 60-89 mL/min/1.73 square meters)    Stage 3A Moderate CKD (GFR = 45-59 mL/min/1.73 square meters)    Stage 3B Moderate CKD (GFR = 30-44 mL/min/1.73 square meters)    Stage 4 Severe CKD (GFR = 15-29 mL/min/1.73 square meters)    Stage 5 End Stage CKD (GFR <15 mL/min/1.73 square meters)  Note: GFR calculation is  accurate only with a steady state creatinine    Magnesium [[id number]]  (Abnormal) Collected: 09/29/24 1150    Lab Status: Final result Specimen: Blood from Arm, Left Updated: 09/29/24 1210     Magnesium 1.5 mg/dL     CK [[id number]]  (Normal) Collected: 09/29/24 1150    Lab Status: Final result Specimen: Blood from Arm, Left Updated: 09/29/24 1210     Total CK 53 U/L     Lactic acid, plasma (w/reflex if result > 2.0) [[id number]]  (Abnormal) Collected: 09/29/24 1150    Lab Status: Final result Specimen: Blood from Arm, Left Updated: 09/29/24 1209     LACTIC ACID 3.0 mmol/L     Narrative:      Result may be elevated if tourniquet was used during collection.    CBC and differential [[id number]]  (Abnormal) Collected: 09/29/24 1150    Lab Status: Final result Specimen: Blood from Arm, Left Updated: 09/29/24 1156     WBC 10.55 Thousand/uL      RBC 4.89 Million/uL      Hemoglobin 15.7 g/dL      Hematocrit 46.2 %      MCV 95 fL      MCH 32.1 pg      MCHC 34.0 g/dL      RDW 11.8 %      MPV 10.4 fL      Platelets 319 Thousands/uL      nRBC 0 /100 WBCs      Segmented % 63 %      Immature Grans % 0 %      Lymphocytes % 22 %      Monocytes % 13 %      Eosinophils Relative 1 %      Basophils Relative 1 %      Absolute Neutrophils 6.62 Thousands/µL      Absolute Immature Grans 0.04 Thousand/uL      Absolute Lymphocytes 2.31 Thousands/µL      Absolute Monocytes 1.40 Thousand/µL      Eosinophils Absolute 0.11 Thousand/µL      Basophils Absolute 0.07 Thousands/µL     Urine Microscopic [[id number]]  (Abnormal) Collected: 09/29/24 1136    Lab Status: Final result Specimen: Urine, Clean Catch Updated: 09/29/24 1151     RBC, UA       Field obscured, unable to enumerate     /hpf     WBC, UA Innumerable /hpf      Epithelial Cells       Field obscured, unable to enumerate     /hpf     Bacteria, UA       Field obscured, unable to enumerate     /hpf    Urine culture [[id number]] Collected: 09/29/24 1136    Lab Status: In process Specimen: Urine,  Clean Catch Updated: 24 1151    UA w Reflex to Microscopic w Reflex to Culture [[id number]]  (Abnormal) Collected: 24 1136    Lab Status: Final result Specimen: Urine, Clean Catch Updated: 24 1142     Color, UA Colorless     Clarity, UA Slightly Cloudy     Specific Gravity, UA <1.005     pH, UA 5.5     Leukocytes, UA Large     Nitrite, UA Negative     Protein, UA Trace mg/dl      Glucose, UA 1000 (1%) mg/dl      Ketones, UA Trace mg/dl      Urobilinogen, UA <2.0 mg/dl      Bilirubin, UA Negative     Occult Blood, UA Large            CT abdomen pelvis with contrast   Final Interpretation by Kameron Jesus MD ( 1410)   1. No acute inflammatory or infectious process.   2. Diverticulosis coli without diverticulitis.         Workstation performed: [id number]             Procedures    ED Medication and Procedure Management   Prior to Admission Medications   Prescriptions Last Dose Informant Patient Reported? Taking?   Aspirin Low Dose 81 MG EC tablet 2024 Self No Yes   Sig: TAKE ONE TABLET BY MOUTH EVERY DAY   amLODIPine (NORVASC) 5 mg tablet 2024 Self No Yes   Sig: Take 1 tablet (5 mg total) by mouth daily   dicyclomine (BENTYL) 20 mg tablet 2024 Self No Yes   Sig: Take 1 tablet (20 mg total) by mouth 2 (two) times a day   dulaglutide (Trulicity) 1.5 MG/0.5ML injection Past Week Self No Yes   Sig: Inject 0.5 mL (1.5 mg total) under the skin every 7 days   escitalopram (LEXAPRO) 5 mg tablet 2024 Self No Yes   Sig: TAKE ONE TABLET BY MOUTH EVERY DAY   famotidine (PEPCID) 40 MG tablet 2024  No Yes   Sig: Take 1 tablet (40 mg total) by mouth 2 (two) times a day   fluticasone (FLONASE) 50 mcg/act nasal spray 2024  No Yes   Si sprays into each nostril daily   folic acid (FOLVITE) 800 MCG tablet 2024  Yes Yes   Sig: Take 400 mcg by mouth daily   glucose blood (Accu-Chek Guide) test strip  Self No No   Sig: Use as instructed   ipratropium (ATROVENT) 0.03 % nasal spray    No No   Si sprays into each nostril 3 (three) times a day   metFORMIN (GLUCOPHAGE) 1000 MG tablet 2024 Self No Yes   Sig: Take 1 tablet (1,000 mg total) by mouth 2 (two) times a day with meals   metoprolol succinate (TOPROL-XL) 25 mg 24 hr tablet 2024 Self No Yes   Sig: Take 1 tablet (25 mg total) by mouth daily   valsartan-hydrochlorothiazide (DIOVAN-HCT) 160-12.5 MG per tablet 2024  Yes Yes   Sig: Take 1 tablet by mouth daily      Facility-Administered Medications: None     Patient's Medications   Discharge Prescriptions    No medications on file     No discharge procedures on file.  ED SEPSIS DOCUMENTATION   Time reflects when diagnosis was documented in both MDM as applicable and the Disposition within this note       Time User Action Codes Description Comment    2024  1:02 PM Ritz, Virgil Wilton Add [N12] Pyelonephritis     2024  1:02 PM Ritz, Virgil Wilton Add [E83.42] Hypomagnesemia     2024  1:02 PM Ritz, Virgil Wilton Add [R79.89] Elevated lactic acid level     2024  1:02 PM Ritz, Virgil Wilton Add [R78.89] Elevated beta-hydroxybutyrate     2024  1:02 PM Ritz, Virgil Wilton Add [E11.65] Hyperglycemia due to type 2 diabetes mellitus (HCC)     2024  1:02 PM Ritz, Virgil Wilton Add [A41.9] Sepsis (HCC)                  Virgil Flowers, DO  24 1541

## 2024-09-29 NOTE — ASSESSMENT & PLAN NOTE
Chronic and well controlled per patient   Continue home medication amlodipine   Withhold Valsartan/HCTZ till dehydration subsides

## 2024-09-30 PROBLEM — R78.81 BACTEREMIA: Status: ACTIVE | Noted: 2024-09-30

## 2024-09-30 PROBLEM — B96.20 BACTEREMIA DUE TO ESCHERICHIA COLI: Status: ACTIVE | Noted: 2024-09-30

## 2024-09-30 LAB
ALBUMIN SERPL BCG-MCNC: 3.9 G/DL (ref 3.5–5)
ALP SERPL-CCNC: 53 U/L (ref 34–104)
ALT SERPL W P-5'-P-CCNC: 25 U/L (ref 7–52)
ANION GAP SERPL CALCULATED.3IONS-SCNC: 9 MMOL/L (ref 4–13)
AST SERPL W P-5'-P-CCNC: 16 U/L (ref 13–39)
BASOPHILS # BLD AUTO: 0.03 THOUSANDS/ÂΜL (ref 0–0.1)
BASOPHILS NFR BLD AUTO: 0 % (ref 0–1)
BILIRUB SERPL-MCNC: 0.73 MG/DL (ref 0.2–1)
BUN SERPL-MCNC: 11 MG/DL (ref 5–25)
CALCIUM SERPL-MCNC: 8.1 MG/DL (ref 8.4–10.2)
CHLORIDE SERPL-SCNC: 101 MMOL/L (ref 96–108)
CHOLEST SERPL-MCNC: 95 MG/DL
CO2 SERPL-SCNC: 26 MMOL/L (ref 21–32)
CREAT SERPL-MCNC: 0.72 MG/DL (ref 0.6–1.3)
CREAT UR-MCNC: 31 MG/DL
CRP SERPL QL: 38.4 MG/L
EOSINOPHIL # BLD AUTO: 0.13 THOUSAND/ÂΜL (ref 0–0.61)
EOSINOPHIL NFR BLD AUTO: 2 % (ref 0–6)
ERYTHROCYTE [DISTWIDTH] IN BLOOD BY AUTOMATED COUNT: 12 % (ref 11.6–15.1)
GFR SERPL CREATININE-BSD FRML MDRD: 91 ML/MIN/1.73SQ M
GLUCOSE SERPL-MCNC: 190 MG/DL (ref 65–140)
GLUCOSE SERPL-MCNC: 199 MG/DL (ref 65–140)
GLUCOSE SERPL-MCNC: 244 MG/DL (ref 65–140)
GLUCOSE SERPL-MCNC: 245 MG/DL (ref 65–140)
GLUCOSE SERPL-MCNC: 266 MG/DL (ref 65–140)
HCT VFR BLD AUTO: 40 % (ref 34.8–46.1)
HDLC SERPL-MCNC: 28 MG/DL
HGB BLD-MCNC: 13.4 G/DL (ref 11.5–15.4)
IMM GRANULOCYTES # BLD AUTO: 0.03 THOUSAND/UL (ref 0–0.2)
IMM GRANULOCYTES NFR BLD AUTO: 0 % (ref 0–2)
LACTATE SERPL-SCNC: 1 MMOL/L (ref 0.5–2)
LDLC SERPL CALC-MCNC: 36 MG/DL (ref 0–100)
LYMPHOCYTES # BLD AUTO: 1.44 THOUSANDS/ÂΜL (ref 0.6–4.47)
LYMPHOCYTES NFR BLD AUTO: 21 % (ref 14–44)
MAGNESIUM SERPL-MCNC: 2.1 MG/DL (ref 1.9–2.7)
MCH RBC QN AUTO: 31.8 PG (ref 26.8–34.3)
MCHC RBC AUTO-ENTMCNC: 33.5 G/DL (ref 31.4–37.4)
MCV RBC AUTO: 95 FL (ref 82–98)
MICROALBUMIN UR-MCNC: 80.2 MG/L
MICROALBUMIN/CREAT 24H UR: 259 MG/G CREATININE (ref 0–30)
MONOCYTES # BLD AUTO: 0.85 THOUSAND/ÂΜL (ref 0.17–1.22)
MONOCYTES NFR BLD AUTO: 12 % (ref 4–12)
NEUTROPHILS # BLD AUTO: 4.48 THOUSANDS/ÂΜL (ref 1.85–7.62)
NEUTS SEG NFR BLD AUTO: 65 % (ref 43–75)
NONHDLC SERPL-MCNC: 67 MG/DL
NRBC BLD AUTO-RTO: 0 /100 WBCS
PHOSPHATE SERPL-MCNC: 2.6 MG/DL (ref 2.3–4.1)
PLATELET # BLD AUTO: 276 THOUSANDS/UL (ref 149–390)
PMV BLD AUTO: 10.2 FL (ref 8.9–12.7)
POTASSIUM SERPL-SCNC: 3.5 MMOL/L (ref 3.5–5.3)
PROCALCITONIN SERPL-MCNC: 0.26 NG/ML
PROT SERPL-MCNC: 6.5 G/DL (ref 6.4–8.4)
RBC # BLD AUTO: 4.21 MILLION/UL (ref 3.81–5.12)
SODIUM SERPL-SCNC: 136 MMOL/L (ref 135–147)
TRIGL SERPL-MCNC: 157 MG/DL
WBC # BLD AUTO: 6.96 THOUSAND/UL (ref 4.31–10.16)

## 2024-09-30 PROCEDURE — 84145 PROCALCITONIN (PCT): CPT

## 2024-09-30 PROCEDURE — 80061 LIPID PANEL: CPT

## 2024-09-30 PROCEDURE — 84100 ASSAY OF PHOSPHORUS: CPT

## 2024-09-30 PROCEDURE — 85025 COMPLETE CBC W/AUTO DIFF WBC: CPT

## 2024-09-30 PROCEDURE — 80053 COMPREHEN METABOLIC PANEL: CPT

## 2024-09-30 PROCEDURE — 82948 REAGENT STRIP/BLOOD GLUCOSE: CPT

## 2024-09-30 PROCEDURE — 83605 ASSAY OF LACTIC ACID: CPT

## 2024-09-30 PROCEDURE — 99232 SBSQ HOSP IP/OBS MODERATE 35: CPT | Performed by: INTERNAL MEDICINE

## 2024-09-30 PROCEDURE — 99223 1ST HOSP IP/OBS HIGH 75: CPT | Performed by: PHYSICIAN ASSISTANT

## 2024-09-30 PROCEDURE — 83735 ASSAY OF MAGNESIUM: CPT

## 2024-09-30 PROCEDURE — 86140 C-REACTIVE PROTEIN: CPT

## 2024-09-30 RX ORDER — CEFTRIAXONE 1 G/50ML
1000 INJECTION, SOLUTION INTRAVENOUS ONCE
Status: COMPLETED | OUTPATIENT
Start: 2024-09-30 | End: 2024-09-30

## 2024-09-30 RX ORDER — CEFTRIAXONE 2 G/50ML
2000 INJECTION, SOLUTION INTRAVENOUS EVERY 24 HOURS
Status: DISCONTINUED | OUTPATIENT
Start: 2024-10-01 | End: 2024-09-30

## 2024-09-30 RX ORDER — INSULIN GLARGINE 100 [IU]/ML
10 INJECTION, SOLUTION SUBCUTANEOUS EVERY MORNING
Status: DISCONTINUED | OUTPATIENT
Start: 2024-09-30 | End: 2024-10-01

## 2024-09-30 RX ORDER — INSULIN LISPRO 100 [IU]/ML
1-6 INJECTION, SOLUTION INTRAVENOUS; SUBCUTANEOUS
Status: DISCONTINUED | OUTPATIENT
Start: 2024-09-30 | End: 2024-10-02 | Stop reason: HOSPADM

## 2024-09-30 RX ORDER — INSULIN LISPRO 100 [IU]/ML
1-5 INJECTION, SOLUTION INTRAVENOUS; SUBCUTANEOUS
Status: DISCONTINUED | OUTPATIENT
Start: 2024-09-30 | End: 2024-10-02 | Stop reason: HOSPADM

## 2024-09-30 RX ADMIN — IPRATROPIUM BROMIDE 2 SPRAY: 42 SPRAY, METERED NASAL at 16:23

## 2024-09-30 RX ADMIN — ESCITALOPRAM OXALATE 5 MG: 10 TABLET ORAL at 08:21

## 2024-09-30 RX ADMIN — FAMOTIDINE 40 MG: 20 TABLET, FILM COATED ORAL at 18:13

## 2024-09-30 RX ADMIN — INSULIN GLARGINE 10 UNITS: 100 INJECTION, SOLUTION SUBCUTANEOUS at 10:41

## 2024-09-30 RX ADMIN — DICYCLOMINE HYDROCHLORIDE 20 MG: 20 TABLET ORAL at 18:13

## 2024-09-30 RX ADMIN — ERTAPENEM SODIUM 1000 MG: 1 INJECTION, POWDER, LYOPHILIZED, FOR SOLUTION INTRAMUSCULAR; INTRAVENOUS at 13:26

## 2024-09-30 RX ADMIN — IPRATROPIUM BROMIDE 2 SPRAY: 42 SPRAY, METERED NASAL at 21:16

## 2024-09-30 RX ADMIN — CEFTRIAXONE 1000 MG: 1 INJECTION, SOLUTION INTRAVENOUS at 05:40

## 2024-09-30 RX ADMIN — ASPIRIN 81 MG 81 MG: 81 TABLET ORAL at 08:21

## 2024-09-30 RX ADMIN — FAMOTIDINE 40 MG: 20 TABLET, FILM COATED ORAL at 08:21

## 2024-09-30 RX ADMIN — INSULIN LISPRO 3 UNITS: 100 INJECTION, SOLUTION INTRAVENOUS; SUBCUTANEOUS at 07:50

## 2024-09-30 RX ADMIN — INSULIN LISPRO 3 UNITS: 100 INJECTION, SOLUTION INTRAVENOUS; SUBCUTANEOUS at 09:47

## 2024-09-30 RX ADMIN — ENOXAPARIN SODIUM 40 MG: 40 INJECTION SUBCUTANEOUS at 18:13

## 2024-09-30 RX ADMIN — AMLODIPINE BESYLATE 5 MG: 5 TABLET ORAL at 08:21

## 2024-09-30 RX ADMIN — CEFTRIAXONE 1000 MG: 1 INJECTION, SOLUTION INTRAVENOUS at 09:47

## 2024-09-30 RX ADMIN — FOLIC ACID TAB 400 MCG 400 MCG: 400 TAB at 08:21

## 2024-09-30 RX ADMIN — FLUTICASONE PROPIONATE 2 SPRAY: 50 SPRAY, METERED NASAL at 08:22

## 2024-09-30 RX ADMIN — INSULIN LISPRO 1 UNITS: 100 INJECTION, SOLUTION INTRAVENOUS; SUBCUTANEOUS at 21:15

## 2024-09-30 RX ADMIN — DICYCLOMINE HYDROCHLORIDE 20 MG: 20 TABLET ORAL at 08:21

## 2024-09-30 RX ADMIN — SODIUM CHLORIDE 100 ML/HR: 0.9 INJECTION, SOLUTION INTRAVENOUS at 02:56

## 2024-09-30 RX ADMIN — METOPROLOL SUCCINATE 25 MG: 25 TABLET, EXTENDED RELEASE ORAL at 08:21

## 2024-09-30 RX ADMIN — IPRATROPIUM BROMIDE 2 SPRAY: 42 SPRAY, METERED NASAL at 08:22

## 2024-09-30 RX ADMIN — INSULIN LISPRO 2 UNITS: 100 INJECTION, SOLUTION INTRAVENOUS; SUBCUTANEOUS at 16:23

## 2024-09-30 NOTE — PROGRESS NOTES
Progress Note - Hospitalist   Name: Korin Wilhelm 59 y.o. female I MRN: [id number]  Unit/Bed#: 421-01 I Date of Admission: 9/29/2024   Date of Service: 9/30/2024 I Hospital Day: 1     Assessment & Plan  Sepsis secondary to UTI  (HCC)  Patient currently managed for urosepsis, after presenting with 15 days intermittent history of burning micturition, frequency,hesitancy and suprapubic heaviness while on cruise trip to [place]. Initially managed with 1 week course of ciprofloxacin but symptoms returned again yesterday along with hematuria which prompted a visit to the ED. Patient denies any fever, vomiting or flank pain   Examination initially revealed dry coated tongue, mild tenderness in hypogastric region on deep palpation, no rebound, no guarding no CVA tenderness.  CBC; wbc 10.55 with predominant neutrophils, CMP normal, Mg 1.5  Blood culture significant for Ecoli bacteremia   Urine RE : positive leucocytes, RBCs and LA, urine culture awaited  Initial lactic acid 3, then 1.7(N)---- likely due to metformin   Pro Ji : normal initially now 0.26, CRP 38.4, Ca 8.1  CT scan : IMPRESSION: 1. No acute inflammatory or infectious process. 2. Diverticulosis coli without diverticulitis.    Plan:  Continue Ceftriaxone 1000 mg once a day till culture results   IVF NS @ 100ml hr  Encourage fluids per oral   Acetaminophen for pain/ fever PRN    Ondansetron for nausea/Vomiting PRN    F/U Urine culture, Trend Q-CRP daily   F/U ID reccs      Type 2 diabetes mellitus without complication, without long-term current use of insulin ([facility])  Lab Results   Component Value Date    HGBA1C 11.5 (H) 09/29/2024     Known Diabetic since last 10 years, poorly controlled HbA1c 11.5   Managed with Metformin 1000 mg BID and Trulicity once a week at home   At time of admission , VBG PH: 7.488, Anion gap 15, bicarbonate 22, urine ketones positive (likely dehydration vs mild DKA in context of underlying UTI)  Diabetic education per  nutritionist as patient non compliant diet   Insulin lispro sliding scale 3 times a day,  Lantus @ 10 units   Lipid panel abnormal, high dose statin started    F/U albumin to creatinine ratio        Blood Sugar Average: Last 72 hrs:  (P) 260.6003616056120145  Primary hypertension  Chronic and well controlled per patient   Continue home medication amlodipine   Withhold Valsartan/HCTZ till dehydration subsides     24 Hour Events   Subjective : Patient remained vitally stable and afebrile. Symptoms continue to improve. No overnight events recorded    Objective      Temp:  [97.6 °F (36.4 °C)-98.8 °F (37.1 °C)] 98.1 °F (36.7 °C)  HR:  [] 94  Resp:  [15-17] 15  BP: (134-180)/(67-93) 141/93  O2 Device: None (Room air)          I/O last 24 hours:  In: 2430 [P.O.:180; I.V.:2200; IV Piggyback:50]  Out: 2000 [Urine:2000]    Physical Exam  Constitutional:       Appearance: She is obese.   HENT:      Head: Normocephalic and atraumatic.      Mouth/Throat:      Mouth: Mucous membranes are moist.      Pharynx: Oropharynx is clear.   Eyes:      Conjunctiva/sclera: Conjunctivae normal.   Cardiovascular:      Rate and Rhythm: Normal rate and regular rhythm.      Pulses: Normal pulses.      Heart sounds: Normal heart sounds.   Pulmonary:      Effort: Pulmonary effort is normal.      Breath sounds: Normal breath sounds.   Abdominal:      General: Bowel sounds are normal.   Musculoskeletal:         General: Normal range of motion.      Cervical back: Neck supple.   Skin:     General: Skin is warm and dry.      Capillary Refill: Capillary refill takes less than 2 seconds.   Neurological:      General: No focal deficit present.      Mental Status: She is alert and oriented to person, place, and time.   Psychiatric:         Mood and Affect: Mood normal.         Behavior: Behavior normal.         Thought Content: Thought content normal.         Judgment: Judgment normal.          Lab Results: I have reviewed the following results:    Imaging Review: Reviewed radiology reports from this admission including: CT abdomen/pelvis.  Other Studies: No additional pertinent studies reviewed.    VTE Pharmacologic Prophylaxis: VTE covered by:  enoxaparin, Subcutaneous, 40 mg at 09/29/24 4163     VTE Mechanical Prophylaxis: reason for no mechanical VTE prophylaxis not required     Administrative Statements   I have spent a total time of 60 minutes in caring for this patient on the day of the visit/encounter including Diagnostic results, Risks and benefits of tx options, Instructions for management, Counseling / Coordination of care, Documenting in the medical record, Reviewing / ordering tests, medicine, procedures  , and Communicating with other healthcare professionals .

## 2024-09-30 NOTE — CASE MANAGEMENT
Case Management Progress Note    Patient name Korin Wilhelm  Location /421-01 MRN [id number]  : 1965 Date 2024       LOS (days): 1  Geometric Mean LOS (GMLOS) (days):   Days to GMLOS:        OBJECTIVE:        Current admission status: Inpatient  Preferred Pharmacy:   [id number] - [place] - [address]  [address]  [place] [address]  Phone: [phone] Fax: [phone]    Primary Care Provider: Abbie Campos PA-C    Primary Insurance: BLUE CROSS  Secondary Insurance:     PROGRESS NOTE:    Chart review completed.  No CM needs at this time.  CM to follow for any discharge needs.

## 2024-09-30 NOTE — PLAN OF CARE
Problem: PAIN - ADULT  Goal: Verbalizes/displays adequate comfort level or baseline comfort level  Description: Interventions:  - Encourage patient to monitor pain and request assistance  - Assess pain using appropriate pain scale  - Administer analgesics based on type and severity of pain and evaluate response  - Implement non-pharmacological measures as appropriate and evaluate response  - Consider cultural and social influences on pain and pain management  - Notify physician/advanced practitioner if interventions unsuccessful or patient reports new pain  Outcome: Progressing     Problem: INFECTION - ADULT  Goal: Absence or prevention of progression during hospitalization  Description: INTERVENTIONS:  - Assess and monitor for signs and symptoms of infection  - Monitor lab/diagnostic results  - Monitor all insertion sites, i.e. indwelling lines, tubes, and drains  - Monitor endotracheal if appropriate and nasal secretions for changes in amount and color  - [place] appropriate cooling/warming therapies per order  - Administer medications as ordered  - Instruct and encourage patient and family to use good hand hygiene technique  - Identify and instruct in appropriate isolation precautions for identified infection/condition  Outcome: Progressing     Problem: DISCHARGE PLANNING  Goal: Discharge to home or other facility with appropriate resources  Description: INTERVENTIONS:  - Identify barriers to discharge w/patient and caregiver  - Arrange for needed discharge resources and transportation as appropriate  - Identify discharge learning needs (meds, wound care, etc.)  - Arrange for interpretive services to assist at discharge as needed  - Refer to Case Management Department for coordinating discharge planning if the patient needs post-hospital services based on physician/advanced practitioner order or complex needs related to functional status, cognitive ability, or social support system  Outcome: Progressing      Problem: Knowledge Deficit  Goal: Patient/family/caregiver demonstrates understanding of disease process, treatment plan, medications, and discharge instructions  Description: Complete learning assessment and assess knowledge base.  Interventions:  - Provide teaching at level of understanding  - Provide teaching via preferred learning methods  Outcome: Progressing     Problem: GENITOURINARY - ADULT  Goal: Maintains or returns to baseline urinary function  Description: INTERVENTIONS:  - Assess urinary function  - Encourage oral fluids to ensure adequate hydration if ordered  - Administer IV fluids as ordered to ensure adequate hydration  - Administer ordered medications as needed  - Offer frequent toileting  - Follow urinary retention protocol if ordered  Outcome: Progressing     Problem: CARDIOVASCULAR - ADULT  Goal: Maintains optimal cardiac output and hemodynamic stability  Description: INTERVENTIONS:  - Monitor I/O, vital signs and rhythm  - Monitor for S/S and trends of decreased cardiac output  - Administer and titrate ordered vasoactive medications to optimize hemodynamic stability  - Assess quality of pulses, skin color and temperature  - Assess for signs of decreased coronary artery perfusion  - Instruct patient to report change in severity of symptoms  Outcome: Progressing     Problem: RESPIRATORY - ADULT  Goal: Achieves optimal ventilation and oxygenation  Description: INTERVENTIONS:  - Assess for changes in respiratory status  - Assess for changes in mentation and behavior  - Position to facilitate oxygenation and minimize respiratory effort  - Oxygen administered by appropriate delivery if ordered  - Initiate smoking cessation education as indicated  - Encourage broncho-pulmonary hygiene including cough, deep breathe, Incentive Spirometry  - Assess the need for suctioning and aspirate as needed  - Assess and instruct to report SOB or any respiratory difficulty  - Respiratory Therapy support as  indicated  Outcome: Progressing     Problem: METABOLIC, FLUID AND ELECTROLYTES - ADULT  Goal: Electrolytes maintained within normal limits  Description: INTERVENTIONS:  - Monitor labs and assess patient for signs and symptoms of electrolyte imbalances  - Administer electrolyte replacement as ordered  - Monitor response to electrolyte replacements, including repeat lab results as appropriate  - Instruct patient on fluid and nutrition as appropriate  Outcome: Progressing  Goal: Fluid balance maintained  Description: INTERVENTIONS:  - Monitor labs   - Monitor I/O and WT  - Instruct patient on fluid and nutrition as appropriate  - Assess for signs & symptoms of volume excess or deficit  Outcome: Progressing

## 2024-09-30 NOTE — ASSESSMENT & PLAN NOTE
E/b tachycardia and leukocytosis. Also with lactic acidosis. Reported 15d history of dysuria, frequency, and hematuria which she developed while she was on a cruise to [place]. Was treated with ciprofloxacin on the cruise by the on-board Physician. Reports compliance and initial improvement in hematuria, which then worsened prompting ED evaluation. CT A/P without any evidence of acute intra-abdominal abnormalities. UA obtained demonstrated innumerable WBC, large leukocytes, and large blood. Ucx pending. Course now further complicated by bacteremia as below.   Antibiotic as below  Continue to monitor temperature/vitals  Follow-up blood cultures  Continue to follow CBCD and CMP with AM labs to monitor for potential antimicrobial toxicities and assess for clinical response to antibiotics.

## 2024-09-30 NOTE — ASSESSMENT & PLAN NOTE
Patient currently managed for urosepsis, after presenting with 15 days intermittent history of burning micturition, frequency,hesitancy and suprapubic heaviness while on cruise trip to [place]. Initially managed with 1 week course of ciprofloxacin but symptoms returned again yesterday along with hematuria which prompted a visit to the ED. Patient denies any fever, vomiting or flank pain   Examination initially revealed dry coated tongue, mild tenderness in hypogastric region on deep palpation, no rebound, no guarding no CVA tenderness.  CBC; wbc 10.55 with predominant neutrophils, CMP normal, Mg 1.5  Blood culture significant for Ecoli bacteremia   Urine RE : positive leucocytes, RBCs and LA, urine culture awaited  Initial lactic acid 3, then 1.7(N)---- likely due to metformin   Pro Ji : normal initially now 0.26, CRP 38.4, Ca 8.1  CT scan : IMPRESSION: 1. No acute inflammatory or infectious process. 2. Diverticulosis coli without diverticulitis.    Plan:  Continue Ceftriaxone 1000 mg once a day till culture results   IVF NS @ 100ml hr  Encourage fluids per oral   Acetaminophen for pain/ fever PRN    Ondansetron for nausea/Vomiting PRN    F/U Urine culture, Trend Q-CRP daily   F/U ID reccs

## 2024-09-30 NOTE — CONSULTS
Consultation - Infectious Disease   Name: Korin Wilhelm 59 y.o. female I MRN: [id number]  Unit/Bed#: 421-01 I Date of Admission: 9/29/2024   Date of Service: 9/30/2024 I Hospital Day: 1     REQUIRED DOCUMENTATION:     1. This service was provided via Telemedicine.  2. Provider located at [facility].  3. TeleMed provider: Janeth Miranda PA-C  4. Identify all parties in room with patient during tele consult: Patient, PA-C  5. After connecting through [employer], patient was identified by name and date of birth and assistant checked wristband.  Patient was then informed that this was a Telemedicine visit and that the exam was being conducted confidentially over secure lines. My office door was closed. No one else was in the room.  Patient acknowledged consent and understanding of privacy and security of the Telemedicine visit, and gave us permission to have the assistant stay in the room in order to assist with the history and to conduct the exam.  I informed the patient that I have reviewed their record in Epic and presented the opportunity for them to ask any questions regarding the visit today.  The patient agreed to participate.     Inpatient consult to Infectious Diseases  Consult performed by: Janeth Miranda PA-C  Consult ordered by: Evert Thurman DO        Physician Requesting Evaluation: Evert Thurman DO   Reason for Evaluation / Principal Problem: 1. Sepsis 2. Bacteremia 3. UTI    Assessment & Plan  Sepsis secondary to UTI  (HCC)  E/b tachycardia and leukocytosis. Also with lactic acidosis. Reported 15d history of dysuria, frequency, and hematuria which she developed while she was on a cruise to [place]. Was treated with ciprofloxacin on the cruise by the on-board Physician. Reports compliance and initial improvement in hematuria, which then worsened prompting ED evaluation. CT A/P without any evidence of acute intra-abdominal abnormalities. UA obtained demonstrated innumerable  WBC, large leukocytes, and large blood. Ucx pending. Course now further complicated by bacteremia as below.   Antibiotic as below  Continue to monitor temperature/vitals  Follow-up blood cultures  Continue to follow CBCD and CMP with AM labs to monitor for potential antimicrobial toxicities and assess for clinical response to antibiotics.   Bacteremia due to Escherichia coli  1 out of 2 blood cultures on admission positive for E.coli. ESBL resistance gene detected. Most likely 2/2 to urosepsis as above. CT A/P did not show any evidence of intra-abdominal abnormality. Will switch from IV ceftriaxone to IV ertapenem given presence of ESBL resistance gene.   Stop IV ceftriaxone  Start IV ertapenem 1g daily  Follow-up further identification and sensitivities  Will narrow antibiotics as able based on culture data  Type 2 diabetes mellitus without complication, without long-term current use of insulin ([facility])  Lab Results   Component Value Date    HGBA1C 11.5 (H) 09/29/2024     Recent Labs     09/29/24  1620 09/29/24  2059 09/30/24  0727 09/30/24  0945   POCGLU 272* 264* 245* 266*     Blood Sugar Average: Last 72 hrs:  (P) 261.75  Uncontrolled. With elevated A1C to 11.5 and hyperglycemia. Significantly increases risk of development of infection and poor wound healing.  Recommend tight glycemic control  Management per primary team     Infectious Disease service will follow.  Above plan discussed with patient via telemedicine TV kit  Above plan discussed with primary team who agrees with switching antibiotics from IV ceftriaxone to IV ertapenem and following up culture data.     History of Present Illness   Korin Wilhelm is a 59 y.o. female with pmhx of seasonal allergies, HLD, HTN, T2DM c/b diabetic retinopathy, who presented to [facility] ED on 9/29 with a 15d history of dysuria and hematuria intermittently. Patient developed these symptoms while on a cruise in [place]. Initially it started with increased  frequency, suprapubic pressure, and hesitancy which progressed to hematuria. Patient was given a course of ciprofloxacin on the cruise and reported improvement in the hematuria. Patient again developed hematuria prompting ED evaluation. Patient denies fever or chills. Upon arrival to the ED, patient was afebrile but persistently tachycardic. Stable on room air. Labs demonstrated a leukocytosis to 10.55, hyperglycemia to 331, elevated lactic acid to 3.0. Procalcitonin was WNL at 0.11. CT A/P obtained demonstrated no acute inflammatory or infectious process. It showed diverticulosis without evidence of diverticulitis. No hydronephrosis or bladder wall thickening. A UA was obtained which demonstrated innumerable WBC, large blood, and large leukocytes. Urine culture is pending. Blood cultures obtained resulted positive 1 out of 2 cultures for gram negative rods identified as E.coli. BCID detected the CTX-M (ESBL resistance gene). ID is consulted for antibiotic management.     Per my discussion with the patient, she states that her symptoms started over 2 weeks ago when she went to [facility] [place] [facility]. States she and her  walked 25k steps that day and she only drank 1 bottle of water. She developed pressure and incomplete bladder emptying that night that worsened when she got on the cruise. The symptoms progressed to dysuria, hematuria, and frequency on the cruise. She took Ciprofloxacin prescribed by the on-board physician and the hematuria improved, but the other symptoms did not. She reports that she is feeling better now that she is in the hospital. She is urinating and emptying her bladder. Hematuria and dysuria improved. No fevers or chills. No nausea, vomiting, diarrhea, CP, SOB. Abdominal pressure nearly resolved. No back pain. Tolerating IV antibiotics. No rashes.     Review of Systems  I have reviewed the patient's PMH, PSH, Social History, Family History, Meds, and  Allergies    ANTIBIOTICS:  Ceftriaxone    Historical Information   Past Medical History:   Diagnosis Date    Diabetes mellitus (HCC)     Ear problems Year and a half    High cholesterol     Hypertension     Sinusitis      Past Surgical History:   Procedure Laterality Date    HYSTERECTOMY      TONSILLECTOMY  5 years opd     Social History     Tobacco Use    Smoking status: Former     Current packs/day: 0.00     Average packs/day: 1.5 packs/day for 25.7 years (38.5 ttl pk-yrs)     Types: Cigarettes     Start date: 1984     Quit date: 4/10/2010     Years since quittin.4     Passive exposure: Past    Smokeless tobacco: Never   Vaping Use    Vaping status: Never Used   Substance and Sexual Activity    Alcohol use: Never    Drug use: Never    Sexual activity: Yes     Partners: Male     Birth control/protection: None     E-Cigarette/Vaping    E-Cigarette Use Never User      E-Cigarette/Vaping Substances     Family History   Problem Relation Age of Onset    Diabetes Mother     Diabetes Father     Hypertension Father      Social History     Tobacco Use    Smoking status: Former     Current packs/day: 0.00     Average packs/day: 1.5 packs/day for 25.7 years (38.5 ttl pk-yrs)     Types: Cigarettes     Start date: 1984     Quit date: 4/10/2010     Years since quittin.4     Passive exposure: Past    Smokeless tobacco: Never   Vaping Use    Vaping status: Never Used   Substance and Sexual Activity    Alcohol use: Never    Drug use: Never    Sexual activity: Yes     Partners: Male     Birth control/protection: None     Prior to Admission Medications   Prescriptions Last Dose Informant Patient Reported? Taking?   Aspirin Low Dose 81 MG EC tablet 2024 Self No Yes   Sig: TAKE ONE TABLET BY MOUTH EVERY DAY   amLODIPine (NORVASC) 5 mg tablet 2024 Self No Yes   Sig: Take 1 tablet (5 mg total) by mouth daily   dicyclomine (BENTYL) 20 mg tablet 2024 Self No Yes   Sig: Take 1 tablet (20 mg total) by mouth  2 (two) times a day   dulaglutide (Trulicity) 1.5 MG/0.5ML injection Past Week Self No Yes   Sig: Inject 0.5 mL (1.5 mg total) under the skin every 7 days   escitalopram (LEXAPRO) 5 mg tablet 2024 Self No Yes   Sig: TAKE ONE TABLET BY MOUTH EVERY DAY   famotidine (PEPCID) 40 MG tablet 2024  No Yes   Sig: Take 1 tablet (40 mg total) by mouth 2 (two) times a day   fluticasone (FLONASE) 50 mcg/act nasal spray 2024  No Yes   Si sprays into each nostril daily   folic acid (FOLVITE) 800 MCG tablet 2024  Yes Yes   Sig: Take 400 mcg by mouth daily   glucose blood (Accu-Chek Guide) test strip  Self No No   Sig: Use as instructed   ipratropium (ATROVENT) 0.03 % nasal spray   No No   Si sprays into each nostril 3 (three) times a day   metFORMIN (GLUCOPHAGE) 1000 MG tablet 2024 Self No Yes   Sig: Take 1 tablet (1,000 mg total) by mouth 2 (two) times a day with meals   metoprolol succinate (TOPROL-XL) 25 mg 24 hr tablet 2024 Self No Yes   Sig: Take 1 tablet (25 mg total) by mouth daily   valsartan-hydrochlorothiazide (DIOVAN-HCT) 160-12.5 MG per tablet 2024  Yes Yes   Sig: Take 1 tablet by mouth daily      Facility-Administered Medications: None     Jardiance [empagliflozin] and Ozempic (0.25 or 0.5 mg-dose) [semaglutide(0.25 or 0.5mg-dos)]    Objective      Temp:  [97.6 °F (36.4 °C)-98.8 °F (37.1 °C)] 98.1 °F (36.7 °C)  HR:  [] 94  Resp:  [15-17] 15  BP: (134-180)/(67-93) 141/93  O2 Device: None (Room air)          I/O last 24 hours:  In: 2610 [P.O.:360; I.V.:2200; IV Piggyback:50]  Out:  [Urine:2000]  Lines/Drains/Airways       Active Status       None                  Physical Exam     Physical exam findings reported by bedside and primary medical team staff  General Appearance:  Appearing well, nontoxic, and in no distress. Sitting upright in chair next to bed.    Head:  Normocephalic, without obvious abnormality, atraumatic.   Eyes:  Conjunctiva pink and sclera anicteric,  both eyes.   Nose: Nares normal, mucosa normal, no drainage.   Throat: Oropharynx moist without lesions.   Neck: Supple, symmetrical, no adenopathy, no tenderness/mass/nodules.   Back:   Symmetric, ROM normal, no CVA tenderness.   Lungs:   Clear to auscultation bilaterally, respirations unlabored. On room air.    Chest Wall:  No tenderness or deformity.   Heart:  RRR; no murmur, rub or gallop.   Abdomen:   Soft, non-tender, non-distended, positive bowel sounds throughout.   Extremities: No cyanosis or clubbing, no edema.   Skin: No rashes or lesions. No draining wounds noted.   Neurologic: Alert and oriented times 3, follows commands, speech is organized and appropriate, symmetric facial movement.        Lab Results: I have reviewed the following results:   Recent Labs     09/29/24  1334 09/29/24  1408 09/30/24  0543   WBC  --   --  6.96   HGB  --   --  13.4   HCT  --   --  40.0   PLT  --    < > 276   SODIUM  --   --  136   K  --   --  3.5   CL  --   --  101   CO2  --   --  26   BUN  --   --  11   CREATININE  --   --  0.72   GLUC  --   --  244*   MG  --   --  2.1   PHOS  --   --  2.6   AST  --   --  16   ALT  --   --  25   ALB  --   --  3.9   TBILI  --   --  0.73   ALKPHOS  --   --  53   INR 0.95  --   --    LACTICACID  --    < > 1.0    < > = values in this interval not displayed.     Micro:  Lab Results   Component Value Date    BLOODCX Received in Microbiology Lab. Culture in Progress. 09/29/2024    URINECX 10,000-19,000 cfu/ml 07/20/2023    URINECX 20,000-29,000 cfu/ml 10/21/2022    URINECX 20,000-29,000 cfu/ml 09/16/2022     Imaging Review: Personally reviewed the following image studies in PACS and associated radiology reports: CT abdomen/pelvis. My interpretation of the radiology images/reports is: no acute inflammatory or infectious process. It showed diverticulosis without evidence of diverticulitis. No hydronephrosis or bladder wall thickening.  Other Studies: No additional pertinent studies  reviewed.      Janeth Miranda PA-C  [facility]

## 2024-09-30 NOTE — ASSESSMENT & PLAN NOTE
Lab Results   Component Value Date    HGBA1C 11.5 (H) 09/29/2024     Known Diabetic since last 10 years, poorly controlled HbA1c 11.5   Managed with Metformin 1000 mg BID and Trulicity once a week at home   At time of admission , VBG PH: 7.488, Anion gap 15, bicarbonate 22, urine ketones positive (likely dehydration vs mild DKA in context of underlying UTI)  Diabetic education per nutritionist as patient non compliant diet   Insulin lispro sliding scale 3 times a day,  Lantus @ 10 units   Lipid panel abnormal, high dose statin started    F/U albumin to creatinine ratio        Blood Sugar Average: Last 72 hrs:  (P) [id number]

## 2024-09-30 NOTE — UTILIZATION REVIEW
Initial Clinical Review    Admission: Date/Time/Statement:   Admission Orders (From admission, onward)       Ordered        24 1433  Inpatient Admission  Once                          Orders Placed This Encounter   Procedures    Inpatient Admission     Standing Status:   Standing     Number of Occurrences:   1     Order Specific Question:   Level of Care     Answer:   Med Surg [16]     Order Specific Question:   Estimated length of stay     Answer:   More than 2 Midnights     Order Specific Question:   Certification     Answer:   I certify that inpatient services are medically necessary for this patient for a duration of greater than two midnights. See H&P and MD Progress Notes for additional information about the patient's course of treatment.     ED Arrival Information       Expected   -    Arrival   2024 11:10    Acuity   Urgent              Means of arrival   Walk-In    Escorted by   Self    Service   Hospitalist    Admission type   Emergency              Arrival complaint   vaginal bleed, abdominal pressure             Chief Complaint   Patient presents with    Possible UTI     Patient presents with lower abdominal pressure and vaginal bleeding. States she recently had a UTI, finished abx yesterday but feels it returned       Initial Presentation: 59 y.o. female presents to ed from home on 24 for vaginal bleed, abdominal pressure.  Recently finished po antibiotic for UTI.  Symptoms developed on a cruise.  Started on po cipro.  The hematuria subsided but burning micturition persisted, she again developed hematuria yesterday prompting a visit to the ED. PMHX: DM, HTN.  Clinical assessment significant for tachycardia.    There is abdominal tenderness in the suprapubic area. There is no right CVA tenderness, left CVA tenderness, guarding or rebound. LA 3.0, MAG 1.5, VB.488 / 29.6 / 53.2 / 21.9, UA: large leukocytes, large occult blood.   Initially treated with iv ceftriaxone, iv multi  electrolyte 2.2 L bolus, iv Mag x1.   Admit to inpatient med surg for sepsis, UTI.      Anticipated Length of Stay/Certification Statement: Patient will be admitted on an inpatient basis with an anticipated length of stay of greater than 2 midnights secondary to urosepsis.     Date: 9-30-24    Day 2: inpatient med surg  PROCAL 0.26  Blood culture:  gram neg rods x1.  B-HB :  0.59.  ID consult completed: bacteremia due to escherichia coli.  Stop iv ceftriaxone. Start iv ertapenem 1 g daily.      Addendum:  diabetes with HBA1C 11.5. Poorly controlled.  On metformin and trulicity as outpatient.  At time of admission , VBG PH: 7.488, Anion gap 15, bicarbonate 22, urine ketones positive (likely dehydration vs mild DKA in context of underlying UTI)   patient received diabetic education as she is non compliant with diet.  Start sq insulin.  Check albumin and creatinine ratio. Consider statins if  lipid panel is abnormal.    Date: 10-1-24 inpatient med surg   K 3.4  Continue iv ertapenem and iv .9% ns 50/hr.  Plan to narrow antibiotic based on culture results    ED Treatment-Medication Administration from 09/29/2024 1110 to 09/29/2024 1445         Date/Time Order Dose Route Action     09/29/2024 1210 cefTRIAXone (ROCEPHIN) IVPB (premix in dextrose) 1,000 mg 50 mL 1,000 mg Intravenous New Bag     09/29/2024 1231 multi-electrolyte (ISOLYTE-S PH 7.4) bolus 2,200 mL 2,200 mL Intravenous New Bag     09/29/2024 1237 magnesium sulfate 2 g/50 mL IVPB (premix) 2 g 2 g Intravenous New Bag     09/29/2024 1410 magnesium sulfate 2 g/50 mL IVPB (premix) 2 g 2 g Intravenous New Bag            Scheduled Medications:    amLODIPine, 5 mg, Oral, Daily  aspirin, 81 mg, Oral, Daily  dicyclomine, 20 mg, Oral, BID  enoxaparin, 40 mg, Subcutaneous, Daily  ertapenem, 1,000 mg, Intravenous, Q24H  escitalopram, 5 mg, Oral, Daily  famotidine, 40 mg, Oral, BID  fluticasone, 2 spray, Nasal, Daily  folic acid, 400 mcg, Oral, Daily  insulin  glargine, 10 Units, Subcutaneous, QAM  insulin lispro, 1-5 Units, Subcutaneous, HS  insulin lispro, 1-6 Units, Subcutaneous, TID AC  ipratropium, 2 spray, Each Nare, TID  metoprolol succinate, 25 mg, Oral, Daily      Continuous IV Infusions:  sodium chloride, 50 mL/hr, Intravenous, Continuous      PRN Meds:  acetaminophen, 650 mg, Oral, Q6H PRN  ondansetron, 4 mg, Intravenous, Q6H PRN      ED Triage Vitals [09/29/24 1115]   Temperature Pulse Respirations Blood Pressure SpO2 Pain Score   97.6 °F (36.4 °C) (!) 115 16 (!) 180/88 97 % No Pain     Weight (last 2 days)       Date/Time Weight    09/29/24 14:50:47 97.9 (215.83)            Vital Signs (last 3 days)       Date/Time Temp Pulse Resp BP MAP (mmHg) SpO2 O2 Device Pain    09/30/24 0900 -- -- -- -- -- -- -- No Pain    09/30/24 07:27:49 98.1 °F (36.7 °C) 94 15 141/93 109 98 % -- --    09/29/24 2100 -- -- -- -- -- -- None (Room air) No Pain    09/29/24 1541 -- -- -- -- -- -- None (Room air) No Pain    09/29/24 14:50:47 98.8 °F (37.1 °C) 119 17 144/83 103 98 % -- --    09/29/24 1429 -- 117 16 142/67 95 95 % None (Room air) --    09/29/24 1300 -- 116 16 157/84 113 98 % None (Room air) --    09/29/24 1254 -- 117 16 138/93 109 97 % None (Room air) --    09/29/24 1157 -- 120 16 134/77 98 97 % None (Room air) --    09/29/24 1115 97.6 °F (36.4 °C) 115 16 180/88 126 97 % None (Room air) No Pain              Pertinent Labs/Diagnostic Test Results:   Radiology:  CT abdomen pelvis with contrast   Final(09/29 1410)   1. No acute inflammatory or infectious process.   2. Diverticulosis coli without diverticulitis.        Cardiology:  No orders to display     GI:  No orders to display           Results from last 7 days   Lab Units 09/30/24  0543 09/29/24  1555 09/29/24  1150   WBC Thousand/uL 6.96  --  10.55*   HEMOGLOBIN g/dL 13.4  --  15.7*   HEMATOCRIT % 40.0  --  46.2*   PLATELETS Thousands/uL 276 296 319   TOTAL NEUT ABS Thousands/µL 4.48  --  6.62         Results from last 7  days   Lab Units 09/30/24  0543 09/29/24  1150   SODIUM mmol/L 136 135   POTASSIUM mmol/L 3.5 3.5   CHLORIDE mmol/L 101 95*   CO2 mmol/L 26 25   ANION GAP mmol/L 9 15*   BUN mg/dL 11 19   CREATININE mg/dL 0.72 0.84   EGFR ml/min/1.73sq m 91 75   CALCIUM mg/dL 8.1* 10.1   MAGNESIUM mg/dL 2.1 1.5*   PHOSPHORUS mg/dL 2.6  --      Results from last 7 days   Lab Units 09/30/24  0543 09/29/24  1136   AST U/L 16 19   ALT U/L 25 33   ALK PHOS U/L 53 65   TOTAL PROTEIN g/dL 6.5 7.5   ALBUMIN g/dL 3.9 4.5   TOTAL BILIRUBIN mg/dL 0.73 0.65   BILIRUBIN DIRECT mg/dL  --  0.12     Results from last 7 days   Lab Units 09/30/24  0945 09/30/24  0727 09/29/24 2059 09/29/24  1620   POC GLUCOSE mg/dl 266* 245* 264* 272*     Results from last 7 days   Lab Units 09/30/24  0543 09/29/24  1150   GLUCOSE RANDOM mg/dL 244* 331*         Results from last 7 days   Lab Units 09/29/24  1555   HEMOGLOBIN A1C % 11.5*   EAG mg/dl 283     Beta- Hydroxybutyrate   Date Value Ref Range Status   09/29/2024 0.59 (H) 0.02 - 0.27 mmol/L Final     BETA-HYDROXYBUTYRATE   Date Value Ref Range Status   10/22/2022 1.7 (H) <0.6 mmol/L Final   10/21/2022 5.0 (H) <0.6 mmol/L Final          Results from last 7 days   Lab Units 09/29/24  1235   PH GIBRAN  7.488*   PCO2 GIBRAN mm Hg 29.6*   PO2 GIBRAN mm Hg 53.2*   HCO3 GIBRAN mmol/L 21.9*   BASE EXC GIBRAN mmol/L -0.1   O2 CONTENT GIBRAN ml/dL 19.5   O2 HGB, VENOUS % 88.2*         Results from last 7 days   Lab Units 09/29/24  1150   CK TOTAL U/L 53     Results from last 7 days   Lab Units 09/29/24  1334   PROTIME seconds 13.2   INR  0.95     Results from last 7 days   Lab Units 09/29/24  1555   TSH 3RD GENERATON uIU/mL 0.747     Results from last 7 days   Lab Units 09/30/24  0543 09/29/24  1136   PROCALCITONIN ng/ml 0.26* 0.11     Results from last 7 days   Lab Units 09/30/24  0543 09/29/24  1408 09/29/24  1150   LACTIC ACID mmol/L 1.0 1.7 3.0*       Results from last 7 days   Lab Units 09/29/24  1136   LIPASE u/L 52     Results  from last 7 days   Lab Units 09/30/24  0543   CRP mg/L 38.4*     Results from last 7 days   Lab Units 09/29/24  1802 09/29/24  1136   CLARITY UA   --  Slightly Cloudy   COLOR UA   --  Colorless   SPEC GRAV UA   --  <1.005*   PH UA   --  5.5   GLUCOSE UA mg/dl  --  1000 (1%)*   KETONES UA mg/dl  --  Trace*   BLOOD UA   --  Large*   PROTEIN UA mg/dl  --  Trace*   NITRITE UA   --  Negative   BILIRUBIN UA   --  Negative   UROBILINOGEN UA (BE) mg/dl  --  <2.0   LEUKOCYTES UA   --  Large*   WBC UA /hpf  --  Innumerable*   RBC UA /hpf  --  Field obscured, unable to enumerate*   BACTERIA UA /hpf  --  Field obscured, unable to enumerate*   EPITHELIAL CELLS WET PREP /hpf  --  Field obscured, unable to enumerate*   CREATININE UR mg/dL 31.0  --      Results from last 7 days   Lab Units 09/29/24  1334   BLOOD CULTURE  Received in Microbiology Lab. Culture in Progress.   GRAM STAIN RESULT  Gram negative rods*       Past Medical History:   Diagnosis Date    Diabetes mellitus ([facility])     Ear problems Year and a half    High cholesterol     Hypertension     Sinusitis      Present on Admission:     Type 2 diabetes mellitus without complication, without long-term current use of insulin ([facility])   Sepsis secondary to UTI  ([facility])   Primary hypertension      Admitting Diagnosis:     Hypomagnesemia [E83.42]  Pyelonephritis [N12]  Elevated lactic acid level [R79.89]  Sepsis ([facility]) [A41.9]  UTI symptoms [R39.9]  Elevated beta-hydroxybutyrate [R78.89]  Hyperglycemia due to type 2 diabetes mellitus ([facility]) [E11.65]    Age/Sex: 59 y.o. female    Network Utilization Review Department  ATTENTION: Please call with any questions or concerns to [phone] and carefully listen to the prompts so that you are directed to the right person. All voicemails are confidential.   For Discharge needs, contact Care Management DC Support Team at [phone] opt. 2  Send all requests for admission clinical reviews, approved or denied determinations and any other  requests to dedicated fax number below belonging to the campus where the patient is receiving treatment. List of dedicated fax numbers for the Facilities:  FACILITY NAME UR FAX NUMBER   ADMISSION DENIALS (Administrative/Medical Necessity) [phone]   DISCHARGE SUPPORT TEAM (NETWORK) [phone]   PARENT CHILD HEALTH (Maternity/NICU/Pediatrics) [phone]   [facility] [phone]   [facility] [phone]   [facility] [phone]   [facility] [phone]   [facility] [phone]   [facility] [phone]   [facility] [phone]   [facility] [phone]   [facility] [phone]   [facility] [phone]   [facility] [phone]   [facility] [phone]

## 2024-09-30 NOTE — ASSESSMENT & PLAN NOTE
1 out of 2 blood cultures on admission positive for E.coli. ESBL resistance gene detected. Most likely 2/2 to urosepsis as above. CT A/P did not show any evidence of intra-abdominal abnormality. Will switch from IV ceftriaxone to IV ertapenem given presence of ESBL resistance gene.   Stop IV ceftriaxone  Start IV ertapenem 1g daily  Follow-up further identification and sensitivities  Will narrow antibiotics as able based on culture data

## 2024-09-30 NOTE — ASSESSMENT & PLAN NOTE
Lab Results   Component Value Date    HGBA1C 11.5 (H) 09/29/2024     Recent Labs     09/29/24  1620 09/29/24 2059 09/30/24  0727 09/30/24  0945   POCGLU 272* 264* 245* 266*     Blood Sugar Average: Last 72 hrs:  (P) 261.75  Uncontrolled. With elevated A1C to 11.5 and hyperglycemia. Significantly increases risk of development of infection and poor wound healing.  Recommend tight glycemic control  Management per primary team

## 2024-10-01 PROBLEM — E87.6 HYPOKALEMIA: Status: ACTIVE | Noted: 2024-10-01

## 2024-10-01 LAB
ALBUMIN SERPL BCG-MCNC: 3.7 G/DL (ref 3.5–5)
ALP SERPL-CCNC: 50 U/L (ref 34–104)
ALT SERPL W P-5'-P-CCNC: 25 U/L (ref 7–52)
ANION GAP SERPL CALCULATED.3IONS-SCNC: 11 MMOL/L (ref 4–13)
AST SERPL W P-5'-P-CCNC: 17 U/L (ref 13–39)
BASOPHILS # BLD AUTO: 0.05 THOUSANDS/ÂΜL (ref 0–0.1)
BASOPHILS NFR BLD AUTO: 1 % (ref 0–1)
BILIRUB SERPL-MCNC: 0.46 MG/DL (ref 0.2–1)
BUN SERPL-MCNC: 8 MG/DL (ref 5–25)
CA-I BLD-SCNC: 1.11 MMOL/L (ref 1.12–1.32)
CALCIUM SERPL-MCNC: 8.4 MG/DL (ref 8.4–10.2)
CHLORIDE SERPL-SCNC: 104 MMOL/L (ref 96–108)
CO2 SERPL-SCNC: 22 MMOL/L (ref 21–32)
CREAT SERPL-MCNC: 0.63 MG/DL (ref 0.6–1.3)
CRP SERPL QL: 26.5 MG/L
EOSINOPHIL # BLD AUTO: 0.16 THOUSAND/ÂΜL (ref 0–0.61)
EOSINOPHIL NFR BLD AUTO: 2 % (ref 0–6)
ERYTHROCYTE [DISTWIDTH] IN BLOOD BY AUTOMATED COUNT: 12.1 % (ref 11.6–15.1)
GFR SERPL CREATININE-BSD FRML MDRD: 97 ML/MIN/1.73SQ M
GLUCOSE SERPL-MCNC: 165 MG/DL (ref 65–140)
GLUCOSE SERPL-MCNC: 202 MG/DL (ref 65–140)
GLUCOSE SERPL-MCNC: 203 MG/DL (ref 65–140)
GLUCOSE SERPL-MCNC: 211 MG/DL (ref 65–140)
GLUCOSE SERPL-MCNC: 245 MG/DL (ref 65–140)
HCT VFR BLD AUTO: 38.8 % (ref 34.8–46.1)
HGB BLD-MCNC: 13 G/DL (ref 11.5–15.4)
IMM GRANULOCYTES # BLD AUTO: 0.03 THOUSAND/UL (ref 0–0.2)
IMM GRANULOCYTES NFR BLD AUTO: 1 % (ref 0–2)
LYMPHOCYTES # BLD AUTO: 1.63 THOUSANDS/ÂΜL (ref 0.6–4.47)
LYMPHOCYTES NFR BLD AUTO: 25 % (ref 14–44)
MAGNESIUM SERPL-MCNC: 1.9 MG/DL (ref 1.9–2.7)
MCH RBC QN AUTO: 31.3 PG (ref 26.8–34.3)
MCHC RBC AUTO-ENTMCNC: 33.5 G/DL (ref 31.4–37.4)
MCV RBC AUTO: 94 FL (ref 82–98)
MONOCYTES # BLD AUTO: 0.76 THOUSAND/ÂΜL (ref 0.17–1.22)
MONOCYTES NFR BLD AUTO: 12 % (ref 4–12)
NEUTROPHILS # BLD AUTO: 3.94 THOUSANDS/ÂΜL (ref 1.85–7.62)
NEUTS SEG NFR BLD AUTO: 59 % (ref 43–75)
NRBC BLD AUTO-RTO: 0 /100 WBCS
PHOSPHATE SERPL-MCNC: 2.6 MG/DL (ref 2.3–4.1)
PLATELET # BLD AUTO: 275 THOUSANDS/UL (ref 149–390)
PMV BLD AUTO: 10.6 FL (ref 8.9–12.7)
POTASSIUM SERPL-SCNC: 3.4 MMOL/L (ref 3.5–5.3)
PROCALCITONIN SERPL-MCNC: 0.16 NG/ML
PROT SERPL-MCNC: 6.2 G/DL (ref 6.4–8.4)
RBC # BLD AUTO: 4.15 MILLION/UL (ref 3.81–5.12)
SODIUM SERPL-SCNC: 137 MMOL/L (ref 135–147)
WBC # BLD AUTO: 6.57 THOUSAND/UL (ref 4.31–10.16)

## 2024-10-01 PROCEDURE — 86140 C-REACTIVE PROTEIN: CPT

## 2024-10-01 PROCEDURE — 85025 COMPLETE CBC W/AUTO DIFF WBC: CPT | Performed by: INTERNAL MEDICINE

## 2024-10-01 PROCEDURE — 82330 ASSAY OF CALCIUM: CPT | Performed by: INTERNAL MEDICINE

## 2024-10-01 PROCEDURE — 84100 ASSAY OF PHOSPHORUS: CPT | Performed by: INTERNAL MEDICINE

## 2024-10-01 PROCEDURE — 99232 SBSQ HOSP IP/OBS MODERATE 35: CPT | Performed by: PHYSICIAN ASSISTANT

## 2024-10-01 PROCEDURE — 99232 SBSQ HOSP IP/OBS MODERATE 35: CPT | Performed by: INTERNAL MEDICINE

## 2024-10-01 PROCEDURE — 82948 REAGENT STRIP/BLOOD GLUCOSE: CPT

## 2024-10-01 PROCEDURE — 83735 ASSAY OF MAGNESIUM: CPT | Performed by: INTERNAL MEDICINE

## 2024-10-01 PROCEDURE — 80053 COMPREHEN METABOLIC PANEL: CPT | Performed by: INTERNAL MEDICINE

## 2024-10-01 PROCEDURE — 84145 PROCALCITONIN (PCT): CPT | Performed by: INTERNAL MEDICINE

## 2024-10-01 RX ORDER — INSULIN GLARGINE 100 [IU]/ML
10 INJECTION, SOLUTION SUBCUTANEOUS EVERY 12 HOURS SCHEDULED
Status: DISCONTINUED | OUTPATIENT
Start: 2024-10-01 | End: 2024-10-02 | Stop reason: HOSPADM

## 2024-10-01 RX ORDER — POTASSIUM CHLORIDE 1500 MG/1
40 TABLET, EXTENDED RELEASE ORAL ONCE
Status: COMPLETED | OUTPATIENT
Start: 2024-10-01 | End: 2024-10-01

## 2024-10-01 RX ADMIN — POTASSIUM CHLORIDE 40 MEQ: 1500 TABLET, EXTENDED RELEASE ORAL at 17:05

## 2024-10-01 RX ADMIN — ASPIRIN 81 MG 81 MG: 81 TABLET ORAL at 08:58

## 2024-10-01 RX ADMIN — INSULIN GLARGINE 10 UNITS: 100 INJECTION, SOLUTION SUBCUTANEOUS at 22:31

## 2024-10-01 RX ADMIN — AMLODIPINE BESYLATE 5 MG: 5 TABLET ORAL at 08:58

## 2024-10-01 RX ADMIN — ENOXAPARIN SODIUM 40 MG: 40 INJECTION SUBCUTANEOUS at 17:05

## 2024-10-01 RX ADMIN — FOLIC ACID TAB 400 MCG 400 MCG: 400 TAB at 08:58

## 2024-10-01 RX ADMIN — INSULIN LISPRO 3 UNITS: 100 INJECTION, SOLUTION INTRAVENOUS; SUBCUTANEOUS at 11:13

## 2024-10-01 RX ADMIN — INSULIN LISPRO 2 UNITS: 100 INJECTION, SOLUTION INTRAVENOUS; SUBCUTANEOUS at 17:06

## 2024-10-01 RX ADMIN — DICYCLOMINE HYDROCHLORIDE 20 MG: 20 TABLET ORAL at 17:05

## 2024-10-01 RX ADMIN — SODIUM CHLORIDE 50 ML/HR: 0.9 INJECTION, SOLUTION INTRAVENOUS at 09:00

## 2024-10-01 RX ADMIN — INSULIN LISPRO 2 UNITS: 100 INJECTION, SOLUTION INTRAVENOUS; SUBCUTANEOUS at 08:59

## 2024-10-01 RX ADMIN — ERTAPENEM SODIUM 1000 MG: 1 INJECTION, POWDER, LYOPHILIZED, FOR SOLUTION INTRAMUSCULAR; INTRAVENOUS at 12:46

## 2024-10-01 RX ADMIN — FAMOTIDINE 40 MG: 20 TABLET, FILM COATED ORAL at 17:05

## 2024-10-01 RX ADMIN — INSULIN GLARGINE 10 UNITS: 100 INJECTION, SOLUTION SUBCUTANEOUS at 08:58

## 2024-10-01 RX ADMIN — METOPROLOL SUCCINATE 25 MG: 25 TABLET, EXTENDED RELEASE ORAL at 08:59

## 2024-10-01 RX ADMIN — FLUTICASONE PROPIONATE 2 SPRAY: 50 SPRAY, METERED NASAL at 08:59

## 2024-10-01 RX ADMIN — ESCITALOPRAM OXALATE 5 MG: 10 TABLET ORAL at 08:58

## 2024-10-01 RX ADMIN — DICYCLOMINE HYDROCHLORIDE 20 MG: 20 TABLET ORAL at 08:58

## 2024-10-01 RX ADMIN — FAMOTIDINE 40 MG: 20 TABLET, FILM COATED ORAL at 08:58

## 2024-10-01 RX ADMIN — INSULIN LISPRO 1 UNITS: 100 INJECTION, SOLUTION INTRAVENOUS; SUBCUTANEOUS at 22:30

## 2024-10-01 NOTE — PROGRESS NOTES
Progress Note - Hospitalist   Name: Korin Wilhelm 59 y.o. female I MRN: [id number]  Unit/Bed#: 421-01 I Date of Admission: 9/29/2024   Date of Service: 10/1/2024 I Hospital Day: 2     Assessment & Plan  Sepsis secondary to UTI  (HCC)  Patient currently managed for urosepsis, after presenting with 15 days intermittent history of burning micturition, frequency,hesitancy and suprapubic heaviness while on cruise trip to [place]. Initially managed with 1 week course of ciprofloxacin but symptoms returned again yesterday along with hematuria which prompted a visit to the ED. Patient denies any fever, vomiting or flank pain   Examination initially revealed dry coated tongue, mild tenderness in hypogastric region on deep palpation, no rebound, no guarding no CVA tenderness.  CBC; down trending wbc, CMP normal, Mg 1.5  Blood culture significant for Ecoli bacteremia   Urine RE : positive leucocytes, RBCs and LA, urine culture awaited  Initial lactic acid 3, then 1.7(N)---- likely due to metformin   Pro Ji and CRP down trending   CT scan : IMPRESSION: 1. No acute inflammatory or infectious process. 2. Diverticulosis coli without diverticulitis.    Plan:  Continue Ertapenem 1 gm OD per ID team, appreciate reccs    IVF NS @ 100ml hr  Encourage fluids per oral   Acetaminophen for pain/ fever PRN    Ondansetron for nausea/Vomiting PRN    F/U Urine culture, Trend Q-CRP daily  Continue to follow CBCD and CMP with AM labs to monitor for potential antimicrobial toxicities and assess for clinical response to antibiotics.   Type 2 diabetes mellitus without complication, without long-term current use of insulin ([facility])  Lab Results   Component Value Date    HGBA1C 11.5 (H) 09/29/2024     Known Diabetic since last 10 years, poorly controlled HbA1c 11.5   Managed with Metformin 1000 mg BID and Trulicity once a week at home   At time of admission , VBG PH: 7.488, Anion gap 15, bicarbonate 22, urine ketones positive (likely  dehydration vs mild DKA in context of underlying UTI)  Diabetic education given as patient non compliant diet   Insulin lispro sliding scale 3 times a day,  Lantus @ 10 units   Lipid panel abnormal, consider statins   F/U albumin to creatinine ratio        Blood Sugar Average: Last 72 hrs:  (P) 234  Primary hypertension  Chronic and well controlled per patient   Continue home medication amlodipine   Withhold Valsartan/HCTZ till dehydration subsides   Bacteremia due to Escherichia coli  Continue Ertepenem 1gm OD per ID team, appreciate reccs    Follow-up further identification and sensitivities  Will narrow antibiotics as able based on culture data  History of Present Illness   24 Hour Events : No significant evens overnight, patient remains afebrile and vitally stable   Subjective : Clinically improved     Objective    Temp:  [97.6 °F (36.4 °C)-98.1 °F (36.7 °C)] 97.9 °F (36.6 °C)  HR:  [75-84] 75  Resp:  [17-18] 18  BP: (125-131)/(85-86) 131/85  O2 Device: None (Room air)          I/O last 24 hours:  In: 3572.5 [P.O.:540; I.V.:3032.5]  Out: -       Physical Exam  Constitutional:       Appearance: She is obese.   HENT:      Head: Normocephalic and atraumatic.      Nose: Nose normal.   Eyes:      Conjunctiva/sclera: Conjunctivae normal.   Abdominal:      General: Bowel sounds are normal.      Palpations: Abdomen is soft.      Tenderness: There is no abdominal tenderness. There is no right CVA tenderness, left CVA tenderness, guarding or rebound.   Musculoskeletal:         General: Normal range of motion.   Skin:     General: Skin is warm and dry.      Capillary Refill: Capillary refill takes less than 2 seconds.   Neurological:      General: No focal deficit present.      Mental Status: She is alert and oriented to person, place, and time.         Lab Results: I have reviewed the following results: CBC/BMP:   .     10/01/24  0522   WBC 6.57   HGB 13.0   HCT 38.8      SODIUM 137   K 3.4*      CO2 22   BUN  8   CREATININE 0.63   GLUC 203*   MG 1.9   PHOS 2.6    , Procalcitonin:   Lab Results   Component Value Date    PROCALCITONI 0.16 10/01/2024   , Lipid Profile:   Results from last 7 days   Lab Units 09/30/24  0543   HDL mg/dL 28*   LDL CALC mg/dL 36   TRIGLYCERIDES mg/dL 157*     Imaging Review: Reviewed radiology reports from this admission including: CT abdomen/pelvis.  Other Studies: No additional pertinent studies reviewed.    VTE Pharmacologic Prophylaxis: VTE covered by:  enoxaparin, Subcutaneous, 40 mg at 09/30/24 1813     VTE Mechanical Prophylaxis: reason for no mechanical VTE prophylaxis not needed    Administrative Statements   I have spent a total time of 30 minutes in caring for this patient on the day of the visit/encounter including Diagnostic results, Prognosis, Importance of tx compliance, Documenting in the medical record, Reviewing / ordering tests, medicine, procedures  , Obtaining or reviewing history  , and Communicating with other healthcare professionals .

## 2024-10-01 NOTE — ASSESSMENT & PLAN NOTE
Patient currently managed for urosepsis, after presenting with 15 days intermittent history of burning micturition, frequency,hesitancy and suprapubic heaviness while on cruise trip to [place]. Initially managed with 1 week course of ciprofloxacin but symptoms returned again yesterday along with hematuria which prompted a visit to the ED. Patient denies any fever, vomiting or flank pain   Examination initially revealed dry coated tongue, mild tenderness in hypogastric region on deep palpation, no rebound, no guarding no CVA tenderness.  CBC; down trending wbc, CMP normal, Mg 1.5  Blood culture significant for Ecoli bacteremia   Urine RE : positive leucocytes, RBCs and LA, urine culture awaited  Initial lactic acid 3, then 1.7(N)---- likely due to metformin   Pro Ji and CRP down trending   CT scan : IMPRESSION: 1. No acute inflammatory or infectious process. 2. Diverticulosis coli without diverticulitis.    Plan:  Continue Ertapenem 1 gm OD per ID team, appreciate reccs    IVF NS @ 100ml hr  Encourage fluids per oral   Acetaminophen for pain/ fever PRN    Ondansetron for nausea/Vomiting PRN    F/U Urine culture, Trend Q-CRP daily  Continue to follow CBCD and CMP with AM labs to monitor for potential antimicrobial toxicities and assess for clinical response to antibiotics.

## 2024-10-01 NOTE — ASSESSMENT & PLAN NOTE
E/b tachycardia and leukocytosis. Also with lactic acidosis. Reported 15d history of dysuria, frequency, and hematuria which she developed while she was on a cruise to [place]. Was treated with ciprofloxacin on the cruise by the on-board Physician. Reports compliance and initial improvement in hematuria, which then worsened prompting ED evaluation. CT A/P without any evidence of acute intra-abdominal abnormalities. UA obtained demonstrated innumerable WBC, large leukocytes, and large blood. Ucx positive for >100k E.coli. Course now further complicated by bacteremia as below.   Antibiotic as below  Continue to monitor temperature/vitals  Follow-up blood cultures until finalized  Follow-up urine culture sensitivities  Continue to follow CBCD and CMP with AM labs to monitor for potential antimicrobial toxicities and assess for clinical response to antibiotics.

## 2024-10-01 NOTE — ASSESSMENT & PLAN NOTE
Chronic and well controlled per patient   Continue home medication amlodipine   Withhold Valsartan/HCTZ till dehydration subsides    No

## 2024-10-01 NOTE — PLAN OF CARE
Problem: PAIN - ADULT  Goal: Verbalizes/displays adequate comfort level or baseline comfort level  Description: Interventions:  - Encourage patient to monitor pain and request assistance  - Assess pain using appropriate pain scale  - Administer analgesics based on type and severity of pain and evaluate response  - Implement non-pharmacological measures as appropriate and evaluate response  - Consider cultural and social influences on pain and pain management  - Notify physician/advanced practitioner if interventions unsuccessful or patient reports new pain  Outcome: Progressing     Problem: INFECTION - ADULT  Goal: Absence or prevention of progression during hospitalization  Description: INTERVENTIONS:  - Assess and monitor for signs and symptoms of infection  - Monitor lab/diagnostic results  - Monitor all insertion sites, i.e. indwelling lines, tubes, and drains  - Monitor endotracheal if appropriate and nasal secretions for changes in amount and color  - [place] appropriate cooling/warming therapies per order  - Administer medications as ordered  - Instruct and encourage patient and family to use good hand hygiene technique  - Identify and instruct in appropriate isolation precautions for identified infection/condition  Outcome: Progressing     Problem: DISCHARGE PLANNING  Goal: Discharge to home or other facility with appropriate resources  Description: INTERVENTIONS:  - Identify barriers to discharge w/patient and caregiver  - Arrange for needed discharge resources and transportation as appropriate  - Identify discharge learning needs (meds, wound care, etc.)  - Arrange for interpretive services to assist at discharge as needed  - Refer to Case Management Department for coordinating discharge planning if the patient needs post-hospital services based on physician/advanced practitioner order or complex needs related to functional status, cognitive ability, or social support system  Outcome: Progressing      Problem: Knowledge Deficit  Goal: Patient/family/caregiver demonstrates understanding of disease process, treatment plan, medications, and discharge instructions  Description: Complete learning assessment and assess knowledge base.  Interventions:  - Provide teaching at level of understanding  - Provide teaching via preferred learning methods  Outcome: Progressing     Problem: GENITOURINARY - ADULT  Goal: Maintains or returns to baseline urinary function  Description: INTERVENTIONS:  - Assess urinary function  - Encourage oral fluids to ensure adequate hydration if ordered  - Administer IV fluids as ordered to ensure adequate hydration  - Administer ordered medications as needed  - Offer frequent toileting  - Follow urinary retention protocol if ordered  Outcome: Progressing     Problem: Nutrition/Hydration-ADULT  Goal: Nutrient/Hydration intake appropriate for improving, restoring or maintaining nutritional needs  Description: Monitor and assess patient's nutrition/hydration status for malnutrition. Collaborate with interdisciplinary team and initiate plan and interventions as ordered.  Monitor patient's weight and dietary intake as ordered or per policy. Utilize nutrition screening tool and intervene as necessary. Determine patient's food preferences and provide high-protein, high-caloric foods as appropriate.     INTERVENTIONS:  - Monitor oral intake, urinary output, labs, and treatment plans  - Assess nutrition and hydration status and recommend course of action  - Evaluate amount of meals eaten  - Assist patient with eating if necessary   - Allow adequate time for meals  - Recommend/ encourage appropriate diets, oral nutritional supplements, and vitamin/mineral supplements  - Order, calculate, and assess calorie counts as needed  - Recommend, monitor, and adjust tube feedings and TPN/PPN based on assessed needs  - Assess need for intravenous fluids  - Provide specific nutrition/hydration education as  appropriate  - Include patient/family/caregiver in decisions related to nutrition  Outcome: Progressing

## 2024-10-01 NOTE — ASSESSMENT & PLAN NOTE
Lab Results   Component Value Date    HGBA1C 11.5 (H) 09/29/2024     Recent Labs     09/30/24  1616 09/30/24  2113 10/01/24  0715 10/01/24  1042   POCGLU 199* 190* 202* 245*     Blood Sugar Average: Last 72 hrs:  (P) 235.375  Uncontrolled. With elevated A1C to 11.5 and hyperglycemia. Significantly increases risk of development of infection and poor wound healing.  Recommend tight glycemic control  Management per primary team

## 2024-10-01 NOTE — PROGRESS NOTES
Progress Note - Infectious Disease   Name: Korin Wilhelm 59 y.o. female I MRN: [id number]  Unit/Bed#: 421-01 I Date of Admission: 9/29/2024   Date of Service: 10/1/2024 I Hospital Day: 2      REQUIRED DOCUMENTATION:     1. This service was provided via Telemedicine.  2. Provider located at [facility].  3. TeleMed provider: Janeth Miranda PA-C  4. Identify all parties in room with patient during tele consult: Patient, PA-C  5. After connecting through [email or web address], patient was identified by name and date of birth and assistant checked wristband.  Patient was then informed that this was a Telemedicine visit and that the exam was being conducted confidentially over secure lines. My office door was closed. No one else was in the room.  Patient acknowledged consent and understanding of privacy and security of the Telemedicine visit, and gave us permission to have the assistant stay in the room in order to assist with the history and to conduct the exam.  I informed the patient that I have reviewed their record in Epic and presented the opportunity for them to ask any questions regarding the visit today.  The patient agreed to participate.     Assessment & Plan  Sepsis secondary to UTI  (HCC)  E/b tachycardia and leukocytosis. Also with lactic acidosis. Reported 15d history of dysuria, frequency, and hematuria which she developed while she was on a cruise to [place]. Was treated with ciprofloxacin on the cruise by the on-board Physician. Reports compliance and initial improvement in hematuria, which then worsened prompting ED evaluation. CT A/P without any evidence of acute intra-abdominal abnormalities. UA obtained demonstrated innumerable WBC, large leukocytes, and large blood. Ucx positive for >100k E.coli. Course now further complicated by bacteremia as below.   Antibiotic as below  Continue to monitor temperature/vitals  Follow-up blood cultures until finalized  Follow-up urine culture  sensitivities  Continue to follow CBCD and CMP with AM labs to monitor for potential antimicrobial toxicities and assess for clinical response to antibiotics.   Bacteremia due to Escherichia coli  1 out of 2 blood cultures on admission positive for E.coli. ESBL resistance gene detected. Most likely 2/2 to urosepsis as above. CT A/P did not show any evidence of intra-abdominal abnormality. Switched from IV ceftriaxone to IV ertapenem given presence of ESBL resistance gene. Will follow-up sensitivities and switch to PO option if there is one available   Continue IV ertapenem 1g daily  Follow-up sensitivities  Will narrow antibiotics as able based on culture data  Anticipate 7d total antibiotic course (through 10/6)  Type 2 diabetes mellitus without complication, without long-term current use of insulin ([facility])  Lab Results   Component Value Date    HGBA1C 11.5 (H) 09/29/2024     Recent Labs     09/30/24  1616 09/30/24  2113 10/01/24  0715 10/01/24  1042   POCGLU 199* 190* 202* 245*     Blood Sugar Average: Last 72 hrs:  (P) 235.375  Uncontrolled. With elevated A1C to 11.5 and hyperglycemia. Significantly increases risk of development of infection and poor wound healing.  Recommend tight glycemic control  Management per primary team     Infectious Disease service will follow.  Plan discussed with patient via telemedicine Tvkit  Plan discussed with primary team attending who agrees to continue IV ertapenem for now while awaiting further sensitivities to return.     History of Present Illness   Feeling well today. Eager to go home. Denies fevers, chills, N/V/D, CP, SOB, abdominal pain. Urinary symptoms resolved. No longer having suprapubic pressure. Gross hematuria resolved. Tolerating IV ertapenem without difficulty.     Objective      Temp:  [97.6 °F (36.4 °C)-98.1 °F (36.7 °C)] 97.9 °F (36.6 °C)  HR:  [75-84] 75  Resp:  [17-18] 18  BP: (125-131)/(85-86) 131/85  O2 Device: None (Room air)          I/O last 24 hours:  In:  3992.5 [P.O.:960; I.V.:3032.5]  Out: -   Lines/Drains/Airways       Active Status       None                  Physical Exam     Physical exam findings reported by bedside and primary medical team staff, also observed over TV kit:   General Appearance:  Alert, interactive, nontoxic, no acute distress. Sitting upright in chair next to bed, appears well.    Throat: Oropharynx moist without lesions.    Lungs:   Clear to auscultation bilaterally; no wheezes, rhonchi or rales; respirations unlabored. On room air.    Heart:  RRR; no murmur, rub or gallop.   Abdomen:   Soft, non-tender, non-distended, positive bowel sounds. No CVA tenderness.    Extremities: No clubbing or cyanosis, no edema.   Skin: No new rashes, lesions, or draining wounds noted on exposed skin.        Lab Results: I have reviewed the following results:   Recent Labs     09/29/24  1334 09/29/24  1408 09/30/24  0543 10/01/24  0522   WBC  --   --  6.96 6.57   HGB  --   --  13.4 13.0   HCT  --   --  40.0 38.8   PLT  --    < > 276 275   SODIUM  --   --  136 137   K  --   --  3.5 3.4*   CL  --   --  101 104   CO2  --   --  26 22   BUN  --   --  11 8   CREATININE  --   --  0.72 0.63   GLUC  --   --  244* 203*   MG  --   --  2.1 1.9   PHOS  --    < > 2.6 2.6   AST  --   --  16 17   ALT  --   --  25 25   ALB  --   --  3.9 3.7   TBILI  --   --  0.73 0.46   ALKPHOS  --   --  53 50   INR 0.95  --   --   --    LACTICACID  --    < > 1.0  --     < > = values in this interval not displayed.     Micro:  Lab Results   Component Value Date    BLOODCX No Growth at 24 hrs. 09/29/2024    BLOODCX Escherichia coli (A) 09/29/2024    URINECX >100,000 cfu/ml Escherichia coli (A) 09/29/2024    URINECX 10,000-19,000 cfu/ml 07/20/2023    URINECX 20,000-29,000 cfu/ml 10/21/2022     Imaging Review: Personally reviewed the following image studies in PACS and associated radiology reports: CT abdomen/pelvis. My interpretation of the radiology images/reports is: No acute intra-abdominal  process..  Other Studies: No additional pertinent studies reviewed.      Janeth Miranda PA-C  [facility]

## 2024-10-01 NOTE — ASSESSMENT & PLAN NOTE
Lab Results   Component Value Date    HGBA1C 11.5 (H) 09/29/2024     Known Diabetic since last 10 years, poorly controlled HbA1c 11.5   Managed with Metformin 1000 mg BID and Trulicity once a week at home   At time of admission , VBG PH: 7.488, Anion gap 15, bicarbonate 22, urine ketones positive (likely dehydration vs mild DKA in context of underlying UTI)  Diabetic education given as patient non compliant diet   Insulin lispro sliding scale 3 times a day,  Lantus @ 10 units   Lipid panel abnormal, consider statins   F/U albumin to creatinine ratio        Blood Sugar Average: Last 72 hrs:  (P) 234

## 2024-10-01 NOTE — ASSESSMENT & PLAN NOTE
1 out of 2 blood cultures on admission positive for E.coli. ESBL resistance gene detected. Most likely 2/2 to urosepsis as above. CT A/P did not show any evidence of intra-abdominal abnormality. Switched from IV ceftriaxone to IV ertapenem given presence of ESBL resistance gene. Will follow-up sensitivities and switch to PO option if there is one available   Continue IV ertapenem 1g daily  Follow-up sensitivities  Will narrow antibiotics as able based on culture data  Anticipate 7d total antibiotic course (through 10/6)

## 2024-10-02 ENCOUNTER — TRANSITIONAL CARE MANAGEMENT (OUTPATIENT)
Dept: FAMILY MEDICINE CLINIC | Facility: CLINIC | Age: 59
End: 2024-10-02

## 2024-10-02 VITALS
TEMPERATURE: 98.2 F | HEIGHT: 71 IN | OXYGEN SATURATION: 98 % | RESPIRATION RATE: 17 BRPM | BODY MASS INDEX: 30.22 KG/M2 | SYSTOLIC BLOOD PRESSURE: 156 MMHG | HEART RATE: 91 BPM | DIASTOLIC BLOOD PRESSURE: 85 MMHG | WEIGHT: 215.83 LBS

## 2024-10-02 PROBLEM — K57.30 DIVERTICULOSIS OF COLON: Status: ACTIVE | Noted: 2024-10-02

## 2024-10-02 LAB
ALBUMIN SERPL BCG-MCNC: 3.5 G/DL (ref 3.5–5)
ALP SERPL-CCNC: 50 U/L (ref 34–104)
ALT SERPL W P-5'-P-CCNC: 33 U/L (ref 7–52)
ANION GAP SERPL CALCULATED.3IONS-SCNC: 9 MMOL/L (ref 4–13)
AST SERPL W P-5'-P-CCNC: 23 U/L (ref 13–39)
BACTERIA BLD CULT: ABNORMAL
BACTERIA UR CULT: ABNORMAL
BASOPHILS # BLD AUTO: 0.05 THOUSANDS/ÂΜL (ref 0–0.1)
BASOPHILS NFR BLD AUTO: 1 % (ref 0–1)
BILIRUB SERPL-MCNC: 0.38 MG/DL (ref 0.2–1)
BLACTX-M ISLT/SPM QL: DETECTED
BUN SERPL-MCNC: 7 MG/DL (ref 5–25)
CALCIUM SERPL-MCNC: 8.5 MG/DL (ref 8.4–10.2)
CHLORIDE SERPL-SCNC: 108 MMOL/L (ref 96–108)
CO2 SERPL-SCNC: 23 MMOL/L (ref 21–32)
CREAT SERPL-MCNC: 0.55 MG/DL (ref 0.6–1.3)
CRP SERPL QL: 16 MG/L
E COLI DNA BLD POS QL NAA+NON-PROBE: DETECTED
EOSINOPHIL # BLD AUTO: 0.22 THOUSAND/ÂΜL (ref 0–0.61)
EOSINOPHIL NFR BLD AUTO: 4 % (ref 0–6)
ERYTHROCYTE [DISTWIDTH] IN BLOOD BY AUTOMATED COUNT: 12.3 % (ref 11.6–15.1)
GFR SERPL CREATININE-BSD FRML MDRD: 102 ML/MIN/1.73SQ M
GLUCOSE SERPL-MCNC: 153 MG/DL (ref 65–140)
GLUCOSE SERPL-MCNC: 166 MG/DL (ref 65–140)
GLUCOSE SERPL-MCNC: 203 MG/DL (ref 65–140)
GRAM STN SPEC: ABNORMAL
HCT VFR BLD AUTO: 37.4 % (ref 34.8–46.1)
HGB BLD-MCNC: 12.3 G/DL (ref 11.5–15.4)
IMM GRANULOCYTES # BLD AUTO: 0.02 THOUSAND/UL (ref 0–0.2)
IMM GRANULOCYTES NFR BLD AUTO: 0 % (ref 0–2)
LYMPHOCYTES # BLD AUTO: 1.48 THOUSANDS/ÂΜL (ref 0.6–4.47)
LYMPHOCYTES NFR BLD AUTO: 24 % (ref 14–44)
MAGNESIUM SERPL-MCNC: 1.9 MG/DL (ref 1.9–2.7)
MCH RBC QN AUTO: 31.4 PG (ref 26.8–34.3)
MCHC RBC AUTO-ENTMCNC: 32.9 G/DL (ref 31.4–37.4)
MCV RBC AUTO: 95 FL (ref 82–98)
MONOCYTES # BLD AUTO: 0.72 THOUSAND/ÂΜL (ref 0.17–1.22)
MONOCYTES NFR BLD AUTO: 12 % (ref 4–12)
NEUTROPHILS # BLD AUTO: 3.58 THOUSANDS/ÂΜL (ref 1.85–7.62)
NEUTS SEG NFR BLD AUTO: 59 % (ref 43–75)
NRBC BLD AUTO-RTO: 0 /100 WBCS
PHOSPHATE SERPL-MCNC: 2.8 MG/DL (ref 2.3–4.1)
PLATELET # BLD AUTO: 304 THOUSANDS/UL (ref 149–390)
PMV BLD AUTO: 10.7 FL (ref 8.9–12.7)
POTASSIUM SERPL-SCNC: 3.5 MMOL/L (ref 3.5–5.3)
PROCALCITONIN SERPL-MCNC: 0.11 NG/ML
PROT SERPL-MCNC: 5.9 G/DL (ref 6.4–8.4)
RBC # BLD AUTO: 3.92 MILLION/UL (ref 3.81–5.12)
SODIUM SERPL-SCNC: 140 MMOL/L (ref 135–147)
WBC # BLD AUTO: 6.07 THOUSAND/UL (ref 4.31–10.16)

## 2024-10-02 PROCEDURE — 83735 ASSAY OF MAGNESIUM: CPT | Performed by: INTERNAL MEDICINE

## 2024-10-02 PROCEDURE — 82948 REAGENT STRIP/BLOOD GLUCOSE: CPT

## 2024-10-02 PROCEDURE — 84100 ASSAY OF PHOSPHORUS: CPT | Performed by: INTERNAL MEDICINE

## 2024-10-02 PROCEDURE — 85025 COMPLETE CBC W/AUTO DIFF WBC: CPT | Performed by: INTERNAL MEDICINE

## 2024-10-02 PROCEDURE — 86140 C-REACTIVE PROTEIN: CPT

## 2024-10-02 PROCEDURE — 99232 SBSQ HOSP IP/OBS MODERATE 35: CPT | Performed by: PHYSICIAN ASSISTANT

## 2024-10-02 PROCEDURE — 84145 PROCALCITONIN (PCT): CPT

## 2024-10-02 PROCEDURE — 80053 COMPREHEN METABOLIC PANEL: CPT

## 2024-10-02 PROCEDURE — 99239 HOSP IP/OBS DSCHRG MGMT >30: CPT | Performed by: INTERNAL MEDICINE

## 2024-10-02 RX ORDER — DIPHENOXYLATE HCL/ATROPINE 2.5-.025MG
2 TABLET ORAL ONCE
Status: COMPLETED | OUTPATIENT
Start: 2024-10-02 | End: 2024-10-02

## 2024-10-02 RX ORDER — LANOLIN ALCOHOL/MO/W.PET/CERES
400 CREAM (GRAM) TOPICAL ONCE
Status: COMPLETED | OUTPATIENT
Start: 2024-10-02 | End: 2024-10-02

## 2024-10-02 RX ORDER — POTASSIUM CHLORIDE 1500 MG/1
40 TABLET, EXTENDED RELEASE ORAL ONCE
Status: COMPLETED | OUTPATIENT
Start: 2024-10-02 | End: 2024-10-02

## 2024-10-02 RX ORDER — FAMOTIDINE 40 MG/1
20 TABLET, FILM COATED ORAL 2 TIMES DAILY
Start: 2024-10-02

## 2024-10-02 RX ORDER — ACETAMINOPHEN 325 MG/1
650 TABLET ORAL EVERY 6 HOURS PRN
Start: 2024-10-02

## 2024-10-02 RX ORDER — SODIUM CHLORIDE 9 MG/ML
20 INJECTION, SOLUTION INTRAVENOUS ONCE
Status: CANCELLED | OUTPATIENT
Start: 2024-10-03

## 2024-10-02 RX ADMIN — DIPHENOXYLATE HYDROCHLORIDE AND ATROPINE SULFATE 2 TABLET: 2.5; .025 TABLET ORAL at 09:18

## 2024-10-02 RX ADMIN — SODIUM CHLORIDE 50 ML/HR: 0.9 INJECTION, SOLUTION INTRAVENOUS at 05:02

## 2024-10-02 RX ADMIN — FLUTICASONE PROPIONATE 2 SPRAY: 50 SPRAY, METERED NASAL at 09:17

## 2024-10-02 RX ADMIN — ASPIRIN 81 MG 81 MG: 81 TABLET ORAL at 09:13

## 2024-10-02 RX ADMIN — FOLIC ACID TAB 400 MCG 400 MCG: 400 TAB at 09:13

## 2024-10-02 RX ADMIN — AMLODIPINE BESYLATE 5 MG: 5 TABLET ORAL at 09:13

## 2024-10-02 RX ADMIN — DICYCLOMINE HYDROCHLORIDE 20 MG: 20 TABLET ORAL at 09:13

## 2024-10-02 RX ADMIN — ESCITALOPRAM OXALATE 5 MG: 10 TABLET ORAL at 09:13

## 2024-10-02 RX ADMIN — ERTAPENEM SODIUM 1000 MG: 1 INJECTION, POWDER, LYOPHILIZED, FOR SOLUTION INTRAMUSCULAR; INTRAVENOUS at 12:28

## 2024-10-02 RX ADMIN — Medication 400 MG: at 12:28

## 2024-10-02 RX ADMIN — INSULIN LISPRO 2 UNITS: 100 INJECTION, SOLUTION INTRAVENOUS; SUBCUTANEOUS at 12:27

## 2024-10-02 RX ADMIN — INSULIN LISPRO 1 UNITS: 100 INJECTION, SOLUTION INTRAVENOUS; SUBCUTANEOUS at 09:14

## 2024-10-02 RX ADMIN — FAMOTIDINE 40 MG: 20 TABLET, FILM COATED ORAL at 09:13

## 2024-10-02 RX ADMIN — INSULIN GLARGINE 10 UNITS: 100 INJECTION, SOLUTION SUBCUTANEOUS at 09:12

## 2024-10-02 RX ADMIN — METOPROLOL SUCCINATE 25 MG: 25 TABLET, EXTENDED RELEASE ORAL at 09:13

## 2024-10-02 RX ADMIN — IPRATROPIUM BROMIDE 2 SPRAY: 42 SPRAY, METERED NASAL at 09:19

## 2024-10-02 RX ADMIN — POTASSIUM CHLORIDE 40 MEQ: 1500 TABLET, EXTENDED RELEASE ORAL at 12:28

## 2024-10-02 NOTE — ASSESSMENT & PLAN NOTE
"Present on admission and due to ESBL-producing Escherichia coli bacteremia with an ESBL-producing Escherichia coli UTI (urinary tract infection)  Please see the assessment and plan for \"Bacteremia due to Escherichia coli\"  "

## 2024-10-02 NOTE — PLAN OF CARE
Problem: PAIN - ADULT  Goal: Verbalizes/displays adequate comfort level or baseline comfort level  Description: Interventions:  - Encourage patient to monitor pain and request assistance  - Assess pain using appropriate pain scale  - Administer analgesics based on type and severity of pain and evaluate response  - Implement non-pharmacological measures as appropriate and evaluate response  - Consider cultural and social influences on pain and pain management  - Notify physician/advanced practitioner if interventions unsuccessful or patient reports new pain  Outcome: Progressing     Problem: INFECTION - ADULT  Goal: Absence or prevention of progression during hospitalization  Description: INTERVENTIONS:  - Assess and monitor for signs and symptoms of infection  - Monitor lab/diagnostic results  - Monitor all insertion sites, i.e. indwelling lines, tubes, and drains  - Monitor endotracheal if appropriate and nasal secretions for changes in amount and color  - [place] appropriate cooling/warming therapies per order  - Administer medications as ordered  - Instruct and encourage patient and family to use good hand hygiene technique  - Identify and instruct in appropriate isolation precautions for identified infection/condition  Outcome: Progressing     Problem: Knowledge Deficit  Goal: Patient/family/caregiver demonstrates understanding of disease process, treatment plan, medications, and discharge instructions  Description: Complete learning assessment and assess knowledge base.  Interventions:  - Provide teaching at level of understanding  - Provide teaching via preferred learning methods  Outcome: Progressing

## 2024-10-02 NOTE — ASSESSMENT & PLAN NOTE
Continue her home anti-hypertensive regimen upon discharge  Outpatient follow-up with PCP in regards to this matter

## 2024-10-02 NOTE — ASSESSMENT & PLAN NOTE
Resolved with potassium chloride supplementation treatment during the hospitalization  Follow the potassium level and magnesium level after discharge with her PCP

## 2024-10-02 NOTE — NURSING NOTE
AVS printed and discharge instructions reviewed with Patient including infusion therapy appointments, upcoming lab testing, diabetic diet, medication regimen. Patient offers no questions and states her understanding of same. Continuous monitoring device placed back on charging/docking station. IV discontinued. Patient ambulated to elevator. Patient discharged with all belongings.

## 2024-10-02 NOTE — PLAN OF CARE
Problem: PAIN - ADULT  Goal: Verbalizes/displays adequate comfort level or baseline comfort level  Description: Interventions:  - Encourage patient to monitor pain and request assistance  - Assess pain using appropriate pain scale  - Administer analgesics based on type and severity of pain and evaluate response  - Implement non-pharmacological measures as appropriate and evaluate response  - Consider cultural and social influences on pain and pain management  - Notify physician/advanced practitioner if interventions unsuccessful or patient reports new pain  Outcome: Adequate for Discharge     Problem: INFECTION - ADULT  Goal: Absence or prevention of progression during hospitalization  Description: INTERVENTIONS:  - Assess and monitor for signs and symptoms of infection  - Monitor lab/diagnostic results  - Monitor all insertion sites, i.e. indwelling lines, tubes, and drains  - Monitor endotracheal if appropriate and nasal secretions for changes in amount and color  - [place] appropriate cooling/warming therapies per order  - Administer medications as ordered  - Instruct and encourage patient and family to use good hand hygiene technique  - Identify and instruct in appropriate isolation precautions for identified infection/condition  Outcome: Adequate for Discharge     Problem: DISCHARGE PLANNING  Goal: Discharge to home or other facility with appropriate resources  Description: INTERVENTIONS:  - Identify barriers to discharge w/patient and caregiver  - Arrange for needed discharge resources and transportation as appropriate  - Identify discharge learning needs (meds, wound care, etc.)  - Arrange for interpretive services to assist at discharge as needed  - Refer to Case Management Department for coordinating discharge planning if the patient needs post-hospital services based on physician/advanced practitioner order or complex needs related to functional status, cognitive ability, or social support  system  Outcome: Adequate for Discharge     Problem: Knowledge Deficit  Goal: Patient/family/caregiver demonstrates understanding of disease process, treatment plan, medications, and discharge instructions  Description: Complete learning assessment and assess knowledge base.  Interventions:  - Provide teaching at level of understanding  - Provide teaching via preferred learning methods  Outcome: Adequate for Discharge     Problem: GENITOURINARY - ADULT  Goal: Maintains or returns to baseline urinary function  Description: INTERVENTIONS:  - Assess urinary function  - Encourage oral fluids to ensure adequate hydration if ordered  - Administer IV fluids as ordered to ensure adequate hydration  - Administer ordered medications as needed  - Offer frequent toileting  - Follow urinary retention protocol if ordered  Outcome: Adequate for Discharge     Problem: CARDIOVASCULAR - ADULT  Goal: Maintains optimal cardiac output and hemodynamic stability  Description: INTERVENTIONS:  - Monitor I/O, vital signs and rhythm  - Monitor for S/S and trends of decreased cardiac output  - Administer and titrate ordered vasoactive medications to optimize hemodynamic stability  - Assess quality of pulses, skin color and temperature  - Assess for signs of decreased coronary artery perfusion  - Instruct patient to report change in severity of symptoms  Outcome: Adequate for Discharge     Problem: RESPIRATORY - ADULT  Goal: Achieves optimal ventilation and oxygenation  Description: INTERVENTIONS:  - Assess for changes in respiratory status  - Assess for changes in mentation and behavior  - Position to facilitate oxygenation and minimize respiratory effort  - Oxygen administered by appropriate delivery if ordered  - Initiate smoking cessation education as indicated  - Encourage broncho-pulmonary hygiene including cough, deep breathe, Incentive Spirometry  - Assess the need for suctioning and aspirate as needed  - Assess and instruct to report  SOB or any respiratory difficulty  - Respiratory Therapy support as indicated  Outcome: Adequate for Discharge     Problem: METABOLIC, FLUID AND ELECTROLYTES - ADULT  Goal: Electrolytes maintained within normal limits  Description: INTERVENTIONS:  - Monitor labs and assess patient for signs and symptoms of electrolyte imbalances  - Administer electrolyte replacement as ordered  - Monitor response to electrolyte replacements, including repeat lab results as appropriate  - Instruct patient on fluid and nutrition as appropriate  Outcome: Adequate for Discharge     Problem: Nutrition/Hydration-ADULT  Goal: Nutrient/Hydration intake appropriate for improving, restoring or maintaining nutritional needs  Description: Monitor and assess patient's nutrition/hydration status for malnutrition. Collaborate with interdisciplinary team and initiate plan and interventions as ordered.  Monitor patient's weight and dietary intake as ordered or per policy. Utilize nutrition screening tool and intervene as necessary. Determine patient's food preferences and provide high-protein, high-caloric foods as appropriate.     INTERVENTIONS:  - Monitor oral intake, urinary output, labs, and treatment plans  - Assess nutrition and hydration status and recommend course of action  - Evaluate amount of meals eaten  - Assist patient with eating if necessary   - Allow adequate time for meals  - Recommend/ encourage appropriate diets, oral nutritional supplements, and vitamin/mineral supplements  - Order, calculate, and assess calorie counts as needed  - Recommend, monitor, and adjust tube feedings and TPN/PPN based on assessed needs  - Assess need for intravenous fluids  - Provide specific nutrition/hydration education as appropriate  - Include patient/family/caregiver in decisions related to nutrition  Outcome: Adequate for Discharge
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
## 2024-10-02 NOTE — ASSESSMENT & PLAN NOTE
Lab Results   Component Value Date    HGBA1C 11.5 (H) 09/29/2024     Recent Labs     10/01/24  1042 10/01/24  1604 10/01/24  2225 10/02/24  0736   POCGLU 245* 211* 165* 153*     Blood Sugar Average: Last 72 hrs:  (P) [id number]  Uncontrolled. With elevated A1C to 11.5 and hyperglycemia. Significantly increases risk of development of infection and poor wound healing.  Recommend tight glycemic control  Management per primary team

## 2024-10-02 NOTE — ASSESSMENT & PLAN NOTE
E/b tachycardia and leukocytosis. Also with lactic acidosis. Reported 15d history of dysuria, frequency, and hematuria which she developed while she was on a cruise to [place]. Was treated with ciprofloxacin on the cruise by the on-board Physician. Reports compliance and initial improvement in hematuria, which then worsened prompting ED evaluation. CT A/P without any evidence of acute intra-abdominal abnormalities. UA obtained demonstrated innumerable WBC, large leukocytes, and large blood. Ucx positive for >100k E.coli. Course now further complicated by bacteremia as below.   Antibiotic as below  Continue to monitor temperature/vitals  Continue to follow CBCD and CMP with AM labs to monitor for potential antimicrobial toxicities and assess for clinical response to antibiotics.

## 2024-10-02 NOTE — DISCHARGE SUMMARY
"Discharge Summary - Hospitalist   Name: Korin Wilhelm 59 y.o. female I MRN: [id number]  Unit/Bed#: 421-01 I Date of Admission: 9/29/2024   Date of Service: 10/2/2024 I Hospital Day: 3     Assessment & Plan  Bacteremia due to Escherichia coli  Due to ESBL-producing Escherichia coli bacteremia with an ESBL-producing Escherichia coli UTI (urinary tract infection)  I appreciate Infectious Disease's input.  Treat with ertapenem 1000 mg IV every 24 hours (day #3 of a 7-day antibiotic treatment course) through 10/06/2024  The patient will go to the outpatient infusion center to complete her IV ertapenem course as an outpatient.  Outpatient follow-up with Infectious Disease  Sepsis (HCC)  Present on admission and due to ESBL-producing Escherichia coli bacteremia with an ESBL-producing Escherichia coli UTI (urinary tract infection)  Please see the assessment and plan for \"Bacteremia due to Escherichia coli\"  Type 2 diabetes mellitus without complication, without long-term current use of insulin (HCC)  Lab Results   Component Value Date    HGBA1C 11.5 (H) 09/29/2024     Blood Sugar Average: Last 72 hrs:  (P) [id number]    Worsening hyperglycemia due to the acute infection  Resume her home diabetic regimen upon discharge  She has an outpatient follow-up appointment with Dr. Kevin Merrill (Endocrinology) on 10/07/2024.  Primary hypertension  Continue her home anti-hypertensive regimen upon discharge  Outpatient follow-up with PCP in regards to this matter  Hypokalemia  Resolved with potassium chloride supplementation treatment during the hospitalization  Follow the potassium level and magnesium level after discharge with her PCP  Fatty liver  Outpatient follow-up with Gastroenterology  Diverticulosis of colon  Outpatient follow-up with Gastroenterology     Discharging Physician / Practitioner: Evert Thurman DO  PCP: Abbie Campos PA-C  Admission Date:   Admission Orders (From admission, onward)       Ordered    "     09/29/24 1433  Inpatient Admission  Once                          Discharge Date: 10/02/24    Consultations During Hospital Stay:  Infectious Disease    Procedures Performed:   None    Significant Findings / Test Results:   CT abdomen pelvis with contrast    Result Date: 9/29/2024  Impression: 1. No acute inflammatory or infectious process. 2. Diverticulosis coli without diverticulitis. Workstation performed: [id number]      Results from last 7 days   Lab Units 10/02/24  0456 10/01/24  0522 09/30/24  0543   WBC Thousand/uL 6.07 6.57 6.96   HEMOGLOBIN g/dL 12.3 13.0 13.4   HEMATOCRIT % 37.4 38.8 40.0   PLATELETS Thousands/uL 304 275 276     Results from last 7 days   Lab Units 10/02/24  0456 10/01/24  0522 09/30/24  0543   SODIUM mmol/L 140 137 136   POTASSIUM mmol/L 3.5 3.4* 3.5   CHLORIDE mmol/L 108 104 101   CO2 mmol/L 23 22 26   BUN mg/dL 7 8 11   CREATININE mg/dL 0.55* 0.63 0.72   CALCIUM mg/dL 8.5 8.4 8.1*     Results from last 7 days   Lab Units 10/02/24  0456 10/01/24  0522 09/30/24  0543   MAGNESIUM mg/dL 1.9 1.9 2.1     Results from last 7 days   Lab Units 10/02/24  0456 10/01/24  0522 09/30/24  0543 09/29/24  1136   ALK PHOS U/L 50 50 53 65   BILIRUBIN DIRECT mg/dL  --   --   --  0.12   ALT U/L 33 25 25 33   AST U/L 23 17 16 19     Microbiology Results (last 21 days)    Collected Updated Procedure Result Status Patient Facility Result Comment    09/29/2024 1334 10/02/2024 0101 Blood culture #2 [[id number]]   Blood from Hand, Right    Preliminary result [facility]  Component Value   Blood Culture No Growth at 48 hrs. P             09/29/2024 1334 10/02/2024 1435 Blood culture #1 [[id number]]    (Abnormal)   Blood from Arm, Right    Final result [facility]  Component Value   Blood Culture Escherichia coli ESBL Abnormal     An Extended-Spectrum Beta-Lactamase is being produced by this organism (requires contact precautions).  Cephalosporins are NOT effective  for treating these organisms.  For SEVERE infections (i.e. bacteremia, septic shock, etc.), Carbapenems are the drug of choice.  For MILD infections (i.e. isolated urinary or biliary infection), high-dose Zosyn may be used.  This organism has been edited. The previous result was Escherichia coli on 10/1/2024 at 1135 EDT.  If amikacin is required for treatment, contact Microbiology for susceptibilities.   Gram Stain Result Gram negative rods Abnormal     This is an appended report. These results have been appended to a previously preliminary verified report.       Susceptibility    Escherichia coli ESBL (1)    Antibiotic Interpretation Microscan  Method Status    Ampicillin ($$) Resistant >16.00 ug/ml MIESHA Final    Aztreonam ($$$) Resistant >16 ug/ml MIESHA Final    Cefazolin ($) Resistant >16.00 ug/ml MIESHA Final    Cefepime ($) Resistant >16.00 ug/ml MIESHA Final    Ceftazidime ($$) Resistant >16 ug/ml MIESHA Final    Ceftriaxone ($$) Resistant >32.00 ug/ml MIESHA Final    Cefuroxime ($$) Resistant >16 ug/ml MIESHA Final    Ciprofloxacin ($) Resistant >2.00 ug/ml MIESHA Final    Ertapenem ($$$) Susceptible <=0.5 ug/ml MIESHA Final    Gentamicin ($$) Resistant >8 ug/ml MIESHA Final    Levofloxacin ($) Resistant >4.00 ug/ml MIESHA Final    Minocycline Susceptible <=4 ug/ml MIESHA Final    Piperacillin + Tazobactam ($$$) Susceptible <=8 ug/ml MIESHA Final    Tetracycline Resistant >8 ug/ml MIESHA Final    Trimethoprim + Sulfamethoxazole ($$$) Resistant >2/38 ug/ml MIESHA Final          09/29/2024 1334 10/02/2024 1435 Blood Culture Identification Panel [[id number]]    (Abnormal)   Blood from Arm, Right    Final result [facility] Routine culture and susceptiblity to follow for confirmation.   Film Array panel tests for 11 gram positive organisms, 15 gram negative organisms, 7 yeast species and 10 resistance genes.    Component Value   Escherichia coli Detected Abnormal    CTX-M (ESBL RESISTANCE GENE) Detected Abnormal     Probable ESBL;  ertapenem recommended empirically    Patient requires contact precautions if hospitalized    Consider infectious diseases consult if critically ill             09/29/2024 1136 09/30/2024 2000 Urine culture [[id number]]   (Abnormal)   Urine, Clean Catch    Preliminary result [facility]  Component Value   Urine Culture >100,000 cfu/ml Escherichia coli Abnormal  P    This organism has been edited. The previous result was Gram Negative Juan C Enteric Like on 9/30/2024 at 1728 EDT.                 Incidental Findings:   As above    Test Results Pending at Discharge (will require follow-up):  None     Outpatient Tests Requested:  CBC with differential, CMP, Magnesium level, Urinalysis, and urine culture in 5 days    Complications:  None    Reason for Admission:  Sepsis    Hospital Course:   Korin Wilhelm is a 59 y.o. female patient who originally presented to the hospital on 9/29/2024 due to dysuria and hematuria.    Please see above list of diagnoses and related plan for additional information.     Condition at Discharge: good    Discharge Day Visit / Exam:   General:  NAD, follows commands  HEENT:  NC/AT, mucous membranes moist  Neck:  Supple, No JVP elevation  CV:  + S1, + S2, RRR  Pulm:  Lung fields are CTA bilaterally  Abd:  Soft, Non-tender, Non-distended  Ext:  No clubbing/cyanosis/edema  Skin:  No rashes  Neuro:  Awake, alert, oriented  Psych:  Normal mood and affect      Discussion with Family: Patient declined call to .     Discharge instructions/Information to patient and family:  See after visit summary for information provided to patient and family.      Provisions for Follow-Up Care:  See after visit summary for information related to follow-up care and any pertinent home health orders.      Mobility at time of Discharge:   Basic Mobility Inpatient Raw Score: 24  JH-HLM Goal: 8: Walk 250 feet or more  JH-HLM Achieved: 8: Walk 250 feet ot more  HLM Goal achieved.  Continue to encourage appropriate mobility.     Disposition:   Home    Discharge Medications:  See after visit summary for reconciled discharge medications provided to patient and/or family.      Administrative Statements   Discharge Statement:  I have spent a total time of 45 minutes in caring for this patient on the day of the visit/encounter. >30 minutes of time was spent on: Diagnostic results, Prognosis, Risks and benefits of tx options, Instructions for management, and Patient and family education.    **Please Note: This note may have been constructed using a voice recognition system**

## 2024-10-02 NOTE — ASSESSMENT & PLAN NOTE
Due to ESBL-producing Escherichia coli bacteremia with an ESBL-producing Escherichia coli UTI (urinary tract infection)  I appreciate Infectious Disease's input.  Treat with ertapenem 1000 mg IV every 24 hours (day #3 of a 7-day antibiotic treatment course) through 10/06/2024  The patient will go to the outpatient infusion center to complete her IV ertapenem course as an outpatient.  Outpatient follow-up with Infectious Disease

## 2024-10-02 NOTE — PROGRESS NOTES
Progress Note - Infectious Disease   Name: Korin Wilhelm 59 y.o. female I MRN: [id number]  Unit/Bed#: 421-01 I Date of Admission: 9/29/2024   Date of Service: 10/2/2024 I Hospital Day: 3      REQUIRED DOCUMENTATION:     1. This service was provided via Telemedicine.  2. Provider located at [facility].  3. TeleMed provider: Janeth Miranda PA-C  4. Identify all parties in room with patient during tele consult: Patient, PA-C  5. After connecting through [employer], patient was identified by name and date of birth and assistant checked wristband.  Patient was then informed that this was a Telemedicine visit and that the exam was being conducted confidentially over secure lines. My office door was closed. No one else was in the room.  Patient acknowledged consent and understanding of privacy and security of the Telemedicine visit, and gave us permission to have the assistant stay in the room in order to assist with the history and to conduct the exam.  I informed the patient that I have reviewed their record in Epic and presented the opportunity for them to ask any questions regarding the visit today.  The patient agreed to participate.     Assessment & Plan  Sepsis secondary to UTI  (HCC)  E/b tachycardia and leukocytosis. Also with lactic acidosis. Reported 15d history of dysuria, frequency, and hematuria which she developed while she was on a cruise to [place]. Was treated with ciprofloxacin on the cruise by the on-board Physician. Reports compliance and initial improvement in hematuria, which then worsened prompting ED evaluation. CT A/P without any evidence of acute intra-abdominal abnormalities. UA obtained demonstrated innumerable WBC, large leukocytes, and large blood. Ucx positive for >100k E.coli. Course now further complicated by bacteremia as below.   Antibiotic as below  Continue to monitor temperature/vitals  Continue to follow CBCD and CMP with AM labs to monitor for potential  antimicrobial toxicities and assess for clinical response to antibiotics.   Bacteremia due to Escherichia coli  1 out of 2 blood cultures on admission positive for E.coli. ESBL resistance gene detected. Most likely 2/2 to urosepsis as above. CT A/P did not show any evidence of intra-abdominal abnormality. Switched from IV ceftriaxone to IV ertapenem given presence of ESBL resistance gene. Based on sensitivities, there are not any PO options. Patient will have to complete her course with IV ertapenem.   Continue IV ertapenem 1g daily  Plan for 7d total antibiotic course (through 10/6)  Patient to go to the infusion center daily and get daily PIVs for antibiotics through 10/6. [place] orders placed.   Type 2 diabetes mellitus without complication, without long-term current use of insulin ([facility])  Lab Results   Component Value Date    HGBA1C 11.5 (H) 09/29/2024     Recent Labs     10/01/24  1042 10/01/24  1604 10/01/24  2225 10/02/24  0736   POCGLU 245* 211* 165* 153*     Blood Sugar Average: Last 72 hrs:  (P) 219.1717568349452656  Uncontrolled. With elevated A1C to 11.5 and hyperglycemia. Significantly increases risk of development of infection and poor wound healing.  Recommend tight glycemic control  Management per primary team     Ok for discharge from ID standpoint.  Plan discussed with patient via telemedicine Tvkit  Plan discussed with primary team attending who agrees the above plan.      History of Present Illness   Feeling well, no complaints. No fevers, chills, N/V/D, CP, SOB, abdominal pain. Urinary symptoms resolved. Tolerating IV ertapenem without difficulty. Eager to go home.    Objective      Temp:  [97.9 °F (36.6 °C)-98.2 °F (36.8 °C)] 98.2 °F (36.8 °C)  HR:  [80-91] 91  BP: (129-156)/(80-85) 156/85  Resp:  [17-18] 17  SpO2:  [97 %-99 %] 98 %  O2 Device: None (Room air)  O2 Device: None (Room air)          I/O last 24 hours:  In: 4934.2 [P.O.:900; I.V.:4034.2]  Out: -       Physical Exam     Physical exam  findings reported by bedside and primary medical team staff, also observed over TV kit:   General Appearance:  Alert, interactive, nontoxic, no acute distress. Sitting upright in chair next to bed, appears well.    Throat: Oropharynx moist without lesions.    Lungs:   Clear to auscultation bilaterally; no wheezes, rhonchi or rales; respirations unlabored. On room air.    Heart:  RRR; no murmur, rub or gallop.   Abdomen:   Soft, non-tender, non-distended, positive bowel sounds. No CVA tenderness.    Extremities: No clubbing or cyanosis, no edema.   Skin: No new rashes, lesions, or draining wounds noted on exposed skin.        Lab Results: I have reviewed the following results:   Recent Labs     09/29/24  1334 09/29/24  1408 09/30/24  0543 10/01/24  0522 10/02/24  0456   WBC  --   --  6.96 6.57 6.07   HGB  --   --  13.4 13.0 12.3   HCT  --   --  40.0 38.8 37.4   PLT  --    < > 276 275 304   SODIUM  --   --  136 137 140   K  --   --  3.5 3.4* 3.5   CL  --   --  101 104 108   CO2  --   --  26 22 23   BUN  --   --  11 8 7   CREATININE  --   --  0.72 0.63 0.55*   GLUC  --   --  244* 203* 166*   CAIONIZED  --   --   --  1.11*  --    MG  --   --  2.1 1.9 1.9   PHOS  --    < > 2.6 2.6 2.8   AST  --   --  16 17 23   ALT  --   --  25 25 33   ALB  --   --  3.9 3.7 3.5   TBILI  --   --  0.73 0.46 0.38   ALKPHOS  --   --  53 50 50   INR 0.95  --   --   --   --    LACTICACID  --    < > 1.0  --   --     < > = values in this interval not displayed.     Micro:  Lab Results   Component Value Date    BLOODCX No Growth at 48 hrs. 09/29/2024    BLOODCX Escherichia coli (A) 09/29/2024    URINECX >100,000 cfu/ml Escherichia coli (A) 09/29/2024    URINECX 10,000-19,000 cfu/ml 07/20/2023    URINECX 20,000-29,000 cfu/ml 10/21/2022     Imaging Review: Personally reviewed the following image studies in PACS and associated radiology reports: CT abdomen/pelvis. My interpretation of the radiology images/reports is: No acute intra-abdominal  process..  Other Studies: No additional pertinent studies reviewed.      Janeth Miranda PA-C  [facility]

## 2024-10-02 NOTE — ASSESSMENT & PLAN NOTE
1 out of 2 blood cultures on admission positive for E.coli. ESBL resistance gene detected. Most likely 2/2 to urosepsis as above. CT A/P did not show any evidence of intra-abdominal abnormality. Switched from IV ceftriaxone to IV ertapenem given presence of ESBL resistance gene. Based on sensitivities, there are not any PO options. Patient will have to complete her course with IV ertapenem.   Continue IV ertapenem 1g daily  Plan for 7d total antibiotic course (through 10/6)  Patient to go to the infusion center daily and get daily PIVs for antibiotics through 10/6. [place] orders placed.

## 2024-10-02 NOTE — CASE MANAGEMENT
Case Management Discharge Planning Note    Patient name Korin Wilhelm  Location /421-01 MRN [id number]  : 1965 Date 10/2/2024       Current Admission Date: 2024  Current Admission Diagnosis:Sepsis secondary to UTI  (HCC)   Patient Active Problem List    Diagnosis Date Noted Date Diagnosed    Hypokalemia 10/01/2024     Bacteremia due to Escherichia coli 2024     Sepsis secondary to UTI  (HCC) 2024     Assessment examination refused 2024     Anxiety 2024     Diabetic retinopathy associated with type 2 diabetes mellitus, macular edema presence unspecified, unspecified laterality, unspecified retinopathy severity (HCC) 2024     Mixed hyperlipidemia 09/15/2023     Allergic rhinitis 09/15/2023     Refused influenza vaccine 09/15/2023     Coronary artery calcification seen on CAT scan 2022     Primary hypertension 10/22/2022     Myelolipoma of right adrenal gland 10/21/2022     Hepatomegaly 10/21/2022     Fatty liver 10/21/2022     Type 2 diabetes mellitus without complication, without long-term current use of insulin ([facility])        LOS (days): 3  Geometric Mean LOS (GMLOS) (days): 4.9  Days to GMLOS:2     OBJECTIVE:  Risk of Unplanned Readmission Score: 15.14         Current admission status: Inpatient   Preferred Pharmacy:   [id number] - [place] - [address] [address]  Phone: [phone] Fax: [phone]    Primary Care Provider: Abbie Campos PA-C    Primary Insurance: BLUE CROSS  Secondary Insurance:     DISCHARGE DETAILS:    Discharge planning discussed with:: patient     Comments - Freedom of Choice: Patient wants to go to out patient Infusion clinic daily for rest of IV antibiotics.  Schedule set up for [facility] and [facility]  CM contacted family/caregiver?: No- see comments (patient aware)  Were Treatment Team discharge recommendations reviewed with patient/caregiver?: Yes  Did  patient/caregiver verbalize understanding of patient care needs?: Yes  Were patient/caregiver advised of the risks associated with not following Treatment Team discharge recommendations?: Yes    Contacts  Patient Contacts: see faces sheet  Relationship to Patient:: Family    Requested Home Health Care         Is the patient interested in C at discharge?: No      Treatment Team Recommendation: Home  Discharge Destination Plan:: Home (Out patient infusion appts made in [facility] and [facility])  Transport at Discharge : Family              Patient stable for discharge home today with out patient follow up providers.     CM spoke with [facility] scheduling at [employer]. Following appts  made for Patient out patient IV antibiotic therapy.     10/3/24 Thurs [facility] at 12:30pm    10/4/24 Friday at [facility] for 0830 am     10/5/24 Saturday at [facility] for 0800 am     10/6/24 Gabe at [facility] for 0800 am.       With arrival 15 minutes prior to chair time.    Patient aware of same and in agrement.    Follow up providers listed on AVS    Family to transport patient home.

## 2024-10-02 NOTE — ASSESSMENT & PLAN NOTE
Lab Results   Component Value Date    HGBA1C 11.5 (H) 09/29/2024     Blood Sugar Average: Last 72 hrs:  (P) [id number]    Worsening hyperglycemia due to the acute infection  Resume her home diabetic regimen upon discharge  She has an outpatient follow-up appointment with Dr. Kevin Merrill (Endocrinology) on 10/07/2024.

## 2024-10-03 ENCOUNTER — HOSPITAL ENCOUNTER (OUTPATIENT)
Dept: INFUSION CENTER | Facility: HOSPITAL | Age: 59
End: 2024-10-03
Attending: STUDENT IN AN ORGANIZED HEALTH CARE EDUCATION/TRAINING PROGRAM
Payer: COMMERCIAL

## 2024-10-03 DIAGNOSIS — B96.20 BACTEREMIA DUE TO ESCHERICHIA COLI: Primary | ICD-10-CM

## 2024-10-03 DIAGNOSIS — R78.81 BACTEREMIA DUE TO ESCHERICHIA COLI: Primary | ICD-10-CM

## 2024-10-03 PROCEDURE — 96365 THER/PROPH/DIAG IV INF INIT: CPT

## 2024-10-03 RX ORDER — SODIUM CHLORIDE 9 MG/ML
20 INJECTION, SOLUTION INTRAVENOUS ONCE
Status: CANCELLED | OUTPATIENT
Start: 2024-10-04

## 2024-10-03 RX ORDER — SODIUM CHLORIDE 9 MG/ML
20 INJECTION, SOLUTION INTRAVENOUS ONCE
Status: COMPLETED | OUTPATIENT
Start: 2024-10-03 | End: 2024-10-03

## 2024-10-03 RX ADMIN — SODIUM CHLORIDE 20 ML/HR: 0.9 INJECTION, SOLUTION INTRAVENOUS at 12:35

## 2024-10-03 RX ADMIN — ERTAPENEM SODIUM 1000 MG: 1 INJECTION, POWDER, LYOPHILIZED, FOR SOLUTION INTRAMUSCULAR; INTRAVENOUS at 13:13

## 2024-10-03 NOTE — UTILIZATION REVIEW
NOTIFICATION OF ADMISSION DISCHARGE   This is a Notification of Discharge from [facility]. Please be advised that this patient has been discharge from our facility. Below you will find the admission and discharge date and time including the patient’s disposition.   UTILIZATION REVIEW CONTACT:  Robin Doran  Utilization   Network Utilization Review Department  Phone: [phone] x carefully listen to the prompts. All voicemails are confidential.  Email: [email or web address]     ADMISSION INFORMATION  PRESENTATION DATE: 9/29/2024 11:12 AM  OBERVATION ADMISSION DATE: N/A  INPATIENT ADMISSION DATE: 9/29/24  2:33 PM   DISCHARGE DATE: 10/2/2024  1:58 PM   DISPOSITION:Home/Self Care    Network Utilization Review Department  ATTENTION: Please call with any questions or concerns to [phone] and carefully listen to the prompts so that you are directed to the right person. All voicemails are confidential.   For Discharge needs, contact Care Management DC Support Team at [phone] opt. 2  Send all requests for admission clinical reviews, approved or denied determinations and any other requests to dedicated fax number below belonging to the campus where the patient is receiving treatment. List of dedicated fax numbers for the Facilities:  FACILITY NAME UR FAX NUMBER   ADMISSION DENIALS (Administrative/Medical Necessity) [phone]   DISCHARGE SUPPORT TEAM ([facility]) [phone]   PARENT CHILD HEALTH (Maternity/NICU/Pediatrics) [phone]   [facility] [phone]   [facility] [phone]   [facility] [phone]   [facility] [phone]   [facility] [phone]   [facility] [phone]   [facility] [phone]   [facility] [phone]   [facility]  [facility] [phone]   [facility] [phone]   [facility] [phone]   [facility] [phone]

## 2024-10-03 NOTE — PROGRESS NOTES
Korin Wilhelm  tolerated Invanz treatment well with no complications.      Korin Wilhelm is aware of future appt on 10/4 at 8:30AM.     AVS printed and given to Korin Wilhelm: No (Declined by Korin Wilhelm).

## 2024-10-04 ENCOUNTER — HOSPITAL ENCOUNTER (OUTPATIENT)
Dept: INFUSION CENTER | Facility: HOSPITAL | Age: 59
End: 2024-10-04
Attending: STUDENT IN AN ORGANIZED HEALTH CARE EDUCATION/TRAINING PROGRAM
Payer: COMMERCIAL

## 2024-10-04 VITALS
RESPIRATION RATE: 18 BRPM | OXYGEN SATURATION: 98 % | TEMPERATURE: 96.5 F | SYSTOLIC BLOOD PRESSURE: 153 MMHG | HEART RATE: 101 BPM | DIASTOLIC BLOOD PRESSURE: 71 MMHG

## 2024-10-04 DIAGNOSIS — B96.20 BACTEREMIA DUE TO ESCHERICHIA COLI: Primary | ICD-10-CM

## 2024-10-04 DIAGNOSIS — R78.81 BACTEREMIA DUE TO ESCHERICHIA COLI: Primary | ICD-10-CM

## 2024-10-04 PROCEDURE — 96365 THER/PROPH/DIAG IV INF INIT: CPT

## 2024-10-04 RX ORDER — SODIUM CHLORIDE 9 MG/ML
20 INJECTION, SOLUTION INTRAVENOUS ONCE
Status: COMPLETED | OUTPATIENT
Start: 2024-10-04 | End: 2024-10-04

## 2024-10-04 RX ORDER — SODIUM CHLORIDE 9 MG/ML
20 INJECTION, SOLUTION INTRAVENOUS ONCE
Status: CANCELLED | OUTPATIENT
Start: 2024-10-05

## 2024-10-04 RX ADMIN — ERTAPENEM SODIUM 1000 MG: 1 INJECTION, POWDER, LYOPHILIZED, FOR SOLUTION INTRAMUSCULAR; INTRAVENOUS at 08:46

## 2024-10-04 RX ADMIN — SODIUM CHLORIDE 20 ML/HR: 0.9 INJECTION, SOLUTION INTRAVENOUS at 08:47

## 2024-10-04 NOTE — PROGRESS NOTES
Korinmoses Wilhelm tolerated IV Invanz well with no complications.      Korin Wilhelm is aware of future appt on 10/05/24 at 0800 at [facility] infusion    AVS declined.

## 2024-10-05 ENCOUNTER — HOSPITAL ENCOUNTER (OUTPATIENT)
Dept: INFUSION CENTER | Facility: HOSPITAL | Age: 59
Discharge: HOME | End: 2024-10-05
Attending: STUDENT IN AN ORGANIZED HEALTH CARE EDUCATION/TRAINING PROGRAM
Payer: COMMERCIAL

## 2024-10-05 VITALS
HEART RATE: 69 BPM | SYSTOLIC BLOOD PRESSURE: 115 MMHG | TEMPERATURE: 97.2 F | DIASTOLIC BLOOD PRESSURE: 78 MMHG | RESPIRATION RATE: 18 BRPM

## 2024-10-05 DIAGNOSIS — R78.81 BACTEREMIA DUE TO ESCHERICHIA COLI: Primary | ICD-10-CM

## 2024-10-05 DIAGNOSIS — B96.20 BACTEREMIA DUE TO ESCHERICHIA COLI: Primary | ICD-10-CM

## 2024-10-05 LAB — BACTERIA BLD CULT: NORMAL

## 2024-10-05 PROCEDURE — 96365 THER/PROPH/DIAG IV INF INIT: CPT

## 2024-10-05 RX ORDER — SODIUM CHLORIDE 9 MG/ML
20 INJECTION, SOLUTION INTRAVENOUS ONCE
Status: COMPLETED | OUTPATIENT
Start: 2024-10-05 | End: 2024-10-05

## 2024-10-05 RX ORDER — SODIUM CHLORIDE 9 MG/ML
20 INJECTION, SOLUTION INTRAVENOUS ONCE
Status: CANCELLED | OUTPATIENT
Start: 2024-10-06

## 2024-10-05 RX ADMIN — Medication 1000 MG: at 07:43

## 2024-10-05 RX ADMIN — SODIUM CHLORIDE 20 ML/HR: 0.9 INJECTION, SOLUTION INTRAVENOUS at 07:43

## 2024-10-05 NOTE — PROGRESS NOTES
Korin Wilhelm tolerated IV antibiotic well with no complications.      Korin Wilhelm is aware of future appt tomorrow at 8AM    AVS declined by Korin Wilhelm.

## 2024-10-06 ENCOUNTER — HOSPITAL ENCOUNTER (OUTPATIENT)
Dept: INFUSION CENTER | Facility: HOSPITAL | Age: 59
Discharge: HOME | End: 2024-10-06
Attending: STUDENT IN AN ORGANIZED HEALTH CARE EDUCATION/TRAINING PROGRAM
Payer: COMMERCIAL

## 2024-10-06 VITALS
RESPIRATION RATE: 18 BRPM | HEART RATE: 69 BPM | TEMPERATURE: 97.4 F | SYSTOLIC BLOOD PRESSURE: 133 MMHG | DIASTOLIC BLOOD PRESSURE: 81 MMHG

## 2024-10-06 DIAGNOSIS — R78.81 BACTEREMIA DUE TO ESCHERICHIA COLI: Primary | ICD-10-CM

## 2024-10-06 DIAGNOSIS — B96.20 BACTEREMIA DUE TO ESCHERICHIA COLI: Primary | ICD-10-CM

## 2024-10-06 PROCEDURE — 96365 THER/PROPH/DIAG IV INF INIT: CPT

## 2024-10-06 RX ORDER — SODIUM CHLORIDE 9 MG/ML
20 INJECTION, SOLUTION INTRAVENOUS ONCE
Status: CANCELLED | OUTPATIENT
Start: 2024-10-06

## 2024-10-06 RX ORDER — SODIUM CHLORIDE 9 MG/ML
20 INJECTION, SOLUTION INTRAVENOUS ONCE
Status: COMPLETED | OUTPATIENT
Start: 2024-10-06 | End: 2024-10-06

## 2024-10-06 RX ADMIN — Medication 1000 MG: at 07:42

## 2024-10-06 RX ADMIN — SODIUM CHLORIDE 20 ML/HR: 0.9 INJECTION, SOLUTION INTRAVENOUS at 07:42

## 2024-10-06 NOTE — PROGRESS NOTES
Korin Wilhelm tolerated IV antibiotic well with no complications.    Korin Wilhelm is aware of no further infusion appointments at this time.    AVS declined by Korin Wilhelm:.

## 2024-10-07 ENCOUNTER — TELEPHONE (OUTPATIENT)
Dept: FAMILY MEDICINE CLINIC | Facility: CLINIC | Age: 59
End: 2024-10-07

## 2024-10-07 ENCOUNTER — OFFICE VISIT (OUTPATIENT)
Dept: ENDOCRINOLOGY | Facility: CLINIC | Age: 59
End: 2024-10-07
Payer: COMMERCIAL

## 2024-10-07 ENCOUNTER — APPOINTMENT (OUTPATIENT)
Dept: LAB | Facility: HOSPITAL | Age: 59
End: 2024-10-07
Payer: COMMERCIAL

## 2024-10-07 VITALS
OXYGEN SATURATION: 99 % | SYSTOLIC BLOOD PRESSURE: 154 MMHG | HEART RATE: 98 BPM | HEIGHT: 71 IN | TEMPERATURE: 97.8 F | WEIGHT: 211 LBS | BODY MASS INDEX: 29.54 KG/M2 | DIASTOLIC BLOOD PRESSURE: 96 MMHG

## 2024-10-07 DIAGNOSIS — I10 PRIMARY HYPERTENSION: ICD-10-CM

## 2024-10-07 DIAGNOSIS — D75.839 THROMBOCYTOSIS: ICD-10-CM

## 2024-10-07 DIAGNOSIS — R78.81 BACTEREMIA DUE TO ESCHERICHIA COLI: ICD-10-CM

## 2024-10-07 DIAGNOSIS — B96.20 BACTEREMIA DUE TO ESCHERICHIA COLI: ICD-10-CM

## 2024-10-07 DIAGNOSIS — N39.0 SEPSIS SECONDARY TO UTI  (HCC): ICD-10-CM

## 2024-10-07 DIAGNOSIS — E11.9 TYPE 2 DIABETES MELLITUS WITHOUT COMPLICATION, WITHOUT LONG-TERM CURRENT USE OF INSULIN (HCC): ICD-10-CM

## 2024-10-07 DIAGNOSIS — E11.65 TYPE 2 DIABETES MELLITUS WITH HYPERGLYCEMIA, WITHOUT LONG-TERM CURRENT USE OF INSULIN (HCC): Primary | ICD-10-CM

## 2024-10-07 DIAGNOSIS — E87.6 HYPOKALEMIA: ICD-10-CM

## 2024-10-07 DIAGNOSIS — R78.81 BACTEREMIA: ICD-10-CM

## 2024-10-07 DIAGNOSIS — A41.9 SEPSIS SECONDARY TO UTI  (HCC): ICD-10-CM

## 2024-10-07 DIAGNOSIS — K76.0 HEPATIC STEATOSIS: ICD-10-CM

## 2024-10-07 DIAGNOSIS — E78.2 MIXED HYPERLIPIDEMIA: ICD-10-CM

## 2024-10-07 LAB
ALBUMIN SERPL BCG-MCNC: 4.1 G/DL (ref 3.5–5)
ALP SERPL-CCNC: 59 U/L (ref 34–104)
ALT SERPL W P-5'-P-CCNC: 45 U/L (ref 7–52)
ANION GAP SERPL CALCULATED.3IONS-SCNC: 9 MMOL/L (ref 4–13)
AST SERPL W P-5'-P-CCNC: 22 U/L (ref 13–39)
BACTERIA UR QL AUTO: ABNORMAL /HPF
BASOPHILS # BLD AUTO: 0.07 THOUSANDS/ΜL (ref 0–0.1)
BASOPHILS NFR BLD AUTO: 1 % (ref 0–1)
BILIRUB SERPL-MCNC: 0.58 MG/DL (ref 0.2–1)
BILIRUB UR QL STRIP: NEGATIVE
BUN SERPL-MCNC: 15 MG/DL (ref 5–25)
CALCIUM SERPL-MCNC: 9.2 MG/DL (ref 8.4–10.2)
CHLORIDE SERPL-SCNC: 100 MMOL/L (ref 96–108)
CLARITY UR: CLEAR
CO2 SERPL-SCNC: 27 MMOL/L (ref 21–32)
COLOR UR: ABNORMAL
CREAT SERPL-MCNC: 0.78 MG/DL (ref 0.6–1.3)
EOSINOPHIL # BLD AUTO: 0.22 THOUSAND/ΜL (ref 0–0.61)
EOSINOPHIL NFR BLD AUTO: 3 % (ref 0–6)
ERYTHROCYTE [DISTWIDTH] IN BLOOD BY AUTOMATED COUNT: 12.5 % (ref 11.6–15.1)
GFR SERPL CREATININE-BSD FRML MDRD: 82 ML/MIN/1.73SQ M
GLUCOSE P FAST SERPL-MCNC: 213 MG/DL (ref 65–99)
GLUCOSE UR STRIP-MCNC: NEGATIVE MG/DL
HCT VFR BLD AUTO: 41.1 % (ref 34.8–46.1)
HGB BLD-MCNC: 14 G/DL (ref 11.5–15.4)
HGB UR QL STRIP.AUTO: NEGATIVE
IMM GRANULOCYTES # BLD AUTO: 0.04 THOUSAND/UL (ref 0–0.2)
IMM GRANULOCYTES NFR BLD AUTO: 1 % (ref 0–2)
KETONES UR STRIP-MCNC: NEGATIVE MG/DL
LEUKOCYTE ESTERASE UR QL STRIP: NEGATIVE
LYMPHOCYTES # BLD AUTO: 1.54 THOUSANDS/ΜL (ref 0.6–4.47)
LYMPHOCYTES NFR BLD AUTO: 19 % (ref 14–44)
MAGNESIUM SERPL-MCNC: 1.6 MG/DL (ref 1.9–2.7)
MCH RBC QN AUTO: 32 PG (ref 26.8–34.3)
MCHC RBC AUTO-ENTMCNC: 34.1 G/DL (ref 31.4–37.4)
MCV RBC AUTO: 94 FL (ref 82–98)
MONOCYTES # BLD AUTO: 1.12 THOUSAND/ΜL (ref 0.17–1.22)
MONOCYTES NFR BLD AUTO: 14 % (ref 4–12)
NEUTROPHILS # BLD AUTO: 5.24 THOUSANDS/ΜL (ref 1.85–7.62)
NEUTS SEG NFR BLD AUTO: 62 % (ref 43–75)
NITRITE UR QL STRIP: NEGATIVE
NON-SQ EPI CELLS URNS QL MICRO: ABNORMAL /HPF
NRBC BLD AUTO-RTO: 0 /100 WBCS
PH UR STRIP.AUTO: 5.5 [PH]
PLATELET # BLD AUTO: 450 THOUSANDS/UL (ref 149–390)
PMV BLD AUTO: 9.7 FL (ref 8.9–12.7)
POTASSIUM SERPL-SCNC: 3.9 MMOL/L (ref 3.5–5.3)
PROT SERPL-MCNC: 6.8 G/DL (ref 6.4–8.4)
PROT UR STRIP-MCNC: NEGATIVE MG/DL
RBC # BLD AUTO: 4.38 MILLION/UL (ref 3.81–5.12)
RBC #/AREA URNS AUTO: ABNORMAL /HPF
SODIUM SERPL-SCNC: 136 MMOL/L (ref 135–147)
SP GR UR STRIP.AUTO: 1.01 (ref 1–1.03)
TRANS CELLS #/AREA URNS HPF: ABNORMAL /[HPF]
UROBILINOGEN UR STRIP-ACNC: <2 MG/DL
WBC # BLD AUTO: 8.23 THOUSAND/UL (ref 4.31–10.16)
WBC #/AREA URNS AUTO: ABNORMAL /HPF

## 2024-10-07 PROCEDURE — 85025 COMPLETE CBC W/AUTO DIFF WBC: CPT

## 2024-10-07 PROCEDURE — 80053 COMPREHEN METABOLIC PANEL: CPT

## 2024-10-07 PROCEDURE — 81001 URINALYSIS AUTO W/SCOPE: CPT

## 2024-10-07 PROCEDURE — 87086 URINE CULTURE/COLONY COUNT: CPT

## 2024-10-07 PROCEDURE — 83735 ASSAY OF MAGNESIUM: CPT

## 2024-10-07 PROCEDURE — 99214 OFFICE O/P EST MOD 30 MIN: CPT | Performed by: STUDENT IN AN ORGANIZED HEALTH CARE EDUCATION/TRAINING PROGRAM

## 2024-10-07 PROCEDURE — 36415 COLL VENOUS BLD VENIPUNCTURE: CPT

## 2024-10-07 RX ORDER — KETOROLAC TROMETHAMINE 30 MG/ML
INJECTION, SOLUTION INTRAMUSCULAR; INTRAVENOUS
Qty: 1 EACH | Refills: 0 | Status: SHIPPED | OUTPATIENT
Start: 2024-10-07

## 2024-10-07 RX ORDER — BLOOD-GLUCOSE SENSOR
EACH MISCELLANEOUS
Qty: 2 EACH | Refills: 3 | Status: SHIPPED | OUTPATIENT
Start: 2024-10-07

## 2024-10-07 NOTE — TELEPHONE ENCOUNTER
Nurse advocate from [employer] ..[place] @ [phone] ext [phone].. faxing document for tcm to be filled out as pt refused tcm and is keeping 10/29 appt

## 2024-10-07 NOTE — PROGRESS NOTES
Ambulatory Visit  Name: Korin Wilhelm      : 1965      MRN: [id number]  Encounter Provider: Kevin Villafana DO  Encounter Date: 10/7/2024   Encounter department: [facility]    Assessment & Plan  Type 2 diabetes mellitus with hyperglycemia, without long-term current use of insulin (HCC)  Worsening control. Escalation of therapy to be pursued. While we discussed insulin, Korin wishes for alternate option. She had prior brief experience with mounjaro, limited due to mild injection site reaction. She wishes to explore this treatment over trulicity. This is reasonable. We discussed concepts such as low carb, healthy fat diet and intermittent fasting/ASH. I am recommending intensification of BG monitoring. Pt could benefit from CGM. Rx provided. Contact office if initiating or if in need of assistance. Will plan reassessment in 3-mo with labs. If control has not improved substantially (GMI/A1c <8-9%), may which to pursue additional pharmacologic escalation, such as introduction of basal insulin at 0.15 - 0.2 u/kg per day, Pt has had hx euglycemic dka with SGLT2i  Lab Results   Component Value Date    HGBA1C 11.5 (H) 2024       Orders:    tirzepatide (Mounjaro) 5 MG/0.5ML; Inject 0.5 mL (5 mg total) under the skin every 7 days    Hemoglobin A1C; Future    Basic metabolic panel; Future    Continuous Glucose Sensor (FreeStyle Sallie 3 Plus Sensor) MISC; E11.65 replace every 15 days    Continuous Glucose  (FreeStyle Sallie 3 [place]) ALEXX; E11.65 for use with sallie 3 sensor    Primary hypertension  Above goal. Will follow       Mixed hyperlipidemia  Patient was on lipitor 20 mg daily. Will have to reassess to confirm Rx still active       Hepatic steatosis  Could benefit from reduction/elimination of refined carbs from diet       Thrombocytosis  Pt mentions concern over high platelets. I expect this is likely reactive due to recent infection and  systemic antibiotics. Will plan reassessment in 2-weeks  Orders:    CBC and differential; Future    RTC 3-mo    History of Present Illness     Korin Wilhelm is a 59 y.o. female who presents in follow up of T2D. There is recent hx of hospitalization for  sepsis with Ecoli. She notes worsening hyperglycemia since. Diagnosis of UTI made following recent cruise trip. She is presently on metformin and trulicity. She was on mounjaro, but experienced repeat injection site reactions. There is history of euglycemic DKA on jardiance. She has reservations about starting insulin.     History obtained from : patient  Review of Systems   Constitutional:  Negative for diaphoresis and unexpected weight change.   Endocrine: Negative for polydipsia and polyuria.   All other systems reviewed and are negative.    Medical History Reviewed by provider this encounter:       Current Outpatient Medications on File Prior to Visit   Medication Sig Dispense Refill    acetaminophen (TYLENOL) 325 mg tablet Take 2 tablets (650 mg total) by mouth every 6 (six) hours as needed for mild pain .jefftyul      amLODIPine (NORVASC) 5 mg tablet Take 1 tablet (5 mg total) by mouth daily 90 tablet 1    Aspirin Low Dose 81 MG EC tablet TAKE ONE TABLET BY MOUTH EVERY DAY 90 tablet 1    ertapenem 1,000 mg in sodium chloride 0.9 % 50 mL IVPB Inject 1,000 mg into a catheter in a vein over 30 minutes at 100 mL/hr every 24 hours for 4 days Do not start before October 3, 2024.      escitalopram (LEXAPRO) 5 mg tablet TAKE ONE TABLET BY MOUTH EVERY DAY 30 tablet 5    famotidine (PEPCID) 40 MG tablet Take 0.5 tablets (20 mg total) by mouth 2 (two) times a day      fluticasone (FLONASE) 50 mcg/act nasal spray 2 sprays into each nostril daily 16 g 11    folic acid (FOLVITE) 800 MCG tablet Take 400 mcg by mouth daily      glucose blood (Accu-Chek Guide) test strip Use as instructed 100 strip 3    ipratropium (ATROVENT) 0.03 % nasal spray 2 sprays into each nostril  "3 (three) times a day 30 mL 3    metFORMIN (GLUCOPHAGE) 1000 MG tablet Take 1 tablet (1,000 mg total) by mouth 2 (two) times a day with meals 180 tablet 1    metoprolol succinate (TOPROL-XL) 25 mg 24 hr tablet Take 1 tablet (25 mg total) by mouth daily 90 tablet 1    valsartan-hydrochlorothiazide (DIOVAN-HCT) 160-12.5 MG per tablet Take 1 tablet by mouth daily      [DISCONTINUED] dulaglutide (Trulicity) 1.5 MG/0.5ML injection Inject 0.5 mL (1.5 mg total) under the skin every 7 days 6 mL 1     No current facility-administered medications on file prior to visit.      Social History     Tobacco Use    Smoking status: Former     Current packs/day: 0.00     Average packs/day: 1.5 packs/day for 25.7 years (38.5 ttl pk-yrs)     Types: Cigarettes     Start date: 1984     Quit date: 4/10/2010     Years since quittin.5     Passive exposure: Past    Smokeless tobacco: Never   Vaping Use    Vaping status: Never Used   Substance and Sexual Activity    Alcohol use: Never    Drug use: Never    Sexual activity: Yes     Partners: Male     Birth control/protection: None         Objective     /96 (BP Location: Left arm, Patient Position: Sitting)   Pulse 98   Temp 97.8 °F (36.6 °C)   Ht 5' 11\" (1.803 m)   Wt 95.7 kg (211 lb)   SpO2 99%   BMI 29.43 kg/m²     Physical Exam  Vitals reviewed.   Constitutional:       General: She is not in acute distress.     Appearance: Normal appearance.   HENT:      Head: Normocephalic and atraumatic.   Eyes:      General: No scleral icterus.     Conjunctiva/sclera: Conjunctivae normal.   Pulmonary:      Effort: Pulmonary effort is normal. No respiratory distress.   Musculoskeletal:         General: No deformity.      Cervical back: Normal range of motion.   Neurological:      General: No focal deficit present.      Mental Status: She is alert.   Psychiatric:         Mood and Affect: Mood normal.         Behavior: Behavior normal.       Lab Results   Component Value Date    SODIUM " 136 10/07/2024    K 3.9 10/07/2024     10/07/2024    CO2 27 10/07/2024    BUN 15 10/07/2024    CREATININE 0.78 10/07/2024    GLUC 166 (H) 10/02/2024    CALCIUM 9.2 10/07/2024     Lab Results   Component Value Date    HGBA1C 11.5 (H) 09/29/2024     Lab Results   Component Value Date    WBC 8.23 10/07/2024    HGB 14.0 10/07/2024    HCT 41.1 10/07/2024    MCV 94 10/07/2024     (H) 10/07/2024     Lab Results   Component Value Date    CHOLESTEROL 95 09/30/2024    CHOLESTEROL 149 02/01/2023     Lab Results   Component Value Date    HDL 28 (L) 09/30/2024    HDL 64 02/01/2023     Lab Results   Component Value Date    TRIG 157 (H) 09/30/2024    TRIG 122 02/01/2023     Lab Results   Component Value Date    NONHDLC 67 09/30/2024    NONHDLC 85 02/01/2023     Lab Results   Component Value Date    LDLCALC 36 09/30/2024

## 2024-10-08 ENCOUNTER — TELEPHONE (OUTPATIENT)
Dept: ENDOCRINOLOGY | Facility: CLINIC | Age: 59
End: 2024-10-08

## 2024-10-08 LAB — BACTERIA UR CULT: NORMAL

## 2024-10-10 NOTE — TELEPHONE ENCOUNTER
PA for Mounjaro 5mg SUBMITTED     via    [x]M-KEY: [id number]   []Surescripts-Case ID #   []Availity-Auth ID # NDC #   []Faxed to plan   []Other website   []Phone call Case ID #     Office notes sent, clinical questions answered. Awaiting determination    Turnaround time for your insurance to make a decision on your Prior Authorization can take 7-21 business days.

## 2024-10-11 NOTE — TELEPHONE ENCOUNTER
PA for tirzepatide (Mounjaro) 5 MG/0.5ML  APPROVED     Date(s) approved 10/10/24-10/10/25    Case #[id number]    Patient advised by          []Tailt Message  [x]Phone call   []LMOM  []L/M to call office as no active Communication consent on file  []Unable to leave detailed message as VM not approved on Communication consent       Pharmacy advised by    [x]Fax  []Phone call    Approval letter scanned into Media Yes

## 2024-10-15 ENCOUNTER — VBI (OUTPATIENT)
Dept: ADMINISTRATIVE | Facility: OTHER | Age: 59
End: 2024-10-15

## 2024-10-15 NOTE — TELEPHONE ENCOUNTER
10/15/24 7:45 AM     Chart reviewed for Diabetic Eye Exam ; nothing is submitted to the patient's insurance at this time.     YAYO BOWERS MA   [facility]

## 2024-10-17 DIAGNOSIS — I10 PRIMARY HYPERTENSION: ICD-10-CM

## 2024-10-18 DIAGNOSIS — E11.9 TYPE 2 DIABETES MELLITUS WITHOUT COMPLICATION, WITHOUT LONG-TERM CURRENT USE OF INSULIN (HCC): ICD-10-CM

## 2024-10-18 DIAGNOSIS — I10 PRIMARY HYPERTENSION: ICD-10-CM

## 2024-10-18 DIAGNOSIS — Z00.00 HEALTHCARE MAINTENANCE: ICD-10-CM

## 2024-10-18 RX ORDER — METOPROLOL SUCCINATE 25 MG/1
25 TABLET, EXTENDED RELEASE ORAL DAILY
Qty: 90 TABLET | Refills: 0 | Status: SHIPPED | OUTPATIENT
Start: 2024-10-18

## 2024-10-21 RX ORDER — ASPIRIN 81 MG/1
81 TABLET, COATED ORAL DAILY
Qty: 90 TABLET | Refills: 1 | Status: SHIPPED | OUTPATIENT
Start: 2024-10-21

## 2024-10-21 RX ORDER — VALSARTAN AND HYDROCHLOROTHIAZIDE 160; 12.5 MG/1; MG/1
1 TABLET, FILM COATED ORAL DAILY
Qty: 90 TABLET | Refills: 1 | Status: SHIPPED | OUTPATIENT
Start: 2024-10-21

## 2024-10-21 RX ORDER — AMLODIPINE BESYLATE 5 MG/1
5 TABLET ORAL DAILY
Qty: 90 TABLET | Refills: 1 | Status: SHIPPED | OUTPATIENT
Start: 2024-10-21

## 2024-10-27 ENCOUNTER — PATIENT MESSAGE (OUTPATIENT)
Dept: ENDOCRINOLOGY | Facility: CLINIC | Age: 59
End: 2024-10-27

## 2024-10-27 DIAGNOSIS — E11.65 TYPE 2 DIABETES MELLITUS WITH HYPERGLYCEMIA, WITHOUT LONG-TERM CURRENT USE OF INSULIN (HCC): Primary | ICD-10-CM

## 2024-10-29 ENCOUNTER — OFFICE VISIT (OUTPATIENT)
Dept: FAMILY MEDICINE CLINIC | Facility: CLINIC | Age: 59
End: 2024-10-29
Payer: COMMERCIAL

## 2024-10-29 VITALS
SYSTOLIC BLOOD PRESSURE: 146 MMHG | HEIGHT: 71 IN | HEART RATE: 107 BPM | OXYGEN SATURATION: 98 % | BODY MASS INDEX: 30.13 KG/M2 | DIASTOLIC BLOOD PRESSURE: 94 MMHG | WEIGHT: 215.2 LBS

## 2024-10-29 DIAGNOSIS — I10 PRIMARY HYPERTENSION: Primary | ICD-10-CM

## 2024-10-29 DIAGNOSIS — K21.9 GASTROESOPHAGEAL REFLUX DISEASE WITHOUT ESOPHAGITIS: ICD-10-CM

## 2024-10-29 DIAGNOSIS — Z00.00 HEALTHCARE MAINTENANCE: ICD-10-CM

## 2024-10-29 DIAGNOSIS — Z12.31 ENCOUNTER FOR SCREENING MAMMOGRAM FOR BREAST CANCER: ICD-10-CM

## 2024-10-29 DIAGNOSIS — R09.82 PND (POST-NASAL DRIP): ICD-10-CM

## 2024-10-29 PROCEDURE — 99213 OFFICE O/P EST LOW 20 MIN: CPT | Performed by: PHYSICIAN ASSISTANT

## 2024-10-29 RX ORDER — VALSARTAN AND HYDROCHLOROTHIAZIDE 160; 12.5 MG/1; MG/1
1 TABLET, FILM COATED ORAL DAILY
Qty: 90 TABLET | Refills: 1 | Status: SHIPPED | OUTPATIENT
Start: 2024-10-29

## 2024-10-29 RX ORDER — ASPIRIN 81 MG/1
81 TABLET ORAL DAILY
Qty: 90 TABLET | Refills: 1 | Status: SHIPPED | OUTPATIENT
Start: 2024-10-29

## 2024-10-29 RX ORDER — AMLODIPINE BESYLATE 5 MG/1
5 TABLET ORAL DAILY
Qty: 90 TABLET | Refills: 1 | Status: SHIPPED | OUTPATIENT
Start: 2024-10-29

## 2024-10-29 RX ORDER — METOPROLOL SUCCINATE 25 MG/1
25 TABLET, EXTENDED RELEASE ORAL DAILY
Qty: 90 TABLET | Refills: 1 | Status: SHIPPED | OUTPATIENT
Start: 2024-10-29

## 2024-10-29 NOTE — ASSESSMENT & PLAN NOTE
Controlled, continue same. Continue to monitor at home.  Orders:    amLODIPine (NORVASC) 5 mg tablet; Take 1 tablet (5 mg total) by mouth daily    metoprolol succinate (TOPROL-XL) 25 mg 24 hr tablet; Take 1 tablet (25 mg total) by mouth daily    valsartan-hydrochlorothiazide (DIOVAN-HCT) 160-12.5 MG per tablet; Take 1 tablet by mouth daily

## 2024-10-29 NOTE — PROGRESS NOTES
Ambulatory Visit  Name: Korin Wilhelm      : 1965      MRN: [id number]  Encounter Provider: Abbie Campos PA-C  Encounter Date: 10/29/2024   Encounter department: [facility]    Assessment & Plan  Primary hypertension  Controlled, continue same. Continue to monitor at home.  Orders:    amLODIPine (NORVASC) 5 mg tablet; Take 1 tablet (5 mg total) by mouth daily    metoprolol succinate (TOPROL-XL) 25 mg 24 hr tablet; Take 1 tablet (25 mg total) by mouth daily    valsartan-hydrochlorothiazide (DIOVAN-HCT) 160-12.5 MG per tablet; Take 1 tablet by mouth daily    Encounter for screening mammogram for breast cancer    Orders:    Mammo screening bilateral w 3d and cad; Future    Healthcare maintenance    Orders:    aspirin (Aspirin Low Dose) 81 mg EC tablet; Take 1 tablet (81 mg total) by mouth daily    Gastroesophageal reflux disease without esophagitis  Stable.       PND (post-nasal drip)  Stable.         Depression Screening and Follow-up Plan: Patient was screened for depression during today's encounter. They screened negative with a PHQ-2 score of 0.      History of Present Illness     Korin is here today for routine follow up. Recently was hospitalized due to UTI/sepsis, feeling much better. Endo also adjusted diabetic meds as her A1c is >11. Pt admits that she will improve diet, has already started, and is now on Jardiance.          Review of Systems   Constitutional:  Negative for chills and fever.   HENT:  Negative for ear pain and sore throat.    Eyes:  Negative for pain and visual disturbance.   Respiratory:  Negative for cough and shortness of breath.    Cardiovascular:  Negative for chest pain and palpitations.   Gastrointestinal:  Negative for abdominal pain and vomiting.   Genitourinary:  Negative for dysuria and hematuria.   Musculoskeletal:  Negative for arthralgias and back pain.   Skin:  Negative for color change and rash.   Neurological:  Negative for seizures and syncope.  "  All other systems reviewed and are negative.          Objective     /94   Pulse (!) 107   Ht 5' 11\" (1.803 m)   Wt 97.6 kg (215 lb 3.2 oz)   SpO2 98%   BMI 30.01 kg/m²     Physical Exam  Vitals reviewed.   Constitutional:       General: She is not in acute distress.     Appearance: She is well-developed. She is not diaphoretic.   HENT:      Head: Normocephalic and atraumatic.      Right Ear: Hearing, tympanic membrane, ear canal and external ear normal.      Left Ear: Hearing, tympanic membrane, ear canal and external ear normal.      Nose: Nose normal.      Mouth/Throat:      Mouth: Mucous membranes are moist.      Pharynx: Oropharynx is clear. Uvula midline. No oropharyngeal exudate.   Eyes:      General: No scleral icterus.        Right eye: No discharge.         Left eye: No discharge.      Conjunctiva/sclera: Conjunctivae normal.   Neck:      Thyroid: No thyromegaly.      Vascular: No carotid bruit.   Cardiovascular:      Rate and Rhythm: Normal rate and regular rhythm.      Heart sounds: Normal heart sounds. No murmur heard.  Pulmonary:      Effort: Pulmonary effort is normal. No respiratory distress.      Breath sounds: Normal breath sounds. No wheezing.   Abdominal:      General: Bowel sounds are normal. There is no distension.      Palpations: Abdomen is soft. There is no mass.      Tenderness: There is no abdominal tenderness. There is no guarding or rebound.   Musculoskeletal:         General: No tenderness. Normal range of motion.      Cervical back: Neck supple.   Lymphadenopathy:      Cervical: No cervical adenopathy.   Skin:     General: Skin is warm and dry.      Findings: No erythema or rash.   Neurological:      Mental Status: She is alert and oriented to person, place, and time.   Psychiatric:         Behavior: Behavior normal.         Thought Content: Thought content normal.         Judgment: Judgment normal.         "

## 2024-11-01 PROBLEM — A41.9 SEPSIS (HCC): Status: RESOLVED | Noted: 2024-09-29 | Resolved: 2024-11-01

## 2024-12-17 ENCOUNTER — VBI (OUTPATIENT)
Dept: ADMINISTRATIVE | Facility: OTHER | Age: 59
End: 2024-12-17

## 2024-12-17 NOTE — TELEPHONE ENCOUNTER
12/17/24 7:41 AM     Chart reviewed for Hemoglobin A1c ; nothing is submitted to the patient's insurance at this time.     Anabela Ragland MA   [facility]

## 2024-12-24 ENCOUNTER — TELEPHONE (OUTPATIENT)
Dept: OTOLARYNGOLOGY | Facility: CLINIC | Age: 59
End: 2024-12-24

## 2025-01-20 DIAGNOSIS — E11.65 TYPE 2 DIABETES MELLITUS WITH HYPERGLYCEMIA, WITHOUT LONG-TERM CURRENT USE OF INSULIN (HCC): ICD-10-CM

## 2025-01-21 RX ORDER — TIRZEPATIDE 5 MG/.5ML
INJECTION, SOLUTION SUBCUTANEOUS
Qty: 2 ML | Refills: 3 | Status: SHIPPED | OUTPATIENT
Start: 2025-01-21

## 2025-01-27 DIAGNOSIS — E11.9 TYPE 2 DIABETES MELLITUS WITHOUT COMPLICATION, WITHOUT LONG-TERM CURRENT USE OF INSULIN (HCC): ICD-10-CM

## 2025-01-28 RX ORDER — BLOOD SUGAR DIAGNOSTIC
STRIP MISCELLANEOUS
Qty: 100 STRIP | Refills: 1 | Status: SHIPPED | OUTPATIENT
Start: 2025-01-28

## 2025-01-31 ENCOUNTER — TELEPHONE (OUTPATIENT)
Age: 60
End: 2025-01-31

## 2025-01-31 NOTE — TELEPHONE ENCOUNTER
PA for  glucose blood (Accu-Chek Guide Test)  APPROVED     Date(s) approved  January 31, 2025 to January 31, 2026       Patient advised by          [x]MyChart Message  []Phone call   []LMOM  []L/M to call office as no active Communication consent on file  [x]Unable to call per communication form        Pharmacy advised by    [x]Fax  []Phone call    Approval letter scanned into Media No was not available upon approval

## 2025-01-31 NOTE — TELEPHONE ENCOUNTER
PA for glucose blood (Accu-Chek Guide Test) test strip SUBMITTED to     via    []CMM-KEY:   [x]Surescripts-Case ID #   []Availity-Auth ID # NDC #   []Faxed to plan   []Other website   []Phone call Case ID #     [x]PA sent as URGENT    All office notes, labs and other pertaining documents and studies sent. Clinical questions answered. Awaiting determination from insurance company.     Turnaround time for your insurance to make a decision on your Prior Authorization can take 7-21 business days.

## 2025-02-12 DIAGNOSIS — E11.65 TYPE 2 DIABETES MELLITUS WITH HYPERGLYCEMIA, WITHOUT LONG-TERM CURRENT USE OF INSULIN (HCC): ICD-10-CM

## 2025-02-13 RX ORDER — EMPAGLIFLOZIN 25 MG/1
25 TABLET, FILM COATED ORAL EVERY MORNING
Qty: 30 TABLET | Refills: 5 | Status: SHIPPED | OUTPATIENT
Start: 2025-02-13

## 2025-03-17 ENCOUNTER — RESULTS FOLLOW-UP (OUTPATIENT)
Dept: FAMILY MEDICINE CLINIC | Facility: CLINIC | Age: 60
End: 2025-03-17

## 2025-03-17 ENCOUNTER — APPOINTMENT (OUTPATIENT)
Dept: LAB | Facility: HOSPITAL | Age: 60
End: 2025-03-17
Payer: COMMERCIAL

## 2025-03-17 DIAGNOSIS — E11.9 TYPE 2 DIABETES MELLITUS WITHOUT COMPLICATION, WITHOUT LONG-TERM CURRENT USE OF INSULIN (HCC): ICD-10-CM

## 2025-03-17 DIAGNOSIS — D75.839 THROMBOCYTOSIS: ICD-10-CM

## 2025-03-17 DIAGNOSIS — E11.65 TYPE 2 DIABETES MELLITUS WITH HYPERGLYCEMIA, WITHOUT LONG-TERM CURRENT USE OF INSULIN (HCC): ICD-10-CM

## 2025-03-17 LAB
ANION GAP SERPL CALCULATED.3IONS-SCNC: 9 MMOL/L (ref 4–13)
BASOPHILS # BLD AUTO: 0.05 THOUSANDS/ÂΜL (ref 0–0.1)
BASOPHILS NFR BLD AUTO: 1 % (ref 0–1)
BUN SERPL-MCNC: 27 MG/DL (ref 5–25)
CALCIUM SERPL-MCNC: 10 MG/DL (ref 8.4–10.2)
CHLORIDE SERPL-SCNC: 102 MMOL/L (ref 96–108)
CHOLEST SERPL-MCNC: 145 MG/DL (ref ?–200)
CO2 SERPL-SCNC: 26 MMOL/L (ref 21–32)
CREAT SERPL-MCNC: 0.88 MG/DL (ref 0.6–1.3)
CREAT UR-MCNC: 39.8 MG/DL
EOSINOPHIL # BLD AUTO: 0.09 THOUSAND/ÂΜL (ref 0–0.61)
EOSINOPHIL NFR BLD AUTO: 1 % (ref 0–6)
ERYTHROCYTE [DISTWIDTH] IN BLOOD BY AUTOMATED COUNT: 12.5 % (ref 11.6–15.1)
EST. AVERAGE GLUCOSE BLD GHB EST-MCNC: 166 MG/DL
GFR SERPL CREATININE-BSD FRML MDRD: 71 ML/MIN/1.73SQ M
GLUCOSE P FAST SERPL-MCNC: 186 MG/DL (ref 65–99)
HBA1C MFR BLD: 7.4 %
HCT VFR BLD AUTO: 46 % (ref 34.8–46.1)
HDLC SERPL-MCNC: 52 MG/DL
HGB BLD-MCNC: 15.5 G/DL (ref 11.5–15.4)
IMM GRANULOCYTES # BLD AUTO: 0.02 THOUSAND/UL (ref 0–0.2)
IMM GRANULOCYTES NFR BLD AUTO: 0 % (ref 0–2)
LDLC SERPL CALC-MCNC: 71 MG/DL (ref 0–100)
LYMPHOCYTES # BLD AUTO: 2.08 THOUSANDS/ÂΜL (ref 0.6–4.47)
LYMPHOCYTES NFR BLD AUTO: 26 % (ref 14–44)
MCH RBC QN AUTO: 31.8 PG (ref 26.8–34.3)
MCHC RBC AUTO-ENTMCNC: 33.7 G/DL (ref 31.4–37.4)
MCV RBC AUTO: 95 FL (ref 82–98)
MICROALBUMIN UR-MCNC: <7 MG/L
MONOCYTES # BLD AUTO: 0.95 THOUSAND/ÂΜL (ref 0.17–1.22)
MONOCYTES NFR BLD AUTO: 12 % (ref 4–12)
NEUTROPHILS # BLD AUTO: 4.7 THOUSANDS/ÂΜL (ref 1.85–7.62)
NEUTS SEG NFR BLD AUTO: 60 % (ref 43–75)
NRBC BLD AUTO-RTO: 0 /100 WBCS
PLATELET # BLD AUTO: 311 THOUSANDS/UL (ref 149–390)
PMV BLD AUTO: 10.1 FL (ref 8.9–12.7)
POTASSIUM SERPL-SCNC: 3.7 MMOL/L (ref 3.5–5.3)
RBC # BLD AUTO: 4.87 MILLION/UL (ref 3.81–5.12)
SODIUM SERPL-SCNC: 137 MMOL/L (ref 135–147)
TRIGL SERPL-MCNC: 108 MG/DL (ref ?–150)
WBC # BLD AUTO: 7.89 THOUSAND/UL (ref 4.31–10.16)

## 2025-03-17 PROCEDURE — 80048 BASIC METABOLIC PNL TOTAL CA: CPT

## 2025-03-17 PROCEDURE — 36415 COLL VENOUS BLD VENIPUNCTURE: CPT

## 2025-03-17 PROCEDURE — 85025 COMPLETE CBC W/AUTO DIFF WBC: CPT

## 2025-03-17 PROCEDURE — 83036 HEMOGLOBIN GLYCOSYLATED A1C: CPT

## 2025-03-17 PROCEDURE — 80061 LIPID PANEL: CPT

## 2025-03-18 ENCOUNTER — RESULTS FOLLOW-UP (OUTPATIENT)
Dept: ENDOCRINOLOGY | Facility: CLINIC | Age: 60
End: 2025-03-18

## 2025-03-20 ENCOUNTER — TELEPHONE (OUTPATIENT)
Age: 60
End: 2025-03-20

## 2025-03-20 NOTE — TELEPHONE ENCOUNTER
Pt wanted to schedule EGD/Colonoscopy - when told needed OV first - she changed her mind OV not avail until May - advised further Colon recall date isn't until Feb 2030... nothing was scheduled .

## 2025-04-04 ENCOUNTER — OFFICE VISIT (OUTPATIENT)
Dept: ENDOCRINOLOGY | Facility: CLINIC | Age: 60
End: 2025-04-04
Payer: COMMERCIAL

## 2025-04-04 VITALS
DIASTOLIC BLOOD PRESSURE: 86 MMHG | TEMPERATURE: 98 F | SYSTOLIC BLOOD PRESSURE: 140 MMHG | HEIGHT: 71 IN | WEIGHT: 207 LBS | HEART RATE: 76 BPM | BODY MASS INDEX: 28.98 KG/M2 | OXYGEN SATURATION: 99 %

## 2025-04-04 DIAGNOSIS — E11.319 TYPE 2 DIABETES MELLITUS WITH RETINOPATHY WITHOUT MACULAR EDEMA, WITHOUT LONG-TERM CURRENT USE OF INSULIN, UNSPECIFIED LATERALITY, UNSPECIFIED RETINOPATHY SEVERITY (HCC): ICD-10-CM

## 2025-04-04 DIAGNOSIS — E11.65 TYPE 2 DIABETES MELLITUS WITH HYPERGLYCEMIA, WITHOUT LONG-TERM CURRENT USE OF INSULIN (HCC): Primary | ICD-10-CM

## 2025-04-04 DIAGNOSIS — K76.0 HEPATIC STEATOSIS: ICD-10-CM

## 2025-04-04 DIAGNOSIS — I10 PRIMARY HYPERTENSION: ICD-10-CM

## 2025-04-04 DIAGNOSIS — E78.2 MIXED HYPERLIPIDEMIA: ICD-10-CM

## 2025-04-04 PROCEDURE — 99214 OFFICE O/P EST MOD 30 MIN: CPT | Performed by: STUDENT IN AN ORGANIZED HEALTH CARE EDUCATION/TRAINING PROGRAM

## 2025-04-04 RX ORDER — DAPAGLIFLOZIN 10 MG/1
10 TABLET, FILM COATED ORAL DAILY
Qty: 30 TABLET | Refills: 3 | Status: SHIPPED | OUTPATIENT
Start: 2025-04-04

## 2025-04-04 RX ORDER — ATORVASTATIN CALCIUM 20 MG/1
1 TABLET, FILM COATED ORAL DAILY
COMMUNITY
Start: 2025-01-17

## 2025-04-04 NOTE — PROGRESS NOTES
Name: Korin Wilhelm      : 1965      MRN: [id number]  Encounter Provider: Kevin Villafana DO  Encounter Date: 2025   Encounter department: [facility]    Chief Complaint   Patient presents with   • Diabetes Type 2   :  Assessment & Plan  Type 2 diabetes mellitus with hyperglycemia, without long-term current use of insulin (HCC)  Improving. Her fasting hyperglycemia was discussed, and it was explained that this could be related to her liver function. The potential benefits of SGLT2i for insulin sensitivity were also discussed. She noted an intolerance to jardiance. A prescription for Farxiga will be provided as an alternative to Jardiance, with the expectation that it will aid in managing her hyperglycemia and improve metabolic health.    She reported experiencing heartburn, which led to discontinuation of Jardiance. The potential long-term effects of medications like Pantoprazole, such as malabsorption of calcium and iron, were discussed. She was advised to use Pepcid as needed for heartburn relief.    Follow-up  The patient will follow up in 4 months.    Lab Results   Component Value Date    HGBA1C 7.4 (H) 2025       Orders:  •  dapagliflozin (Farxiga) 10 MG tablet; Take 1 tablet (10 mg total) by mouth daily  •  Basic metabolic panel; Future  •  Hemoglobin A1C; Future  •  Ambulatory Referral to Ophthalmology; Future    Mixed hyperlipidemia  Improving control. 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    She has been  self-monitoring her blood glucose levels at home, with fasting glucose readings consistently around 140 mg/dL and daytime readings ranging from 92 to 110 mg/dL. She reports no symptoms indicative of hyperglycemia or hypoglycemia. The patient experiences occasional nocturia and maintains polydipsia. She expresses a preference to avoid insulin therapy and has been implementing dietary modifications, including reducing bread intake and increasing egg consumption. She has incorporated potatoes into her diet, which she reports do not significantly affect her glycemic control, whereas cauliflower appears to elevate her blood glucose levels. She recently underwent an ophthalmologic examination and reports no current podiatric issues. She applies emollient (Vaseline) to her feet daily post-shower. The patient engages in regular power walking exercises. She discontinued Jardiance due to gastroesophageal reflux disease (GERD) symptoms and is seeking an alternative medication. She recalls a previous allergic reaction to Ozempic but does not attribute it to Jardiance, as she had tolerated Jardiance prior to initiating Ozempic without adverse effects. She reports that Jardiance was effective in lowering her blood glucose levels. Currently, she is on Mounjaro, which occasionally causes gastrointestinal symptoms such as nausea, flatulence, and eructation, along with malaise for two days following her weekly injection.    The patient has expressed interest in initiating pantoprazole for her GERD symptoms.    Supplemental Information  She plans to undergo urinalysis prior to her upcoming cruise trip. She recalls a previous episode of urinary tract infection (UTI) preceding a cruise trip, which she believes was exacerbated by the use of the Jacuzzi and pool during the trip.    ALLERGIES  The patient had an allergic reaction to OZEMPIC.    MEDICATIONS  Discontinued: Jardiance  Past: [facility] Maintenance   Topic Date Due   •  "Diabetic Eye Exam  10/05/2023   • Diabetic Foot Exam  04/04/2026     Pertinent Medical History         Review of Systems as per [facility]         Medical History Reviewed by provider this encounter:  Tobacco  Allergies  Meds  Problems  Med Hx  Surg Hx  Fam Hx     .    Objective   /86 (BP Location: Right arm, Patient Position: Sitting)   Pulse 76   Temp 98 °F (36.7 °C)   Ht 5' 11\" (1.803 m)   Wt 93.9 kg (207 lb)   SpO2 99%   BMI 28.87 kg/m²      Body mass index is 28.87 kg/m².  Wt Readings from Last 3 Encounters:   04/04/25 93.9 kg (207 lb)   10/29/24 97.6 kg (215 lb 3.2 oz)   10/07/24 95.7 kg (211 lb)     Physical Exam    Physical Exam  Vitals reviewed.   Constitutional:       General: She is not in acute distress.     Appearance: Normal appearance.   HENT:      Head: Normocephalic and atraumatic.   Eyes:      General: No scleral icterus.     Conjunctiva/sclera: Conjunctivae normal.   Cardiovascular:      Pulses: no weak pulses.           Dorsalis pedis pulses are 2+ on the right side and 2+ on the left side.   Pulmonary:      Effort: Pulmonary effort is normal. No respiratory distress.   Musculoskeletal:         General: No deformity.      Cervical back: Normal range of motion.   Feet:      Right foot:      Skin integrity: No ulcer, skin breakdown, erythema, warmth, callus or dry skin.      Left foot:      Skin integrity: No ulcer, skin breakdown, erythema, warmth, callus or dry skin.   Neurological:      General: No focal deficit present.      Mental Status: She is alert.   Psychiatric:         Mood and Affect: Mood normal.         Behavior: Behavior normal.     Patient's shoes and socks removed.    Right Foot/Ankle   Right Foot Inspection  Skin Exam: skin normal and skin intact. No dry skin, no warmth, no callus, no erythema, no maceration, no abnormal color, no pre-ulcer, no ulcer and no callus.     Toe Exam: ROM and strength within normal limits.     Sensory   Vibration: intact  Monofilament " testing: intact    Vascular  The right DP pulse is 2+.     Left Foot/Ankle  Left Foot Inspection  Skin Exam: skin normal and skin intact. No dry skin, no warmth, no erythema, no maceration, normal color, no pre-ulcer, no ulcer and no callus.     Toe Exam: ROM and strength within normal limits.     Sensory   Vibration: intact  Monofilament testing: intact    Vascular  The left DP pulse is 2+.     Assign Risk Category  No deformity present  No loss of protective sensation  No weak pulses  Risk: 0    Results  Laboratory Studies  Morning blood sugar at home was 140, and during the day, it ranged from 92 to 110.  Labs:   Lab Results   Component Value Date    HGBA1C 7.4 (H) 03/17/2025    HGBA1C 11.5 (H) 09/29/2024    HGBA1C 8.5 (H) 02/14/2024     Lab Results   Component Value Date    CREATININE 0.88 03/17/2025    CREATININE 0.78 10/07/2024    CREATININE 0.55 (L) 10/02/2024    BUN 27 (H) 03/17/2025    K 3.7 03/17/2025     03/17/2025    CO2 26 03/17/2025     eGFR   Date Value Ref Range Status   03/17/2025 71 ml/min/1.73sq m Final     Lab Results   Component Value Date    HDL 52 03/17/2025    TRIG 108 03/17/2025     Lab Results   Component Value Date    ALT 45 10/07/2024    AST 22 10/07/2024    ALKPHOS 59 10/07/2024     Lab Results   Component Value Date    ZCF3ZEGSQQYL 0.747 09/29/2024    [id number] 1.129 10/22/2022       There are no Patient Instructions on file for this visit.    Discussed with the patient and all questioned fully answered. She will call me if any problems arise.

## 2025-04-08 ENCOUNTER — TELEPHONE (OUTPATIENT)
Age: 60
End: 2025-04-08

## 2025-04-08 NOTE — TELEPHONE ENCOUNTER
PA for (Farxiga) 10 MG tablet SUBMITTED to [facility]     via      [x][employer]-Case ID #     [x]PA sent as URGENT    All office notes, labs and other pertaining documents and studies sent. Clinical questions answered. Awaiting determination from insurance company.     Turnaround time for your insurance to make a decision on your Prior Authorization can take 7-21 business days.

## 2025-04-08 NOTE — TELEPHONE ENCOUNTER
PA for (Farxiga) 10 MG tablet  APPROVED     Date(s) approved April 8, 2025 to April 8, 2026         Patient advised by          []MyChart Message  [x]Phone call   []LMOM  []L/M to call office as no active Communication consent on file  []Unable to leave detailed message as VM not approved on Communication consent       Pharmacy advised by    [x]Fax  []Phone call  []Secure Chat    Specialty Pharmacy    []     Approval letter scanned into Media Yes

## 2025-04-14 DIAGNOSIS — E11.9 TYPE 2 DIABETES MELLITUS WITHOUT COMPLICATION, WITHOUT LONG-TERM CURRENT USE OF INSULIN (HCC): ICD-10-CM

## 2025-04-16 RX ORDER — ATORVASTATIN CALCIUM 20 MG/1
20 TABLET, FILM COATED ORAL DAILY
Qty: 90 TABLET | Refills: 1 | Status: SHIPPED | OUTPATIENT
Start: 2025-04-16

## 2025-04-21 DIAGNOSIS — E11.65 TYPE 2 DIABETES MELLITUS WITH HYPERGLYCEMIA, WITHOUT LONG-TERM CURRENT USE OF INSULIN (HCC): ICD-10-CM

## 2025-04-21 RX ORDER — DAPAGLIFLOZIN 10 MG/1
10 TABLET, FILM COATED ORAL DAILY
Qty: 90 TABLET | Refills: 0 | Status: SHIPPED | OUTPATIENT
Start: 2025-04-21

## 2025-04-21 NOTE — TELEPHONE ENCOUNTER
Patient requesting 90 day supply, knows it will be expensive     Reason for call:   [x] Refill   [] Prior Auth  [] Other:     Office:   [] PCP/Provider -   [x] Specialty/Provider -Kevin Bear    Medication: Farxiga    Dose/Frequency: 10 mg Daily     Quantity: 90    Pharmacy: [facility] [place]  [facility]   Does the patient have enough for 3 days?   [x] Yes   [] No - Send as HP to POD    Mail Away Pharmacy   Does the patient have enough for 10 days?   [] Yes   [] No - Send as HP to POD

## 2025-04-24 ENCOUNTER — RESULTS FOLLOW-UP (OUTPATIENT)
Dept: FAMILY MEDICINE CLINIC | Facility: CLINIC | Age: 60
End: 2025-04-24

## 2025-04-24 DIAGNOSIS — Z12.31 ENCOUNTER FOR SCREENING MAMMOGRAM FOR BREAST CANCER: ICD-10-CM

## 2025-04-29 ENCOUNTER — OFFICE VISIT (OUTPATIENT)
Dept: FAMILY MEDICINE CLINIC | Facility: CLINIC | Age: 60
End: 2025-04-29
Payer: COMMERCIAL

## 2025-04-29 VITALS
TEMPERATURE: 98.4 F | HEIGHT: 71 IN | OXYGEN SATURATION: 99 % | WEIGHT: 213.4 LBS | DIASTOLIC BLOOD PRESSURE: 76 MMHG | SYSTOLIC BLOOD PRESSURE: 138 MMHG | HEART RATE: 94 BPM | BODY MASS INDEX: 29.88 KG/M2

## 2025-04-29 DIAGNOSIS — Z00.00 ANNUAL PHYSICAL EXAM: Primary | ICD-10-CM

## 2025-04-29 DIAGNOSIS — Z00.00 HEALTHCARE MAINTENANCE: ICD-10-CM

## 2025-04-29 DIAGNOSIS — K21.9 GASTROESOPHAGEAL REFLUX DISEASE WITHOUT ESOPHAGITIS: ICD-10-CM

## 2025-04-29 DIAGNOSIS — E11.9 TYPE 2 DIABETES MELLITUS WITHOUT COMPLICATION, WITHOUT LONG-TERM CURRENT USE OF INSULIN (HCC): ICD-10-CM

## 2025-04-29 DIAGNOSIS — I10 PRIMARY HYPERTENSION: ICD-10-CM

## 2025-04-29 DIAGNOSIS — R09.82 PND (POST-NASAL DRIP): ICD-10-CM

## 2025-04-29 PROCEDURE — 99396 PREV VISIT EST AGE 40-64: CPT | Performed by: PHYSICIAN ASSISTANT

## 2025-04-29 PROCEDURE — 99214 OFFICE O/P EST MOD 30 MIN: CPT | Performed by: PHYSICIAN ASSISTANT

## 2025-04-29 RX ORDER — ATORVASTATIN CALCIUM 20 MG/1
20 TABLET, FILM COATED ORAL DAILY
Qty: 90 TABLET | Refills: 1 | Status: SHIPPED | OUTPATIENT
Start: 2025-04-29

## 2025-04-29 RX ORDER — METOPROLOL SUCCINATE 25 MG/1
25 TABLET, EXTENDED RELEASE ORAL DAILY
Qty: 90 TABLET | Refills: 1 | Status: SHIPPED | OUTPATIENT
Start: 2025-04-29

## 2025-04-29 RX ORDER — ASPIRIN 81 MG/1
81 TABLET ORAL DAILY
Qty: 90 TABLET | Refills: 1 | Status: SHIPPED | OUTPATIENT
Start: 2025-04-29

## 2025-04-29 RX ORDER — FAMOTIDINE 40 MG/1
20 TABLET, FILM COATED ORAL 2 TIMES DAILY
Qty: 90 TABLET | Refills: 1 | Status: SHIPPED | OUTPATIENT
Start: 2025-04-29

## 2025-04-29 RX ORDER — AMLODIPINE BESYLATE 5 MG/1
5 TABLET ORAL DAILY
Qty: 90 TABLET | Refills: 1 | Status: SHIPPED | OUTPATIENT
Start: 2025-04-29

## 2025-04-29 RX ORDER — VALSARTAN AND HYDROCHLOROTHIAZIDE 320; 25 MG/1; MG/1
1 TABLET, FILM COATED ORAL DAILY
Qty: 90 TABLET | Refills: 1 | Status: SHIPPED | OUTPATIENT
Start: 2025-04-29

## 2025-04-29 NOTE — ASSESSMENT & PLAN NOTE
Orders:  •  amLODIPine (NORVASC) 5 mg tablet; Take 1 tablet (5 mg total) by mouth daily  •  metoprolol succinate (TOPROL-XL) 25 mg 24 hr tablet; Take 1 tablet (25 mg total) by mouth daily  •  valsartan-hydrochlorothiazide (DIOVAN-HCT) 320-25 MG per tablet; Take 1 tablet by mouth daily

## 2025-04-29 NOTE — PROGRESS NOTES
Adult Annual Physical  Name: Korin Wilhelm      : 1965      MRN: [id number]  Encounter Provider: Abbie Campos PA-C  Encounter Date: 2025   Encounter department: [facility]    :  Assessment & Plan  Annual physical exam         Primary hypertension    Orders:  •  amLODIPine (NORVASC) 5 mg tablet; Take 1 tablet (5 mg total) by mouth daily  •  metoprolol succinate (TOPROL-XL) 25 mg 24 hr tablet; Take 1 tablet (25 mg total) by mouth daily  •  valsartan-hydrochlorothiazide (DIOVAN-HCT) 320-25 MG per tablet; Take 1 tablet by mouth daily    Healthcare maintenance    Orders:  •  aspirin (Aspirin Low Dose) 81 mg EC tablet; Take 1 tablet (81 mg total) by mouth daily    Type 2 diabetes mellitus without complication, without long-term current use of insulin (Formerly [facility])    Lab Results   Component Value Date    HGBA1C 7.4 (H) 2025       Orders:  •  atorvastatin (LIPITOR) 20 mg tablet; Take 1 tablet (20 mg total) by mouth daily  •  metFORMIN (GLUCOPHAGE) 1000 MG tablet; Take 1 tablet (1,000 mg total) by mouth 2 (two) times a day with meals    Gastroesophageal reflux disease without esophagitis    Orders:  •  famotidine (PEPCID) 40 MG tablet; Take 0.5 tablets (20 mg total) by mouth 2 (two) times a day    PND (post-nasal drip)    Orders:  •  famotidine (PEPCID) 40 MG tablet; Take 0.5 tablets (20 mg total) by mouth 2 (two) times a day    Annual physical exam           Annual physical exam               Preventive Screenings:  - Diabetes Screening: screening not indicated and has diabetes  - Cholesterol Screening: screening not indicated and has hyperlipidemia   - Hepatitis C screening: screening up-to-date   - HIV screening: screening up-to-date   - Breast cancer screening: screening up-to-date   - Colon cancer screening: screening up-to-date   - Lung cancer screening: screening not indicated     Immunizations:  - Immunizations due: Influenza, Prevnar 20, Tdap and Zoster (Shingrix)         History of  "Present Illness     Adult Annual Physical:  Patient presents for annual physical. Korin is here today for routine 6 month appointment. She is doing great. DM is well controlled once again. She needs refills.  She is due for a diabetic eye exam, information/form given for Gurinder Tristen, patient will call and make an appointment, declines [facility] exam today.  .     Diet and Physical Activity:  - Diet/Nutrition: diabetic diet.    General Health:    - Vision: wears glasses.    Review of Systems   Constitutional:  Negative for chills and fever.   HENT:  Negative for ear pain and sore throat.    Eyes:  Negative for pain and visual disturbance.   Respiratory:  Negative for cough and shortness of breath.    Cardiovascular:  Negative for chest pain and palpitations.   Gastrointestinal:  Negative for abdominal pain and vomiting.   Genitourinary:  Negative for dysuria and hematuria.   Musculoskeletal:  Negative for arthralgias and back pain.   Skin:  Negative for color change and rash.   Neurological:  Negative for seizures and syncope.   All other systems reviewed and are negative.        Objective   /76   Pulse 94   Temp 98.4 °F (36.9 °C)   Ht 5' 11\" (1.803 m)   Wt 96.8 kg (213 lb 6.4 oz)   SpO2 99%   BMI 29.76 kg/m²     Physical Exam  Vitals reviewed.   Constitutional:       General: She is not in acute distress.     Appearance: She is well-developed. She is not diaphoretic.   HENT:      Head: Normocephalic and atraumatic.      Right Ear: Hearing, tympanic membrane, ear canal and external ear normal.      Left Ear: Hearing, tympanic membrane, ear canal and external ear normal.      Nose: Nose normal.      Mouth/Throat:      Mouth: Mucous membranes are moist.      Pharynx: Oropharynx is clear. Uvula midline. No oropharyngeal exudate.   Eyes:      General: No scleral icterus.        Right eye: No discharge.         Left eye: No discharge.      Conjunctiva/sclera: Conjunctivae normal.   Neck:      Thyroid: No " thyromegaly.      Vascular: No carotid bruit.   Cardiovascular:      Rate and Rhythm: Normal rate and regular rhythm.      Heart sounds: Normal heart sounds. No murmur heard.  Pulmonary:      Effort: Pulmonary effort is normal. No respiratory distress.      Breath sounds: Normal breath sounds. No wheezing.   Abdominal:      General: Bowel sounds are normal. There is no distension.      Palpations: Abdomen is soft. There is no mass.      Tenderness: There is no abdominal tenderness. There is no guarding or rebound.   Musculoskeletal:         General: No tenderness. Normal range of motion.      Cervical back: Neck supple.   Lymphadenopathy:      Cervical: No cervical adenopathy.   Skin:     General: Skin is warm and dry.      Findings: No erythema or rash.   Neurological:      Mental Status: She is alert and oriented to person, place, and time.   Psychiatric:         Behavior: Behavior normal.         Thought Content: Thought content normal.         Judgment: Judgment normal.

## 2025-04-29 NOTE — PATIENT INSTRUCTIONS
"Patient Education     Routine physical for adults   The Basics   Written by the doctors and editors at [facility]   What is a physical? -- A physical is a routine visit, or \"check-up,\" with your doctor. You might also hear it called a \"wellness visit\" or \"preventive visit.\"  During each visit, the doctor will:   Ask about your physical and mental health   Ask about your habits, behaviors, and lifestyle   Do an exam   Give you vaccines if needed   Talk to you about any medicines you take   Give advice about your health   Answer your questions  Getting regular check-ups is an important part of taking care of your health. It can help your doctor find and treat any problems you have. But it's also important for preventing health problems.  A routine physical is different from a \"sick visit.\" A sick visit is when you see a doctor because of a health concern or problem. Since physicals are scheduled ahead of time, you can think about what you want to ask the doctor.  How often should I get a physical? -- It depends on your age and health. In general, for people age 21 years and older:   If you are younger than 50 years, you might be able to get a physical every 3 years.   If you are 50 years or older, your doctor might recommend a physical every year.  If you have an ongoing health condition, like diabetes or high blood pressure, your doctor will probably want to see you more often.  What happens during a physical? -- In general, each visit will include:   Physical exam - The doctor or nurse will check your height, weight, heart rate, and blood pressure. They will also look at your eyes and ears. They will ask about how you are feeling and whether you have any symptoms that bother you.   Medicines - It's a good idea to bring a list of all the medicines you take to each doctor visit. Your doctor will talk to you about your medicines and answer any questions. Tell them if you are having any side effects that bother you. You " "should also tell them if you are having trouble paying for any of your medicines.   Habits and behaviors - This includes:   Your diet   Your exercise habits   Whether you smoke, drink alcohol, or use drugs   Whether you are sexually active   Whether you feel safe at home  Your doctor will talk to you about things you can do to improve your health and lower your risk of health problems. They will also offer help and support. For example, if you want to quit smoking, they can give you advice and might prescribe medicines. If you want to improve your diet or get more physical activity, they can help you with this, too.   Lab tests, if needed - The tests you get will depend on your age and situation. For example, your doctor might want to check your:   Cholesterol   Blood sugar   Iron level   Vaccines - The recommended vaccines will depend on your age, health, and what vaccines you already had. Vaccines are very important because they can prevent certain serious or deadly infections.   Discussion of screening - \"Screening\" means checking for diseases or other health problems before they cause symptoms. Your doctor can recommend screening based on your age, risk, and preferences. This might include tests to check for:   Cancer, such as breast, prostate, cervical, ovarian, colorectal, prostate, lung, or skin cancer   Sexually transmitted infections, such as chlamydia and gonorrhea   Mental health conditions like depression and anxiety  Your doctor will talk to you about the different types of screening tests. They can help you decide which screenings to have. They can also explain what the results might mean.   Answering questions - The physical is a good time to ask the doctor or nurse questions about your health. If needed, they can refer you to other doctors or specialists, too.  Adults older than 65 years often need other care, too. As you get older, your doctor will talk to you about:   How to prevent falling at " home   Hearing or vision tests   Memory testing   How to take your medicines safely   Making sure that you have the help and support you need at home  All topics are updated as new evidence becomes available and our peer review process is complete.  This topic retrieved from [employer] on: May 02, 2024.  Topic 704292 Version 1.0  Release: 32.4.3 - C32.122  © 2024 UpToDate, Inc. and/or its affiliates. All rights reserved.  Consumer Information Use and Disclaimer   Disclaimer: This generalized information is a limited summary of diagnosis, treatment, and/or medication information. It is not meant to be comprehensive and should be used as a tool to help the user understand and/or assess potential diagnostic and treatment options. It does NOT include all information about conditions, treatments, medications, side effects, or risks that may apply to a specific patient. It is not intended to be medical advice or a substitute for the medical advice, diagnosis, or treatment of a health care provider based on the health care provider's examination and assessment of a patient's specific and unique circumstances. Patients must speak with a health care provider for complete information about their health, medical questions, and treatment options, including any risks or benefits regarding use of medications. This information does not endorse any treatments or medications as safe, effective, or approved for treating a specific patient. UpToDate, Inc. and its affiliates disclaim any warranty or liability relating to this information or the use thereof.The use of this information is governed by the Terms of Use, available at https://www.woltersV-Keyuwer.com/en/know/clinical-effectiveness-terms. 2024© UpToDate, Inc. and its affiliates and/or licensors. All rights reserved.  Copyright   © 2024 Up[employer]Date, Inc. and/or its affiliates. All rights reserved.

## 2025-04-29 NOTE — ASSESSMENT & PLAN NOTE
Lab Results   Component Value Date    HGBA1C 7.4 (H) 03/17/2025       Orders:  •  atorvastatin (LIPITOR) 20 mg tablet; Take 1 tablet (20 mg total) by mouth daily  •  metFORMIN (GLUCOPHAGE) 1000 MG tablet; Take 1 tablet (1,000 mg total) by mouth 2 (two) times a day with meals

## 2025-05-20 DIAGNOSIS — E11.65 TYPE 2 DIABETES MELLITUS WITH HYPERGLYCEMIA, WITHOUT LONG-TERM CURRENT USE OF INSULIN (HCC): ICD-10-CM

## 2025-05-21 RX ORDER — TIRZEPATIDE 5 MG/.5ML
INJECTION, SOLUTION SUBCUTANEOUS
Qty: 2 ML | Refills: 1 | Status: SHIPPED | OUTPATIENT
Start: 2025-05-21

## 2025-06-02 RX ORDER — FOLIC ACID 0.8 MG
400 TABLET ORAL DAILY
Qty: 90 TABLET | Refills: 1 | OUTPATIENT
Start: 2025-06-02

## 2025-06-11 ENCOUNTER — VBI (OUTPATIENT)
Dept: ADMINISTRATIVE | Facility: OTHER | Age: 60
End: 2025-06-11

## 2025-06-11 NOTE — TELEPHONE ENCOUNTER
06/11/25 8:44 AM     Chart reviewed for   [facility] Exam    ; nothing is submitted to the patient's insurance at this time.     YAYO BOWERS MA   [facility]

## 2025-07-11 DIAGNOSIS — E11.65 TYPE 2 DIABETES MELLITUS WITH HYPERGLYCEMIA, WITHOUT LONG-TERM CURRENT USE OF INSULIN (HCC): ICD-10-CM

## 2025-07-11 RX ORDER — DAPAGLIFLOZIN 10 MG/1
10 TABLET, FILM COATED ORAL DAILY
Qty: 90 TABLET | Refills: 1 | Status: SHIPPED | OUTPATIENT
Start: 2025-07-11

## 2025-07-11 RX ORDER — TIRZEPATIDE 5 MG/.5ML
INJECTION, SOLUTION SUBCUTANEOUS
Qty: 2 ML | Refills: 1 | Status: SHIPPED | OUTPATIENT
Start: 2025-07-11

## 2025-07-30 ENCOUNTER — NURSE TRIAGE (OUTPATIENT)
Age: 60
End: 2025-07-30

## 2025-07-30 ENCOUNTER — APPOINTMENT (OUTPATIENT)
Dept: LAB | Facility: HOSPITAL | Age: 60
End: 2025-07-30

## 2025-07-30 DIAGNOSIS — R30.0 DYSURIA: Primary | ICD-10-CM

## 2025-07-30 DIAGNOSIS — R30.0 DYSURIA: ICD-10-CM

## 2025-07-30 LAB
BILIRUB UR QL STRIP: NEGATIVE
CLARITY UR: CLEAR
COLOR UR: COLORLESS
GLUCOSE UR STRIP-MCNC: ABNORMAL MG/DL
HGB UR QL STRIP.AUTO: NEGATIVE
KETONES UR STRIP-MCNC: NEGATIVE MG/DL
LEUKOCYTE ESTERASE UR QL STRIP: NEGATIVE
NITRITE UR QL STRIP: NEGATIVE
PH UR STRIP.AUTO: 5 [PH]
PROT UR STRIP-MCNC: NEGATIVE MG/DL
SP GR UR STRIP.AUTO: <1.005 (ref 1–1.03)
UROBILINOGEN UR STRIP-ACNC: <2 MG/DL

## 2025-08-23 ENCOUNTER — OFFICE VISIT (OUTPATIENT)
Dept: URGENT CARE | Facility: MEDICAL CENTER | Age: 60
End: 2025-08-23
Payer: COMMERCIAL

## 2025-08-23 VITALS
SYSTOLIC BLOOD PRESSURE: 130 MMHG | BODY MASS INDEX: 29.82 KG/M2 | HEIGHT: 71 IN | TEMPERATURE: 98.6 F | OXYGEN SATURATION: 98 % | HEART RATE: 104 BPM | DIASTOLIC BLOOD PRESSURE: 70 MMHG | RESPIRATION RATE: 18 BRPM | WEIGHT: 213 LBS

## 2025-08-23 DIAGNOSIS — J01.00 ACUTE NON-RECURRENT MAXILLARY SINUSITIS: ICD-10-CM

## 2025-08-23 DIAGNOSIS — S00.03XA CONTUSION OF SCALP, INITIAL ENCOUNTER: Primary | ICD-10-CM

## 2025-08-23 PROCEDURE — S9083 URGENT CARE CENTER GLOBAL: HCPCS

## 2025-08-23 PROCEDURE — G0382 LEV 3 HOSP TYPE B ED VISIT: HCPCS

## 2025-08-23 RX ORDER — IBUPROFEN 800 MG/1
800 TABLET, FILM COATED ORAL EVERY 8 HOURS PRN
Qty: 30 TABLET | Refills: 0 | Status: SHIPPED | OUTPATIENT
Start: 2025-08-23

## 2025-08-23 RX ORDER — AZITHROMYCIN 250 MG/1
TABLET, FILM COATED ORAL
Qty: 6 TABLET | Refills: 0 | Status: SHIPPED | OUTPATIENT
Start: 2025-08-23 | End: 2025-08-27

## 2025-08-23 RX ORDER — BROMPHENIRAMINE MALEATE, PSEUDOEPHEDRINE HYDROCHLORIDE, AND DEXTROMETHORPHAN HYDROBROMIDE 2; 30; 10 MG/5ML; MG/5ML; MG/5ML
5 SYRUP ORAL 4 TIMES DAILY PRN
Qty: 120 ML | Refills: 0 | Status: SHIPPED | OUTPATIENT
Start: 2025-08-23